# Patient Record
Sex: MALE | Race: ASIAN | NOT HISPANIC OR LATINO | Employment: UNEMPLOYED | URBAN - METROPOLITAN AREA
[De-identification: names, ages, dates, MRNs, and addresses within clinical notes are randomized per-mention and may not be internally consistent; named-entity substitution may affect disease eponyms.]

---

## 2017-03-28 DIAGNOSIS — E78.2 MIXED HYPERLIPIDEMIA: ICD-10-CM

## 2017-03-28 RX ORDER — ATORVASTATIN CALCIUM 40 MG/1
TABLET, FILM COATED ORAL
Qty: 30 TABLET | Refills: 2 | Status: SHIPPED | OUTPATIENT
Start: 2017-03-28 | End: 2017-06-26 | Stop reason: SDUPTHER

## 2017-04-27 DIAGNOSIS — E11.9 DIABETES MELLITUS TYPE 2 WITHOUT RETINOPATHY: ICD-10-CM

## 2017-04-27 RX ORDER — GLIMEPIRIDE 4 MG/1
TABLET ORAL
Qty: 30 TABLET | Refills: 0 | Status: SHIPPED | OUTPATIENT
Start: 2017-04-27 | End: 2017-05-30 | Stop reason: SDUPTHER

## 2017-05-18 ENCOUNTER — OFFICE VISIT (OUTPATIENT)
Dept: INTERNAL MEDICINE | Facility: CLINIC | Age: 57
End: 2017-05-18
Payer: MEDICAID

## 2017-05-18 VITALS
DIASTOLIC BLOOD PRESSURE: 67 MMHG | HEART RATE: 71 BPM | WEIGHT: 158.06 LBS | TEMPERATURE: 96 F | BODY MASS INDEX: 24.76 KG/M2 | SYSTOLIC BLOOD PRESSURE: 118 MMHG | OXYGEN SATURATION: 97 %

## 2017-05-18 DIAGNOSIS — I10 HYPERTENSION, ESSENTIAL: ICD-10-CM

## 2017-05-18 DIAGNOSIS — M77.8 RIGHT ELBOW TENDONITIS: ICD-10-CM

## 2017-05-18 DIAGNOSIS — E11.9 DIABETES MELLITUS TYPE 2 WITHOUT RETINOPATHY: ICD-10-CM

## 2017-05-18 DIAGNOSIS — L98.9 SKIN LESION OF RIGHT LEG: Primary | ICD-10-CM

## 2017-05-18 DIAGNOSIS — E78.2 MIXED HYPERLIPIDEMIA: ICD-10-CM

## 2017-05-18 PROCEDURE — 99999 PR PBB SHADOW E&M-EST. PATIENT-LVL IV: CPT | Mod: PBBFAC,,, | Performed by: FAMILY MEDICINE

## 2017-05-18 PROCEDURE — 99214 OFFICE O/P EST MOD 30 MIN: CPT | Mod: S$PBB,,, | Performed by: FAMILY MEDICINE

## 2017-05-18 PROCEDURE — 99214 OFFICE O/P EST MOD 30 MIN: CPT | Mod: PBBFAC,PO | Performed by: FAMILY MEDICINE

## 2017-05-18 NOTE — MR AVS SNAPSHOT
[facility]  [address]  [place] [address]  Phone: [phone]  Fax: [phone]                  Robert Sinha   2017 2:40 PM   Office Visit    Description:  Male : 1960   Provider:  Raisa Snow MD   Department:  [facility]           Reason for Visit     sore on left leg     rt arm tenderness           Diagnoses this Visit        Comments    Right elbow tendonitis    -  Primary     Diabetes mellitus type 2 without retinopathy         Mixed hyperlipidemia         Hypertension, essential         Skin lesion of right leg                To Do List           Future Appointments        Provider Department Dept Phone    6/15/2017 7:30 AM Raisa Snow MD [facility] [phone]    10/16/2017 1:30 PM Sergio Devlin MD [facility] [phone]      Goals (5 Years of Data)     None      Follow-Up and Disposition     Return in about 4 weeks (around 6/15/2017).      [facility]     [facility] -  Assistance  Unless otherwise directed by your provider, please contact [facility], our nurse care line that is available for  assistance.     Registered nurses in the [facility] provide: appointment scheduling, clinical advisement, health education, and other advisory services.  Call: [phone] (toll free)               Medications           Message regarding Medications     Verify the changes and/or additions to your medication regime listed below are the same as discussed with your clinician today.  If any of these changes or additions are incorrect, please notify your healthcare provider.             Verify that the below list of medications is an accurate representation of the medications you are currently taking.  If none reported, the list may be blank. If incorrect, please contact your healthcare provider. Carry this list with you in case of emergency.           Current  Medications     atorvastatin (LIPITOR) 40 MG tablet TAKE 1 TABLET(40 MG) BY MOUTH EVERY DAY    glimepiride (AMARYL) 4 MG tablet TAKE 1 TABLET(4 MG) BY MOUTH EVERY DAY    hydrocortisone (WESTCORT) 0.2 % cream Apply topically 2 (two) times daily.    lisinopril-hydrochlorothiazide (PRINZIDE,ZESTORETIC) 10-12.5 mg per tablet Take 1 tablet by mouth once daily.    meloxicam (MOBIC) 7.5 MG tablet Take 7.5 mg by mouth once daily.    metformin (GLUCOPHAGE-XR) 750 MG 24 hr tablet TAKE 2 TABLETS BY MOUTH EVERY DAY    nystatin-triamcinolone (MYCOLOG II) cream ZACHARY TO RASH BID    sodium,potassium,mag sulfates (SUPREP BOWEL PREP KIT) 17.5-3.13-1.6 gram SolR Take 1 Units by mouth as directed.    SUPREP BOWEL PREP KIT 17.5-3.13-1.6 gram SolR Use as directed           Clinical Reference Information           Your Vitals Were     BP Pulse Temp    118/67 (BP Location: Right arm, Patient Position: Sitting, BP Method: Automatic) 71 96.2 °F (35.7 °C) (Tympanic)    Weight SpO2 BMI    71.7 kg (158 lb 1.1 oz) 97% 24.76 kg/m2      Blood Pressure          Most Recent Value    BP  118/67      Allergies as of 5/18/2017     Nsaids (Non-steroidal Anti-inflammatory Drug)      Immunizations Administered on Date of Encounter - 5/18/2017     None      Orders Placed During Today's Visit      Normal Orders This Visit    Ambulatory referral to Dermatology     Ambulatory Referral to Physical/Occupational Therapy     Future Labs/Procedures Expected by Expires    CBC auto differential  5/18/2017 7/17/2018    Comprehensive metabolic panel  5/18/2017 7/17/2018    Hemoglobin A1c  5/18/2017 7/17/2018    Lipid panel  5/18/2017 7/17/2018    Microalbumin/creatinine urine ratio  5/18/2017 7/17/2018      [facility] Sign-Up     Activating your [facility] account is as easy as 1-2-3!     1) Visit [email or web address], select Sign Up Now, enter this activation code and your date of birth, then select Next.  [email or web address]  Expires: 7/2/2017  4:07 PM      2) Create a  username and password to use when you visit [facility] in the future and select a security question in case you lose your password and select Next.    3) Enter your e-mail address and click Sign Up!    Additional Information  If you have questions, please e-mail [email or web address] or call [phone] to talk to our [employer] staff. Remember, [facility] is NOT to be used for urgent needs. For medical emergencies, dial 911.         Instructions      Tennis Elbow  Muscles connect to bones by thick, fibrous cords (tendons). When the muscles are overused by repeated motion, the tendons may become inflamed and painful. This condition is called tendonitis.  Tennis elbow (lateral epicondylitis) is a form of tendonitis. It occurs when the forearm muscles are used again and again in a twisting motion. Pain from tennis elbow occurs mainly on the outside of the elbow. But the pain can spread into the forearm and wrist. Your elbow may also be swollen and tender to the touch.  The pain may get worse when you move your arm or do simple activities. Bending your wrist back, shaking hands, or turning a doorknob may cause pain. The pain often gets worse after several weeks or months. Sometimes you may feel pain when your arm is still.  Tennis players who use a backhand stroke with poor technique are more likely to get tennis elbow. But playing tennis is only one cause of tennis elbow. Other common activities that can cause it include:  · Hammering  · Painting  · Raking  Besides tennis players, people at risk include , gardeners, musicians, and dentists. Sometimes people get tennis elbow without doing anything that would cause the injury.  Treatment includes resting the arm and taking anti-inflammatory medicines. Special splints help ease symptoms. Symptoms should get better after 4 to 6 weeks of rest. You may need steroid injections if resting and using a splint dont help. After the pain is relieved, you should change  your activities so the symptoms dont return. You may need physical therapy. It may include stretching, range-of-motion, and strengthening exercises. These treatments help most cases. You may need surgery if your symptoms continue for 6 months.  Home care  Follow these guidelines when caring for yourself at home:  · Rest your elbow as needed. Protect it from movement that causes pain. You may be told to use a forearm splint at night to ease symptoms in the morning. Your health care provider may recommend a special wrap or splint to compress the muscles of the forearm. This can ease pain during daytime activities. As your symptoms get better, start to move your elbow more.  · Put an ice pack on the injured area. Do this for 20 minutes every 1 to 2 hours the first day for pain relief. You can make an ice pack by wrapping a plastic bag of ice cubes in a thin towel. Continue using the ice pack 3 to 4 times a day for the next 2 days. Then use the ice pack as needed to ease pain and swelling.  · You may use acetaminophen or ibuprofen to control pain, unless another pain medicine was prescribed. If you have chronic liver or kidney disease, talk with your health care provider before using these medicines. Also talk with your provider if youve had a stomach ulcer or GI bleeding.  · After your elbow heals, avoid the motion that caused your pain. Or learn to move in a way that causes less stress on the tendon. Using a forearm wrap may keep tennis elbow from happening again.  · A tennis elbow strap may ease pain and keep you from further injury when you start playing tennis again. You can also lower your risk for injury by warming up before you play and cooling down afterward. You should also use the right equipment. For instance, make sure your racquet has the right  and is the right size for you.  Follow-up care  Follow up with your health care provider, or as advised, if your symptoms dont get better after 1 week of  treatment.  When to seek medical advice  Call your health care provider right away if any of these occur:  · Redness over the painful area  · Pain or swelling at the elbow gets worse  · Any numbness or tingling in your arm, hands, or fingers  · Unexplained fever over 101ºF (37.8ºC)   Date Last Reviewed: 2/17/2015  © 1233-8245 [employer]. [address] [place] [address], [place] [address]. All rights reserved. This information is not intended as a substitute for professional medical care. Always follow your healthcare professional's instructions.             Smoking Cessation     If you would like to quit smoking:   You may be eligible for free services if you are a [place] resident and started smoking cigarettes before September 1, 1988.  Call the Smoking Cessation Trust (SCT) toll free at (881) 695-8147 or (438) 134-7930.   Call 1-800-QUIT-NOW if you do not meet the above criteria.   Contact us via email: [email or web address]   View our website for more information: [email or web address]        Language Assistance Services     ATTENTION: Language assistance services are available, free of charge. Please call [phone].      ATENCIÓN: Si habla español, tiene a weiner disposición servicios gratuitos de asistencia lingüística. Llame al [phone].     CHÚ Ý: N?u b?n nói Ti?ng Vi?t, có các d?ch v? h? tr? ngôn ng? mi?n phí dành cho b?n. G?i s? [phone].         [facility] complies with applicable Federal civil rights laws and does not discriminate on the basis of race, color, national origin, age, disability, or sex.
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
## 2017-05-18 NOTE — PATIENT INSTRUCTIONS
Tennis Elbow  Muscles connect to bones by thick, fibrous cords (tendons). When the muscles are overused by repeated motion, the tendons may become inflamed and painful. This condition is called tendonitis.  Tennis elbow (lateral epicondylitis) is a form of tendonitis. It occurs when the forearm muscles are used again and again in a twisting motion. Pain from tennis elbow occurs mainly on the outside of the elbow. But the pain can spread into the forearm and wrist. Your elbow may also be swollen and tender to the touch.  The pain may get worse when you move your arm or do simple activities. Bending your wrist back, shaking hands, or turning a doorknob may cause pain. The pain often gets worse after several weeks or months. Sometimes you may feel pain when your arm is still.  Tennis players who use a backhand stroke with poor technique are more likely to get tennis elbow. But playing tennis is only one cause of tennis elbow. Other common activities that can cause it include:  · Hammering  · Painting  · Raking  Besides tennis players, people at risk include , gardeners, musicians, and dentists. Sometimes people get tennis elbow without doing anything that would cause the injury.  Treatment includes resting the arm and taking anti-inflammatory medicines. Special splints help ease symptoms. Symptoms should get better after 4 to 6 weeks of rest. You may need steroid injections if resting and using a splint dont help. After the pain is relieved, you should change your activities so the symptoms dont return. You may need physical therapy. It may include stretching, range-of-motion, and strengthening exercises. These treatments help most cases. You may need surgery if your symptoms continue for 6 months.  Home care  Follow these guidelines when caring for yourself at home:  · Rest your elbow as needed. Protect it from movement that causes pain. You may be told to use a forearm splint at night to ease symptoms  in the morning. Your health care provider may recommend a special wrap or splint to compress the muscles of the forearm. This can ease pain during daytime activities. As your symptoms get better, start to move your elbow more.  · Put an ice pack on the injured area. Do this for 20 minutes every 1 to 2 hours the first day for pain relief. You can make an ice pack by wrapping a plastic bag of ice cubes in a thin towel. Continue using the ice pack 3 to 4 times a day for the next 2 days. Then use the ice pack as needed to ease pain and swelling.  · You may use acetaminophen or ibuprofen to control pain, unless another pain medicine was prescribed. If you have chronic liver or kidney disease, talk with your health care provider before using these medicines. Also talk with your provider if youve had a stomach ulcer or GI bleeding.  · After your elbow heals, avoid the motion that caused your pain. Or learn to move in a way that causes less stress on the tendon. Using a forearm wrap may keep tennis elbow from happening again.  · A tennis elbow strap may ease pain and keep you from further injury when you start playing tennis again. You can also lower your risk for injury by warming up before you play and cooling down afterward. You should also use the right equipment. For instance, make sure your racquet has the right  and is the right size for you.  Follow-up care  Follow up with your health care provider, or as advised, if your symptoms dont get better after 1 week of treatment.  When to seek medical advice  Call your health care provider right away if any of these occur:  · Redness over the painful area  · Pain or swelling at the elbow gets worse  · Any numbness or tingling in your arm, hands, or fingers  · Unexplained fever over 101ºF (37.8ºC)   Date Last Reviewed: 2/17/2015  © 7733-6590 [employer]. [address] [place] [address], [place] [address]. All rights reserved. This information is not intended as a  substitute for professional medical care. Always follow your healthcare professional's instructions.

## 2017-05-22 ENCOUNTER — TELEPHONE (OUTPATIENT)
Dept: INTERNAL MEDICINE | Facility: CLINIC | Age: 57
End: 2017-05-22

## 2017-05-22 DIAGNOSIS — M77.8 RIGHT ELBOW TENDONITIS: Primary | ICD-10-CM

## 2017-05-22 NOTE — TELEPHONE ENCOUNTER
Please inform patient Dorinda HOOVER cannot see him due to insurance coverage.  I am sending a referral to our [facility] and they will contact the patient.

## 2017-05-22 NOTE — TELEPHONE ENCOUNTER
He was seen on 5/18/17 by Dr Snow  ( Here with growth to right lower leg. It is itchy - he states there was initially a purple discoloration at last visit but it has gotten larger)  Per the note.  He needs an appointment ASAP.  Please contact the patient for an appointment Or let me know if he can't be seen soon so I can schedule him to see surgery department

## 2017-05-30 DIAGNOSIS — E11.9 DIABETES MELLITUS TYPE 2 WITHOUT RETINOPATHY: ICD-10-CM

## 2017-05-30 RX ORDER — GLIMEPIRIDE 4 MG/1
TABLET ORAL
Qty: 30 TABLET | Refills: 0 | Status: SHIPPED | OUTPATIENT
Start: 2017-05-30 | End: 2017-09-01 | Stop reason: SDUPTHER

## 2017-06-07 DIAGNOSIS — E11.9 DIABETES MELLITUS TYPE 2, CONTROLLED: ICD-10-CM

## 2017-06-08 RX ORDER — METFORMIN HYDROCHLORIDE 750 MG/1
TABLET, EXTENDED RELEASE ORAL
Qty: 60 TABLET | Refills: 0 | Status: SHIPPED | OUTPATIENT
Start: 2017-06-08 | End: 2017-07-06 | Stop reason: SDUPTHER

## 2017-06-15 ENCOUNTER — OFFICE VISIT (OUTPATIENT)
Dept: INTERNAL MEDICINE | Facility: CLINIC | Age: 57
End: 2017-06-15
Payer: MEDICAID

## 2017-06-15 VITALS
SYSTOLIC BLOOD PRESSURE: 113 MMHG | DIASTOLIC BLOOD PRESSURE: 68 MMHG | HEART RATE: 69 BPM | OXYGEN SATURATION: 97 % | WEIGHT: 159.5 LBS | TEMPERATURE: 97 F | BODY MASS INDEX: 24.98 KG/M2

## 2017-06-15 DIAGNOSIS — E78.2 MIXED HYPERLIPIDEMIA: ICD-10-CM

## 2017-06-15 DIAGNOSIS — I10 HYPERTENSION, ESSENTIAL: ICD-10-CM

## 2017-06-15 DIAGNOSIS — E11.9 DIABETES MELLITUS TYPE 2 WITHOUT RETINOPATHY: ICD-10-CM

## 2017-06-15 DIAGNOSIS — M77.8 RIGHT ELBOW TENDONITIS: Primary | ICD-10-CM

## 2017-06-15 DIAGNOSIS — L30.9 DERMATITIS: ICD-10-CM

## 2017-06-15 LAB
ALBUMIN SERPL BCP-MCNC: 3.8 G/DL
ALP SERPL-CCNC: 59 U/L
ALT SERPL W/O P-5'-P-CCNC: 27 U/L
ANION GAP SERPL CALC-SCNC: 9 MMOL/L
AST SERPL-CCNC: 30 U/L
BASOPHILS # BLD AUTO: 0.04 K/UL
BASOPHILS NFR BLD: 0.5 %
BILIRUB SERPL-MCNC: 0.5 MG/DL
BUN SERPL-MCNC: 17 MG/DL
CALCIUM SERPL-MCNC: 9.4 MG/DL
CHLORIDE SERPL-SCNC: 105 MMOL/L
CHOLEST/HDLC SERPL: 5.1 {RATIO}
CO2 SERPL-SCNC: 22 MMOL/L
CREAT SERPL-MCNC: 0.9 MG/DL
CREAT UR-MCNC: 97 MG/DL
DIFFERENTIAL METHOD: ABNORMAL
EOSINOPHIL # BLD AUTO: 0.8 K/UL
EOSINOPHIL NFR BLD: 8.8 %
ERYTHROCYTE [DISTWIDTH] IN BLOOD BY AUTOMATED COUNT: 12.9 %
EST. GFR  (AFRICAN AMERICAN): >60 ML/MIN/1.73 M^2
EST. GFR  (NON AFRICAN AMERICAN): >60 ML/MIN/1.73 M^2
GLUCOSE SERPL-MCNC: 170 MG/DL
HCT VFR BLD AUTO: 44.2 %
HDL/CHOLESTEROL RATIO: 19.6 %
HDLC SERPL-MCNC: 189 MG/DL
HDLC SERPL-MCNC: 37 MG/DL
HGB BLD-MCNC: 15 G/DL
LDLC SERPL CALC-MCNC: 131.4 MG/DL
LYMPHOCYTES # BLD AUTO: 2.3 K/UL
LYMPHOCYTES NFR BLD: 26.6 %
MCH RBC QN AUTO: 31.6 PG
MCHC RBC AUTO-ENTMCNC: 33.9 %
MCV RBC AUTO: 93 FL
MICROALBUMIN UR DL<=1MG/L-MCNC: 9 UG/ML
MICROALBUMIN/CREATININE RATIO: 9.3 UG/MG
MONOCYTES # BLD AUTO: 0.6 K/UL
MONOCYTES NFR BLD: 6.7 %
NEUTROPHILS # BLD AUTO: 4.9 K/UL
NEUTROPHILS NFR BLD: 57.1 %
NONHDLC SERPL-MCNC: 152 MG/DL
PLATELET # BLD AUTO: 232 K/UL
PMV BLD AUTO: 9.9 FL
POTASSIUM SERPL-SCNC: 4.5 MMOL/L
PROT SERPL-MCNC: 7.4 G/DL
RBC # BLD AUTO: 4.75 M/UL
SODIUM SERPL-SCNC: 136 MMOL/L
TRIGL SERPL-MCNC: 103 MG/DL
WBC # BLD AUTO: 8.65 K/UL

## 2017-06-15 PROCEDURE — 85025 COMPLETE CBC W/AUTO DIFF WBC: CPT

## 2017-06-15 PROCEDURE — 83036 HEMOGLOBIN GLYCOSYLATED A1C: CPT

## 2017-06-15 PROCEDURE — 99999 PR PBB SHADOW E&M-EST. PATIENT-LVL III: CPT | Mod: PBBFAC,,, | Performed by: FAMILY MEDICINE

## 2017-06-15 PROCEDURE — 3044F HG A1C LEVEL LT 7.0%: CPT | Mod: ,,, | Performed by: FAMILY MEDICINE

## 2017-06-15 PROCEDURE — 99213 OFFICE O/P EST LOW 20 MIN: CPT | Mod: PBBFAC,PO | Performed by: FAMILY MEDICINE

## 2017-06-15 PROCEDURE — 99213 OFFICE O/P EST LOW 20 MIN: CPT | Mod: S$PBB,,, | Performed by: FAMILY MEDICINE

## 2017-06-15 PROCEDURE — 80053 COMPREHEN METABOLIC PANEL: CPT

## 2017-06-15 PROCEDURE — 82570 ASSAY OF URINE CREATININE: CPT

## 2017-06-15 PROCEDURE — 80061 LIPID PANEL: CPT

## 2017-06-15 RX ORDER — FLUTICASONE PROPIONATE 0.5 MG/G
CREAM TOPICAL 2 TIMES DAILY
Qty: 15 G | Refills: 0 | Status: SHIPPED | OUTPATIENT
Start: 2017-06-15 | End: 2019-02-26

## 2017-06-15 NOTE — PROGRESS NOTES
Subjective:       Patient ID: Robert Sinha is a 57 y.o. male.    Chief Complaint: Follow-up (right leg)    He is here for recheck - we were to review labs but they have not been obtained. He is using an elbow sleeve with straps and finds it helpful - he has not seen PT.  He has derm appt for 6 weeks from now - his leg lesion is TTP he states. No change.  Thinks creams have helped in the past and would like something for the itching.  He was helped with the hydrocortisone 0.2% in the past.        Review of Systems   Constitutional: Negative for unexpected weight change.   Cardiovascular: Negative for chest pain and leg swelling.   Skin: Positive for color change.       Objective:      Physical Exam   Constitutional: He is oriented to person, place, and time. He appears well-developed and well-nourished.   HENT:   Head: Normocephalic and atraumatic.   Neurological: He is alert and oriented to person, place, and time.   Skin: Skin is warm and dry.   2.5 x 1.6 elevated gray crust to right medial lower leg   Psychiatric: He has a normal mood and affect. His behavior is normal.         Assessment/Plan:     1. Right elbow tendonitis     2. Dermatitis  fluticasone (CUTIVATE) 0.05 % cream    he will use topical cream 2 weeks max then stop. sees derm 7/27/17.   3. Hypertension, essential  CBC auto differential    Comprehensive metabolic panel   4. Diabetes mellitus type 2 without retinopathy  Hemoglobin A1c    Microalbumin/creatinine urine ratio   5. Mixed hyperlipidemia  Lipid panel     Labs release today.  Will evaluate and follow-up as indicated.  He states he is not ready for refill yet on anything.  He is advised to use the cream for no more than 2 weeks from today.  He needs to be off it for several weeks before seeing dermatology.    He will continue to use his elbow splint.  He was contacted by PT and was told to call back for appointment.  Will do so if needed.

## 2017-06-16 LAB
ESTIMATED AVG GLUCOSE: 143 MG/DL
HBA1C MFR BLD HPLC: 6.6 %

## 2017-06-18 DIAGNOSIS — E78.2 MIXED HYPERLIPIDEMIA: ICD-10-CM

## 2017-06-18 DIAGNOSIS — E11.9 DIABETES MELLITUS TYPE 2 WITHOUT RETINOPATHY: Primary | ICD-10-CM

## 2017-06-26 DIAGNOSIS — E78.2 MIXED HYPERLIPIDEMIA: ICD-10-CM

## 2017-06-26 DIAGNOSIS — I10 HYPERTENSION, ESSENTIAL: ICD-10-CM

## 2017-06-26 RX ORDER — ATORVASTATIN CALCIUM 40 MG/1
TABLET, FILM COATED ORAL
Qty: 30 TABLET | Refills: 5 | Status: SHIPPED | OUTPATIENT
Start: 2017-06-26 | End: 2018-03-12 | Stop reason: SDUPTHER

## 2017-06-26 RX ORDER — LISINOPRIL AND HYDROCHLOROTHIAZIDE 10; 12.5 MG/1; MG/1
TABLET ORAL
Qty: 30 TABLET | Refills: 5 | Status: SHIPPED | OUTPATIENT
Start: 2017-06-26 | End: 2017-12-26 | Stop reason: SDUPTHER

## 2017-07-06 DIAGNOSIS — E11.9 DIABETES MELLITUS TYPE 2 WITHOUT RETINOPATHY: ICD-10-CM

## 2017-07-06 DIAGNOSIS — E11.9 DIABETES MELLITUS TYPE 2, CONTROLLED: ICD-10-CM

## 2017-07-06 RX ORDER — METFORMIN HYDROCHLORIDE 750 MG/1
TABLET, EXTENDED RELEASE ORAL
Qty: 60 TABLET | Refills: 0 | Status: SHIPPED | OUTPATIENT
Start: 2017-07-06 | End: 2017-07-28 | Stop reason: SDUPTHER

## 2017-07-06 RX ORDER — GLIMEPIRIDE 4 MG/1
TABLET ORAL
Qty: 30 TABLET | Refills: 0 | Status: SHIPPED | OUTPATIENT
Start: 2017-07-06 | End: 2017-07-25 | Stop reason: SDUPTHER

## 2017-07-25 DIAGNOSIS — E11.9 DIABETES MELLITUS TYPE 2 WITHOUT RETINOPATHY: ICD-10-CM

## 2017-07-25 RX ORDER — GLIMEPIRIDE 4 MG/1
TABLET ORAL
Qty: 30 TABLET | Refills: 0 | Status: SHIPPED | OUTPATIENT
Start: 2017-07-25 | End: 2017-09-01 | Stop reason: SDUPTHER

## 2017-07-28 DIAGNOSIS — E11.9 DIABETES MELLITUS TYPE 2, CONTROLLED: ICD-10-CM

## 2017-07-28 RX ORDER — METFORMIN HYDROCHLORIDE 750 MG/1
TABLET, EXTENDED RELEASE ORAL
Qty: 60 TABLET | Refills: 0 | Status: SHIPPED | OUTPATIENT
Start: 2017-07-28 | End: 2017-09-01 | Stop reason: SDUPTHER

## 2017-09-01 ENCOUNTER — TELEPHONE (OUTPATIENT)
Dept: INTERNAL MEDICINE | Facility: CLINIC | Age: 57
End: 2017-09-01

## 2017-09-01 DIAGNOSIS — E11.9 DIABETES MELLITUS TYPE 2 WITHOUT RETINOPATHY: ICD-10-CM

## 2017-09-01 RX ORDER — METFORMIN HYDROCHLORIDE 750 MG/1
1500 TABLET, EXTENDED RELEASE ORAL DAILY
Qty: 60 TABLET | Refills: 3 | Status: SHIPPED | OUTPATIENT
Start: 2017-09-01 | End: 2017-09-01

## 2017-09-01 RX ORDER — GLIMEPIRIDE 4 MG/1
TABLET ORAL
Qty: 30 TABLET | Refills: 3 | Status: SHIPPED | OUTPATIENT
Start: 2017-09-01 | End: 2017-12-26

## 2017-09-01 RX ORDER — GLIMEPIRIDE 4 MG/1
TABLET ORAL
Qty: 30 TABLET | Refills: 3 | Status: SHIPPED | OUTPATIENT
Start: 2017-09-01 | End: 2017-09-01 | Stop reason: SDUPTHER

## 2017-09-01 RX ORDER — METFORMIN HYDROCHLORIDE 500 MG/1
1500 TABLET, EXTENDED RELEASE ORAL DAILY
Qty: 90 TABLET | Refills: 3 | Status: SHIPPED | OUTPATIENT
Start: 2017-09-01 | End: 2017-12-26 | Stop reason: DRUGHIGH

## 2017-09-01 NOTE — TELEPHONE ENCOUNTER
Called and left a message for the patient to call the office back     All of his medications are on the 4 dollar plan through [facility] expect the Atorvastatin

## 2017-09-01 NOTE — TELEPHONE ENCOUNTER
----- Message from Jcarlos Patterson sent at 9/1/2017  2:32 PM CDT -----  Pt is returning a call to nurse to f/u on diabetes, high blood pressure, and cholesterol medications. Pt states he has no insurance and is unable to get his medication for the price asking. Pt is completely out of hid medication        Please call pt back at [phone]    [facility] [employer] [address] [place] [address] CHRISTEN COLON [place] [id number] [facility] [address] [address]  Phone: [phone] Fax: [phone]

## 2017-09-01 NOTE — TELEPHONE ENCOUNTER
Called pt and informed him that his medications can be filled through [employer] four dollar Rx plan. Pt verbalized understanding of the information given. Pt states that he will be using the [facility] at [facility] here in [place], [place] and would like for his Rx to be refilled there.    Please Advise.

## 2017-09-01 NOTE — TELEPHONE ENCOUNTER
Please ask him to check with his pharmacist regarding which medication was the most expensive.  I assume his atorvastatin was expensive.  Glimepiride, metformin, lisinopril Hydrochlorthiazide are usually in expensive.  They may also be cheaper at another pharmacy.  We show that he is on Medicaid which usually covers medications.  Verify that he is no longer on this plan.    Sending refills for his metformin and glimepiride.  He has refills at his pharmacy for his lisinoprilHCTZ and the atorvastatin.  I will be happy to work with him on cost, but most of his meds are not too expensive - he may have to change pharmacies.

## 2017-09-01 NOTE — TELEPHONE ENCOUNTER
"Returned the patient phone call. He states that he is out of his medications and no longer has any insurance. He states that he would like a "cheaper brand of medication called in"he is needing refills on his glimeperide, metformin, lisinopril-hydrochlorothiazide and atorvastatin). Pt states that the last time he tried to get his Rx refilled he was to pay almost two hundred dollars.      Please Advise  "

## 2017-09-01 NOTE — TELEPHONE ENCOUNTER
Glimepiride 4 mg one daily and metfromin  3 dialy sent to [facility] [place].   Atorvastatin not available for $4 - it will cost $30 for #30.  Only statin available for $4 is lovastatin. Pt informed to have [facility] transfer his atorvastatin and BP rxs from the [facility] to [facility].

## 2017-09-01 NOTE — TELEPHONE ENCOUNTER
----- Message from Jcarlos Patterson sent at 8/31/2017  4:39 PM CDT -----  Pt is requesting a call from nurse to discuss his medications and insurance. Pt states he is out of all his medications.          Please call pt back at [phone]

## 2017-10-03 DIAGNOSIS — E11.9 DIABETES MELLITUS TYPE 2, CONTROLLED: ICD-10-CM

## 2017-10-03 RX ORDER — METFORMIN HYDROCHLORIDE 750 MG/1
TABLET, EXTENDED RELEASE ORAL
Qty: 60 TABLET | Refills: 0 | Status: SHIPPED | OUTPATIENT
Start: 2017-10-03 | End: 2017-11-01 | Stop reason: SDUPTHER

## 2017-10-25 ENCOUNTER — TELEPHONE (OUTPATIENT)
Dept: INTERNAL MEDICINE | Facility: CLINIC | Age: 57
End: 2017-10-25

## 2017-10-25 NOTE — TELEPHONE ENCOUNTER
----- Message from Micki Lau sent at 10/25/2017 11:08 AM CDT -----  Contact: Patient   EP, patient would like to come in for pain in both right and left arms, Please call him at [phone] or [phone].    Thanks  td

## 2017-11-01 DIAGNOSIS — E11.9 DIABETES MELLITUS TYPE 2, CONTROLLED: ICD-10-CM

## 2017-11-01 RX ORDER — METFORMIN HYDROCHLORIDE 750 MG/1
TABLET, EXTENDED RELEASE ORAL
Qty: 60 TABLET | Refills: 0 | Status: SHIPPED | OUTPATIENT
Start: 2017-11-01 | End: 2017-12-04 | Stop reason: SDUPTHER

## 2017-12-04 ENCOUNTER — CLINICAL SUPPORT (OUTPATIENT)
Dept: INTERNAL MEDICINE | Facility: CLINIC | Age: 57
End: 2017-12-04
Payer: MEDICAID

## 2017-12-04 DIAGNOSIS — E78.2 MIXED HYPERLIPIDEMIA: ICD-10-CM

## 2017-12-04 DIAGNOSIS — E11.9 DIABETES MELLITUS TYPE 2 WITHOUT RETINOPATHY: ICD-10-CM

## 2017-12-04 DIAGNOSIS — E11.9 DIABETES MELLITUS TYPE 2, CONTROLLED: ICD-10-CM

## 2017-12-04 LAB
ANION GAP SERPL CALC-SCNC: 9 MMOL/L
BUN SERPL-MCNC: 19 MG/DL
CALCIUM SERPL-MCNC: 9.7 MG/DL
CHLORIDE SERPL-SCNC: 99 MMOL/L
CHOLEST SERPL-MCNC: 210 MG/DL
CHOLEST/HDLC SERPL: 5.7 {RATIO}
CO2 SERPL-SCNC: 26 MMOL/L
CREAT SERPL-MCNC: 1 MG/DL
EST. GFR  (AFRICAN AMERICAN): >60 ML/MIN/1.73 M^2
EST. GFR  (NON AFRICAN AMERICAN): >60 ML/MIN/1.73 M^2
ESTIMATED AVG GLUCOSE: 203 MG/DL
GLUCOSE SERPL-MCNC: 240 MG/DL
HBA1C MFR BLD HPLC: 8.7 %
HDLC SERPL-MCNC: 37 MG/DL
HDLC SERPL: 17.6 %
LDLC SERPL CALC-MCNC: 143.2 MG/DL
NONHDLC SERPL-MCNC: 173 MG/DL
POTASSIUM SERPL-SCNC: 4.6 MMOL/L
SODIUM SERPL-SCNC: 134 MMOL/L
TRIGL SERPL-MCNC: 149 MG/DL

## 2017-12-04 PROCEDURE — 83036 HEMOGLOBIN GLYCOSYLATED A1C: CPT

## 2017-12-04 PROCEDURE — 80061 LIPID PANEL: CPT

## 2017-12-04 PROCEDURE — 36415 COLL VENOUS BLD VENIPUNCTURE: CPT | Mod: PBBFAC,PO

## 2017-12-04 PROCEDURE — 80048 BASIC METABOLIC PNL TOTAL CA: CPT

## 2017-12-04 RX ORDER — METFORMIN HYDROCHLORIDE 750 MG/1
TABLET, EXTENDED RELEASE ORAL
Qty: 60 TABLET | Refills: 0 | Status: SHIPPED | OUTPATIENT
Start: 2017-12-04 | End: 2017-12-26 | Stop reason: SDUPTHER

## 2017-12-26 ENCOUNTER — OFFICE VISIT (OUTPATIENT)
Dept: INTERNAL MEDICINE | Facility: CLINIC | Age: 57
End: 2017-12-26
Payer: MEDICAID

## 2017-12-26 VITALS
TEMPERATURE: 97 F | DIASTOLIC BLOOD PRESSURE: 77 MMHG | BODY MASS INDEX: 25.38 KG/M2 | OXYGEN SATURATION: 95 % | SYSTOLIC BLOOD PRESSURE: 132 MMHG | WEIGHT: 162.06 LBS | HEART RATE: 83 BPM

## 2017-12-26 DIAGNOSIS — E11.9 DIABETES MELLITUS TYPE 2 WITHOUT RETINOPATHY: Primary | ICD-10-CM

## 2017-12-26 DIAGNOSIS — I10 HYPERTENSION, ESSENTIAL: ICD-10-CM

## 2017-12-26 DIAGNOSIS — M20.011 ACQUIRED MALLET DEFORMITY OF RIGHT MIDDLE FINGER: ICD-10-CM

## 2017-12-26 DIAGNOSIS — F17.200 CURRENT EVERY DAY SMOKER: ICD-10-CM

## 2017-12-26 DIAGNOSIS — E78.2 MIXED HYPERLIPIDEMIA: ICD-10-CM

## 2017-12-26 DIAGNOSIS — L30.9 DERMATITIS: ICD-10-CM

## 2017-12-26 PROCEDURE — 99999 PR PBB SHADOW E&M-EST. PATIENT-LVL III: CPT | Mod: PBBFAC,,, | Performed by: FAMILY MEDICINE

## 2017-12-26 PROCEDURE — 99214 OFFICE O/P EST MOD 30 MIN: CPT | Mod: S$PBB,,, | Performed by: FAMILY MEDICINE

## 2017-12-26 PROCEDURE — 99213 OFFICE O/P EST LOW 20 MIN: CPT | Mod: PBBFAC,PO | Performed by: FAMILY MEDICINE

## 2017-12-26 RX ORDER — LISINOPRIL AND HYDROCHLOROTHIAZIDE 10; 12.5 MG/1; MG/1
1 TABLET ORAL DAILY
Qty: 90 TABLET | Refills: 3 | Status: SHIPPED | OUTPATIENT
Start: 2017-12-26 | End: 2018-10-26 | Stop reason: DRUGHIGH

## 2017-12-26 RX ORDER — GLIMEPIRIDE 4 MG/1
TABLET ORAL
Qty: 90 TABLET | Refills: 1 | Status: SHIPPED | OUTPATIENT
Start: 2017-12-26 | End: 2018-06-22 | Stop reason: DRUGHIGH

## 2017-12-26 RX ORDER — METFORMIN HYDROCHLORIDE 750 MG/1
1500 TABLET, EXTENDED RELEASE ORAL DAILY
Qty: 180 TABLET | Refills: 1 | Status: SHIPPED | OUTPATIENT
Start: 2017-12-26 | End: 2018-06-22 | Stop reason: SDUPTHER

## 2017-12-26 NOTE — PROGRESS NOTES
Subjective:       Patient ID: Robert Sinha is a 57 y.o. male.    Chief Complaint: follow up on diabetes and lab test results    He is here for recheck DM 2, lipids, HTN with recent labs. He sts he has been using garlic - no atorvastatin - for a couple weeks.    Lab Results       Component                Value               Date                       WBC                      8.65                06/15/2017                 HGB                      15.0                06/15/2017                 HCT                      44.2                06/15/2017                 PLT                      232                 06/15/2017                 CHOL                     210 (H)             12/04/2017                 TRIG                     149                 12/04/2017                 HDL                      37 (L)              12/04/2017                 LDLCALC                  143.2               12/04/2017                 ALT                      27                  06/15/2017                 AST                      30                  06/15/2017                 NA                       134 (L)             12/04/2017                 K                        4.6                 12/04/2017                 CL                       99                  12/04/2017                 CALCIUM                  9.7                 12/04/2017                 CREATININE               1.0                 12/04/2017                 BUN                      19                  12/04/2017                 CO2                      26                  12/04/2017                 PSA                      0.32                05/16/2014                 GLU                      240 (H)             12/04/2017                 ESTGFRAFRICA             >60.0               12/04/2017                 EGFRNONAA                >60.0               12/04/2017                 HGBA1C                   8.7 (H)             12/04/2017                 MICALBCREAT               9.3                 06/15/2017            Glimepiride - out of this and could not get it filled - error with pharmacy. Will refill. He is also on 750mg metformin BID. No other DM meds.  HTN controlled. Note A1C 8.7, up from 6.6. On metformin alone.    He is also c/o contd problem with finger - injured it catching a ball couple months ago and he cannot extend the tip. No pain.      Diabetes   He presents for his follow-up diabetic visit. He has type 2 diabetes mellitus. His disease course has been worsening. There are no hypoglycemic associated symptoms. Associated symptoms include polyuria. Pertinent negatives for diabetes include no foot paresthesias and no foot ulcerations. There are no hypoglycemic complications. Pertinent negatives for diabetic complications include no heart disease, nephropathy, peripheral neuropathy or retinopathy. He participates in exercise intermittently. Home blood sugar record trend: no BS checks. An ACE inhibitor/angiotensin II receptor blocker is being taken. He does not see a podiatrist.Eye exam is not current.     Review of Systems   Endocrine: Positive for polyuria.   Genitourinary: Positive for frequency (thinks nocturia reduced by taking garlic last 3 weeks).   Skin: Positive for rash (improved with steroid cream - using off and on).       Objective:      Physical Exam   Constitutional: He is oriented to person, place, and time. He appears well-developed and well-nourished.   HENT:   Head: Normocephalic and atraumatic.   Right Ear: External ear normal.   Left Ear: External ear normal.   Mouth/Throat: Oropharynx is clear and moist. No oropharyngeal exudate.   Neck: Normal range of motion. Neck supple.   Cardiovascular: Normal rate, regular rhythm and normal heart sounds.    Pulses:       Dorsalis pedis pulses are 2+ on the right side, and 2+ on the left side.        Posterior tibial pulses are 1+ on the right side, and 1+ on the left side.   Pulmonary/Chest: Effort  normal and breath sounds normal.   Abdominal: Soft. Bowel sounds are normal. He exhibits no distension. There is no tenderness.   Musculoskeletal:        Right foot: There is normal range of motion and no deformity.        Left foot: There is normal range of motion and no deformity.   Middle right finger with DIP in flexion - unable to extend.    Feet:   Right Foot:   Protective Sensation: 10 sites tested. 10 sites sensed.   Skin Integrity: Negative for ulcer or skin breakdown.   Left Foot:   Protective Sensation: 10 sites tested. 10 sites sensed.   Skin Integrity: Negative for ulcer or skin breakdown.   Neurological: He is alert and oriented to person, place, and time.   Skin: Skin is warm and dry.   2.5 x 3 cm patch to medial right lower leg   Psychiatric: He has a normal mood and affect. His behavior is normal.         Assessment/Plan:     1. Diabetes mellitus type 2 without retinopathy  metFORMIN (GLUCOPHAGE-XR) 750 MG 24 hr tablet    glimepiride (AMARYL) 4 MG tablet    Hemoglobin A1c   2. Mixed hyperlipidemia  Lipid panel   3. Hypertension, essential  lisinopril-hydrochlorothiazide (PRINZIDE,ZESTORETIC) 10-12.5 mg per tablet   4. Dermatitis     5. 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 Pt advised and reviewed with him online that there is no evidence of garlic lowering lipids.  Agreed that he will not be started on any new statin rx till he rechecks in another 2 months. Check A1C then as well back on his glimepiride. Still on just 4 mg daily.

## 2017-12-27 ENCOUNTER — APPOINTMENT (OUTPATIENT)
Dept: RADIOLOGY | Facility: HOSPITAL | Age: 57
End: 2017-12-27
Attending: FAMILY MEDICINE
Payer: MEDICAID

## 2017-12-27 ENCOUNTER — TELEPHONE (OUTPATIENT)
Dept: INTERNAL MEDICINE | Facility: CLINIC | Age: 57
End: 2017-12-27

## 2017-12-27 DIAGNOSIS — S62.639A AVULSION FRACTURE OF DISTAL PHALANX OF FINGER, CLOSED, INITIAL ENCOUNTER: Primary | ICD-10-CM

## 2017-12-27 DIAGNOSIS — M20.011 ACQUIRED MALLET DEFORMITY OF RIGHT MIDDLE FINGER: ICD-10-CM

## 2017-12-27 PROCEDURE — 73140 X-RAY EXAM OF FINGER(S): CPT | Mod: 26,RT,, | Performed by: RADIOLOGY

## 2017-12-27 PROCEDURE — 73140 X-RAY EXAM OF FINGER(S): CPT | Mod: TC,PO

## 2017-12-28 NOTE — TELEPHONE ENCOUNTER
See his radiology report and results.  He does have an avulsion fracture to this digit.  Because it is a couple months old, it may not be correctable.  But I would like him to see ortho. Please schedule

## 2017-12-29 NOTE — TELEPHONE ENCOUNTER
Spoke to the patient  // notified of the results/ message // patient verbalized understanding      Will check with his insurance to see who is in network with his insurance

## 2018-01-08 DIAGNOSIS — E11.9 DIABETES MELLITUS TYPE 2, CONTROLLED: ICD-10-CM

## 2018-01-08 RX ORDER — METFORMIN HYDROCHLORIDE 750 MG/1
TABLET, EXTENDED RELEASE ORAL
Qty: 60 TABLET | Refills: 0 | OUTPATIENT
Start: 2018-01-08

## 2018-03-08 DIAGNOSIS — E78.2 MIXED HYPERLIPIDEMIA: ICD-10-CM

## 2018-03-09 RX ORDER — ATORVASTATIN CALCIUM 40 MG/1
TABLET, FILM COATED ORAL
Qty: 30 TABLET | Refills: 0 | OUTPATIENT
Start: 2018-03-09

## 2018-03-10 NOTE — TELEPHONE ENCOUNTER
Patient had lab orders scheduled for 2/26/18.  He did not get them.  A refill request was received for atorvastatin.  The agreement at his last visit was to get his lab results first before starting back on that, and I have refused the refill request    Please schedule him for his labs followed by a visit and we can review his current cholesterol through labs and visit.

## 2018-03-12 DIAGNOSIS — E78.2 MIXED HYPERLIPIDEMIA: ICD-10-CM

## 2018-03-12 RX ORDER — ATORVASTATIN CALCIUM 40 MG/1
TABLET, FILM COATED ORAL
Qty: 30 TABLET | Refills: 5 | Status: SHIPPED | OUTPATIENT
Start: 2018-03-12 | End: 2018-10-26 | Stop reason: ALTCHOICE

## 2018-03-12 NOTE — TELEPHONE ENCOUNTER
Pt stopped by the office on this afternoon to put a refill request in for his cholesterol medication. Pt Rx refill placed and sent to Dr. Snow.

## 2018-03-12 NOTE — TELEPHONE ENCOUNTER
Tried contacting the pt to inform of him needing to schedule an appointment. Unable to reach pt or leave a message.

## 2018-03-13 NOTE — TELEPHONE ENCOUNTER
He was supposed to get his lipids checked before starting back on statin - see 3/9/18 refill notes.  I have sent the atorvastatin refill to his pharmacy - but pls try one more time to have him come get his lab done before he starts med - thank you.

## 2018-06-06 ENCOUNTER — TELEPHONE (OUTPATIENT)
Dept: INTERNAL MEDICINE | Facility: CLINIC | Age: 58
End: 2018-06-06

## 2018-06-06 DIAGNOSIS — I10 HYPERTENSION, ESSENTIAL: ICD-10-CM

## 2018-06-06 DIAGNOSIS — E78.5 HYPERLIPIDEMIA ASSOCIATED WITH TYPE 2 DIABETES MELLITUS: ICD-10-CM

## 2018-06-06 DIAGNOSIS — E11.69 HYPERLIPIDEMIA ASSOCIATED WITH TYPE 2 DIABETES MELLITUS: ICD-10-CM

## 2018-06-06 DIAGNOSIS — E11.9 DIABETES MELLITUS TYPE 2 WITHOUT RETINOPATHY: Primary | ICD-10-CM

## 2018-06-06 NOTE — TELEPHONE ENCOUNTER
----- Message from Radha Duran sent at 6/6/2018  3:32 PM CDT -----  Contact: self/[phone]  Would like to consult with nurse regarding lab orders, please call back at [phone]. Thanks/ar

## 2018-06-06 NOTE — TELEPHONE ENCOUNTER
The patient has some labs orders in the system that he is coming to get Monday.  Please advise if you would like to add anything

## 2018-06-11 ENCOUNTER — CLINICAL SUPPORT (OUTPATIENT)
Dept: INTERNAL MEDICINE | Facility: CLINIC | Age: 58
End: 2018-06-11
Payer: MEDICAID

## 2018-06-11 DIAGNOSIS — E11.9 DIABETES MELLITUS TYPE 2 WITHOUT RETINOPATHY: ICD-10-CM

## 2018-06-11 DIAGNOSIS — I10 HYPERTENSION, ESSENTIAL: ICD-10-CM

## 2018-06-11 DIAGNOSIS — E78.2 MIXED HYPERLIPIDEMIA: ICD-10-CM

## 2018-06-11 LAB
ALBUMIN SERPL BCP-MCNC: 3.9 G/DL
ALP SERPL-CCNC: 47 U/L
ALT SERPL W/O P-5'-P-CCNC: 18 U/L
ANION GAP SERPL CALC-SCNC: 10 MMOL/L
AST SERPL-CCNC: 22 U/L
BASOPHILS # BLD AUTO: 0.04 K/UL
BASOPHILS NFR BLD: 0.5 %
BILIRUB SERPL-MCNC: 0.6 MG/DL
BUN SERPL-MCNC: 16 MG/DL
CALCIUM SERPL-MCNC: 9.5 MG/DL
CHLORIDE SERPL-SCNC: 104 MMOL/L
CHOLEST SERPL-MCNC: 258 MG/DL
CHOLEST/HDLC SERPL: 8.6 {RATIO}
CO2 SERPL-SCNC: 22 MMOL/L
CREAT SERPL-MCNC: 0.9 MG/DL
CREAT UR-MCNC: 173 MG/DL
DIFFERENTIAL METHOD: ABNORMAL
EOSINOPHIL # BLD AUTO: 0.4 K/UL
EOSINOPHIL NFR BLD: 4.6 %
ERYTHROCYTE [DISTWIDTH] IN BLOOD BY AUTOMATED COUNT: 12.2 %
EST. GFR  (AFRICAN AMERICAN): >60 ML/MIN/1.73 M^2
EST. GFR  (NON AFRICAN AMERICAN): >60 ML/MIN/1.73 M^2
ESTIMATED AVG GLUCOSE: 137 MG/DL
GLUCOSE SERPL-MCNC: 151 MG/DL
HBA1C MFR BLD HPLC: 6.4 %
HCT VFR BLD AUTO: 46.5 %
HDLC SERPL-MCNC: 30 MG/DL
HDLC SERPL: 11.6 %
HGB BLD-MCNC: 15.8 G/DL
IMM GRANULOCYTES # BLD AUTO: 0.04 K/UL
IMM GRANULOCYTES NFR BLD AUTO: 0.5 %
LDLC SERPL CALC-MCNC: 186.2 MG/DL
LYMPHOCYTES # BLD AUTO: 3 K/UL
LYMPHOCYTES NFR BLD: 40.4 %
MCH RBC QN AUTO: 31.7 PG
MCHC RBC AUTO-ENTMCNC: 34 G/DL
MCV RBC AUTO: 93 FL
MICROALBUMIN UR DL<=1MG/L-MCNC: 52 UG/ML
MICROALBUMIN/CREATININE RATIO: 30.1 UG/MG
MONOCYTES # BLD AUTO: 0.7 K/UL
MONOCYTES NFR BLD: 9.2 %
NEUTROPHILS # BLD AUTO: 3.4 K/UL
NEUTROPHILS NFR BLD: 44.8 %
NONHDLC SERPL-MCNC: 228 MG/DL
NRBC BLD-RTO: 0 /100 WBC
PLATELET # BLD AUTO: 253 K/UL
PMV BLD AUTO: 9.1 FL
POTASSIUM SERPL-SCNC: 4 MMOL/L
PROT SERPL-MCNC: 7.2 G/DL
RBC # BLD AUTO: 4.98 M/UL
SODIUM SERPL-SCNC: 136 MMOL/L
TRIGL SERPL-MCNC: 209 MG/DL
WBC # BLD AUTO: 7.53 K/UL

## 2018-06-11 PROCEDURE — 80061 LIPID PANEL: CPT

## 2018-06-11 PROCEDURE — 85025 COMPLETE CBC W/AUTO DIFF WBC: CPT

## 2018-06-11 PROCEDURE — 36415 COLL VENOUS BLD VENIPUNCTURE: CPT | Mod: PBBFAC,PO

## 2018-06-11 PROCEDURE — 80053 COMPREHEN METABOLIC PANEL: CPT

## 2018-06-11 PROCEDURE — 82043 UR ALBUMIN QUANTITATIVE: CPT

## 2018-06-11 PROCEDURE — 99999 PR PBB SHADOW E&M-EST. PATIENT-LVL I: CPT | Mod: PBBFAC,,,

## 2018-06-11 PROCEDURE — 99211 OFF/OP EST MAY X REQ PHY/QHP: CPT | Mod: PBBFAC,PO

## 2018-06-11 PROCEDURE — 83036 HEMOGLOBIN GLYCOSYLATED A1C: CPT

## 2018-06-22 ENCOUNTER — TELEPHONE (OUTPATIENT)
Dept: ORTHOPEDICS | Facility: CLINIC | Age: 58
End: 2018-06-22

## 2018-06-22 ENCOUNTER — OFFICE VISIT (OUTPATIENT)
Dept: INTERNAL MEDICINE | Facility: CLINIC | Age: 58
End: 2018-06-22
Payer: MEDICAID

## 2018-06-22 VITALS
SYSTOLIC BLOOD PRESSURE: 136 MMHG | HEART RATE: 65 BPM | BODY MASS INDEX: 25.1 KG/M2 | WEIGHT: 159.94 LBS | HEIGHT: 67 IN | DIASTOLIC BLOOD PRESSURE: 69 MMHG | OXYGEN SATURATION: 97 % | TEMPERATURE: 97 F

## 2018-06-22 DIAGNOSIS — E11.69 HYPERLIPIDEMIA ASSOCIATED WITH TYPE 2 DIABETES MELLITUS: ICD-10-CM

## 2018-06-22 DIAGNOSIS — S62.639K: ICD-10-CM

## 2018-06-22 DIAGNOSIS — E78.5 HYPERLIPIDEMIA ASSOCIATED WITH TYPE 2 DIABETES MELLITUS: ICD-10-CM

## 2018-06-22 DIAGNOSIS — E11.9 DIABETES MELLITUS TYPE 2 WITHOUT RETINOPATHY: Primary | ICD-10-CM

## 2018-06-22 DIAGNOSIS — I10 HYPERTENSION, ESSENTIAL: ICD-10-CM

## 2018-06-22 PROCEDURE — 99214 OFFICE O/P EST MOD 30 MIN: CPT | Mod: PBBFAC,PO | Performed by: FAMILY MEDICINE

## 2018-06-22 PROCEDURE — 99214 OFFICE O/P EST MOD 30 MIN: CPT | Mod: S$PBB,,, | Performed by: FAMILY MEDICINE

## 2018-06-22 PROCEDURE — 99999 PR PBB SHADOW E&M-EST. PATIENT-LVL IV: CPT | Mod: PBBFAC,,, | Performed by: FAMILY MEDICINE

## 2018-06-22 RX ORDER — METFORMIN HYDROCHLORIDE 750 MG/1
750 TABLET, EXTENDED RELEASE ORAL 2 TIMES DAILY
Qty: 180 TABLET | Refills: 1 | Status: SHIPPED | OUTPATIENT
Start: 2018-06-22 | End: 2018-12-07 | Stop reason: SDUPTHER

## 2018-06-22 RX ORDER — LANCING DEVICE
EACH MISCELLANEOUS
Qty: 1 EACH | Refills: 0 | Status: SHIPPED | OUTPATIENT
Start: 2018-06-22

## 2018-06-22 RX ORDER — INSULIN PUMP SYRINGE, 3 ML
EACH MISCELLANEOUS
Qty: 1 EACH | Refills: 0 | Status: SHIPPED | OUTPATIENT
Start: 2018-06-22 | End: 2021-07-22

## 2018-06-22 RX ORDER — LANCETS
1 EACH MISCELLANEOUS DAILY
Qty: 100 EACH | Refills: 4 | Status: SHIPPED | OUTPATIENT
Start: 2018-06-22

## 2018-06-22 RX ORDER — GLIMEPIRIDE 2 MG/1
2 TABLET ORAL
Qty: 90 TABLET | Refills: 1 | Status: SHIPPED | OUTPATIENT
Start: 2018-06-22 | End: 2018-12-03 | Stop reason: SDUPTHER

## 2018-06-22 NOTE — PROGRESS NOTES
Subjective:       Patient ID: Robert Sinha is a 58 y.o. male.    Chief Complaint: discuss labs; Diabetes; and Hypertension    Here for 6 month recheck on type 2 diabetes, hyperlipidemia, hypertension.  He has recent labs which are reviewed.  Lab Results       Component                Value               Date                       WBC                      7.53                06/11/2018                 HGB                      15.8                06/11/2018                 HCT                      46.5                06/11/2018                 PLT                      253                 06/11/2018                 CHOL                     258 (H)             06/11/2018                 TRIG                     209 (H)             06/11/2018                 HDL                      30 (L)              06/11/2018                 LDLCALC                  186.2 (H)           06/11/2018                 ALT                      18                  06/11/2018                 AST                      22                  06/11/2018                 NA                       136                 06/11/2018                 K                        4.0                 06/11/2018                 CL                       104                 06/11/2018                 CALCIUM                  9.5                 06/11/2018                 CREATININE               0.9                 06/11/2018                 BUN                      16                  06/11/2018                 CO2                      22 (L)              06/11/2018                 PSA                      0.32                05/16/2014                 GLU                      151 (H)             06/11/2018                 ESTGFRAFRICA             >60.0               06/11/2018                 EGFRNONAA                >60.0               06/11/2018                 HGBA1C                   6.4 (H)             06/11/2018                 MICALBCREAT              30.1  (H)            06/11/2018            He states he has been on no lipitor for 4-5 months and taking metformin 750mg only one daily.  He has been using several supplements including roasted dandelion root tea one daily.  Excellent improvement on his A1c down from 8.7 to 6.4.  He sees that the supplements have not been effective for controlling his cholesterol.  At his last visit he was using garlic and the results were better at that time.  His LDL-C went from 143 to 186 since that check.  HTN controlled, no med issues.    He has a mallet deformity to his R middle finger - dxd on xray at last visit with avulsion fx and referred to ortho.  He sts he was unable to get ortho appt - his insurance stated no orthopedists were available. Still with pain on trying to  heavier items to middle finger. Fracture occurred about 3 months prior to his last visit - so approx 9 mo ago.      Review of Systems   Gastrointestinal: Negative for diarrhea.   Musculoskeletal: Positive for arthralgias (tip of middle right digit - deformity for 9 monhts - painful with lifting items).       Objective:      Physical Exam   Constitutional: He is oriented to person, place, and time. He appears well-developed.   HENT:   Head: Normocephalic and atraumatic.   Cardiovascular: Normal rate, regular rhythm and normal heart sounds.    Pulmonary/Chest: Effort normal and breath sounds normal.   Musculoskeletal:   Right middle finger with flexion deformity to DIP   Neurological: He is alert and oriented to person, place, and time.   Skin: Skin is warm and dry.   Psychiatric: He has a normal mood and affect. His behavior is normal.         Assessment/Plan:     1. Diabetes mellitus type 2 without retinopathy  blood-glucose meter (BLOOD GLUCOSE MONITORING) kit    blood sugar diagnostic Strp    metFORMIN (GLUCOPHAGE-XR) 750 MG 24 hr tablet    Hemoglobin A1c    lancets Misc    lancing device Misc    glimepiride (AMARYL) 2 MG tablet   2. 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 Hyperlipidemia associated with type 2 diabetes mellitus  Lipid panel   4. Hypertension, essential  Comprehensive metabolic panel    will refer again to ortho.  He will get back on Lipitor 40.  He has refills available since he has not been on this medication over the last 6 months.  A1c shows excellent control on 4 mg glimepiride and metformin 750 mg daily.  He is advised to return to two metformin daily and instead to reduce his glimepiride from 4 mg to 2 mg daily.  No change to hypertension medication.  Recheck4 months.

## 2018-06-22 NOTE — PATIENT INSTRUCTIONS
Continue to take one metformin and the blood pressure pill in the morning.  Please restart atorvastatin 40mg (Lipitor). Take it in the evening with the second metformin.    Start taking 2mg glimepiride once daily between 5 to 7 PM. (Ideally take it up to 30 minutes before your meal.)

## 2018-06-22 NOTE — TELEPHONE ENCOUNTER
CALLED PT TO SCHEDULE APPOINTMENT RECEIVED IN WORKQUEUE. PT WAS UNAVAILABLE. LEFT MESSAGE FOR PT TO CALL BACK AT EARLIEST CONVENIENCE.

## 2018-06-27 ENCOUNTER — TELEPHONE (OUTPATIENT)
Dept: ORTHOPEDICS | Facility: CLINIC | Age: 58
End: 2018-06-27

## 2018-06-27 NOTE — TELEPHONE ENCOUNTER
CALLED PT TO SCHEDULE FROM REFERRAL IN WORKQUEUE. PT WAS UNAVAILABLE. UNABLE TO LEAVE MESSAGE. THIS IS THIRD ATTEMPT. PT REFERRAL REMOVED.

## 2018-10-22 ENCOUNTER — CLINICAL SUPPORT (OUTPATIENT)
Dept: INTERNAL MEDICINE | Facility: CLINIC | Age: 58
End: 2018-10-22
Payer: MEDICAID

## 2018-10-22 DIAGNOSIS — E11.9 DIABETES MELLITUS TYPE 2 WITHOUT RETINOPATHY: ICD-10-CM

## 2018-10-22 DIAGNOSIS — E78.5 HYPERLIPIDEMIA ASSOCIATED WITH TYPE 2 DIABETES MELLITUS: ICD-10-CM

## 2018-10-22 DIAGNOSIS — E11.69 HYPERLIPIDEMIA ASSOCIATED WITH TYPE 2 DIABETES MELLITUS: ICD-10-CM

## 2018-10-22 DIAGNOSIS — I10 HYPERTENSION, ESSENTIAL: ICD-10-CM

## 2018-10-22 LAB
ALBUMIN SERPL BCP-MCNC: 4 G/DL
ALP SERPL-CCNC: 56 U/L
ALT SERPL W/O P-5'-P-CCNC: 23 U/L
ANION GAP SERPL CALC-SCNC: 8 MMOL/L
AST SERPL-CCNC: 22 U/L
BILIRUB SERPL-MCNC: 0.6 MG/DL
BUN SERPL-MCNC: 18 MG/DL
CALCIUM SERPL-MCNC: 10 MG/DL
CHLORIDE SERPL-SCNC: 102 MMOL/L
CHOLEST SERPL-MCNC: 189 MG/DL
CHOLEST/HDLC SERPL: 4.7 {RATIO}
CO2 SERPL-SCNC: 24 MMOL/L
CREAT SERPL-MCNC: 0.9 MG/DL
EST. GFR  (AFRICAN AMERICAN): >60 ML/MIN/1.73 M^2
EST. GFR  (NON AFRICAN AMERICAN): >60 ML/MIN/1.73 M^2
ESTIMATED AVG GLUCOSE: 137 MG/DL
GLUCOSE SERPL-MCNC: 176 MG/DL
HBA1C MFR BLD HPLC: 6.4 %
HDLC SERPL-MCNC: 40 MG/DL
HDLC SERPL: 21.2 %
LDLC SERPL CALC-MCNC: 125 MG/DL
NONHDLC SERPL-MCNC: 149 MG/DL
POTASSIUM SERPL-SCNC: 4.2 MMOL/L
PROT SERPL-MCNC: 7.6 G/DL
SODIUM SERPL-SCNC: 134 MMOL/L
TRIGL SERPL-MCNC: 120 MG/DL

## 2018-10-22 PROCEDURE — 83036 HEMOGLOBIN GLYCOSYLATED A1C: CPT

## 2018-10-22 PROCEDURE — 99999 PR PBB SHADOW E&M-EST. PATIENT-LVL I: CPT | Mod: PBBFAC,,,

## 2018-10-22 PROCEDURE — 99211 OFF/OP EST MAY X REQ PHY/QHP: CPT | Mod: PBBFAC,PO

## 2018-10-22 PROCEDURE — 80053 COMPREHEN METABOLIC PANEL: CPT

## 2018-10-22 PROCEDURE — 80061 LIPID PANEL: CPT

## 2018-10-22 RX ORDER — LISINOPRIL AND HYDROCHLOROTHIAZIDE 10; 12.5 MG/1; MG/1
TABLET ORAL
Qty: 90 TABLET | Refills: 0 | OUTPATIENT
Start: 2018-10-22

## 2018-10-26 ENCOUNTER — OFFICE VISIT (OUTPATIENT)
Dept: INTERNAL MEDICINE | Facility: CLINIC | Age: 58
End: 2018-10-26
Payer: MEDICAID

## 2018-10-26 VITALS
HEART RATE: 71 BPM | HEIGHT: 67 IN | OXYGEN SATURATION: 96 % | TEMPERATURE: 97 F | WEIGHT: 161.94 LBS | DIASTOLIC BLOOD PRESSURE: 75 MMHG | BODY MASS INDEX: 25.42 KG/M2 | SYSTOLIC BLOOD PRESSURE: 137 MMHG

## 2018-10-26 DIAGNOSIS — E11.9 DIABETES MELLITUS TYPE 2 WITHOUT RETINOPATHY: Primary | ICD-10-CM

## 2018-10-26 DIAGNOSIS — Z63.9 FAMILY DISTRESS: ICD-10-CM

## 2018-10-26 DIAGNOSIS — F17.200 CURRENT SMOKER: ICD-10-CM

## 2018-10-26 DIAGNOSIS — K21.9 GASTROESOPHAGEAL REFLUX DISEASE WITHOUT ESOPHAGITIS: ICD-10-CM

## 2018-10-26 DIAGNOSIS — E11.69 ONYCHOMYCOSIS OF MULTIPLE TOENAILS WITH TYPE 2 DIABETES MELLITUS: ICD-10-CM

## 2018-10-26 DIAGNOSIS — R80.9 CONTROLLED TYPE 2 DIABETES MELLITUS WITH MICROALBUMINURIA, WITHOUT LONG-TERM CURRENT USE OF INSULIN: ICD-10-CM

## 2018-10-26 DIAGNOSIS — B35.1 ONYCHOMYCOSIS OF MULTIPLE TOENAILS WITH TYPE 2 DIABETES MELLITUS: ICD-10-CM

## 2018-10-26 DIAGNOSIS — E11.29 CONTROLLED TYPE 2 DIABETES MELLITUS WITH MICROALBUMINURIA, WITHOUT LONG-TERM CURRENT USE OF INSULIN: ICD-10-CM

## 2018-10-26 DIAGNOSIS — E78.2 MIXED HYPERLIPIDEMIA: ICD-10-CM

## 2018-10-26 DIAGNOSIS — I10 HYPERTENSION, ESSENTIAL: ICD-10-CM

## 2018-10-26 DIAGNOSIS — Z12.9 SCREENING FOR CANCER: ICD-10-CM

## 2018-10-26 PROCEDURE — 99215 OFFICE O/P EST HI 40 MIN: CPT | Mod: PBBFAC,PO | Performed by: FAMILY MEDICINE

## 2018-10-26 PROCEDURE — 99999 PR PBB SHADOW E&M-EST. PATIENT-LVL V: CPT | Mod: PBBFAC,,, | Performed by: FAMILY MEDICINE

## 2018-10-26 PROCEDURE — 99214 OFFICE O/P EST MOD 30 MIN: CPT | Mod: S$PBB,,, | Performed by: FAMILY MEDICINE

## 2018-10-26 RX ORDER — ROSUVASTATIN CALCIUM 40 MG/1
40 TABLET, COATED ORAL NIGHTLY
Qty: 90 TABLET | Refills: 3 | Status: SHIPPED | OUTPATIENT
Start: 2018-10-26 | End: 2018-12-20 | Stop reason: SDUPTHER

## 2018-10-26 RX ORDER — LISINOPRIL AND HYDROCHLOROTHIAZIDE 10; 12.5 MG/1; MG/1
1 TABLET ORAL DAILY
Qty: 90 TABLET | Refills: 3 | Status: CANCELLED | OUTPATIENT
Start: 2018-10-26

## 2018-10-26 RX ORDER — LISINOPRIL AND HYDROCHLOROTHIAZIDE 12.5; 2 MG/1; MG/1
1 TABLET ORAL DAILY
Qty: 90 TABLET | Refills: 3 | Status: SHIPPED | OUTPATIENT
Start: 2018-10-26 | End: 2019-11-07

## 2018-10-26 SDOH — SOCIAL DETERMINANTS OF HEALTH (SDOH): PROBLEM RELATED TO PRIMARY SUPPORT GROUP, UNSPECIFIED: Z63.9

## 2018-10-26 NOTE — PROGRESS NOTES
Subjective:       Patient ID: Robert Sinha is a 58 y.o. male.    Chief Complaint: Follow-up (discuss labs)    Here for scheduled DM, HTN recheck with recent labs.  Lab Results       Component                Value               Date                       WBC                      7.53                06/11/2018                 HGB                      15.8                06/11/2018                 HCT                      46.5                06/11/2018                 PLT                      253                 06/11/2018                 CHOL                     189                 10/22/2018                 TRIG                     120                 10/22/2018                 HDL                      40                  10/22/2018                 LDLCALC                  125.0               10/22/2018                 ALT                      23                  10/22/2018                 AST                      22                  10/22/2018                 NA                       134 (L)             10/22/2018                 K                        4.2                 10/22/2018                 CL                       102                 10/22/2018                 CALCIUM                  10.0                10/22/2018                 CREATININE               0.9                 10/22/2018                 BUN                      18                  10/22/2018                 CO2                      24                  10/22/2018                 PSA                      0.32                05/16/2014                 GLU                      176 (H)             10/22/2018                 ESTGFRAFRICA             >60.0               10/22/2018                 EGFRNONAA                >60.0               10/22/2018                 HGBA1C                   6.4 (H)             10/22/2018                 MICALBCREAT              30.1 (H)            06/11/2018            Microalbuminuria present. DM2  controlled.  Family stressors present, this is discussed, option offered.       Diabetes   He presents for his follow-up diabetic visit. He has type 2 diabetes mellitus. There are no hypoglycemic associated symptoms. Pertinent negatives for diabetes include no chest pain, no foot paresthesias and no visual change. There are no hypoglycemic complications. Symptoms are stable. There are no diabetic complications. Risk factors for coronary artery disease include dyslipidemia, hypertension, male sex and diabetes mellitus. Current diabetic treatment includes oral agent (dual therapy). His weight is stable. He is following a generally healthy diet. His breakfast blood glucose range is generally 130-140 mg/dl. An ACE inhibitor/angiotensin II receptor blocker is being taken. He does not see a podiatrist.Eye exam is not current.     Review of Systems   Respiratory: Negative for chest tightness and shortness of breath.    Cardiovascular: Negative for chest pain, palpitations and leg swelling.   Neurological: Negative for numbness.       Objective:      Physical Exam   Constitutional: He is oriented to person, place, and time. He appears well-developed and well-nourished.   HENT:   Head: Normocephalic and atraumatic.   Right Ear: External ear normal.   Left Ear: External ear normal.   Mouth/Throat: Oropharynx is clear and moist. No oropharyngeal exudate.   Neck: Normal range of motion. Neck supple.   Cardiovascular: Normal rate, regular rhythm and normal heart sounds.   Pulses:       Dorsalis pedis pulses are 2+ on the right side, and 2+ on the left side.        Posterior tibial pulses are 1+ on the right side, and 1+ on the left side.   Pulmonary/Chest: Effort normal and breath sounds normal.   Abdominal: Soft. Bowel sounds are normal. He exhibits no distension. There is no tenderness.   Musculoskeletal: He exhibits no edema.        Right foot: There is normal range of motion and no deformity.        Left foot: There is  normal range of motion and no deformity.   Feet:   Right Foot:   Protective Sensation: 10 sites tested. 10 sites sensed.   Skin Integrity: Negative for ulcer or skin breakdown.   Left Foot:   Protective Sensation: 10 sites tested. 10 sites sensed.   Skin Integrity: Negative for ulcer or skin breakdown.   Neurological: He is alert and oriented to person, place, and time.   Skin: Skin is warm and dry.   Thickened nails multiple toes B   Psychiatric: He has a normal mood and affect. His behavior is normal.         Assessment/Plan:     1. Diabetes mellitus type 2 without retinopathy  Ambulatory referral to Ophthalmology    Hemoglobin A1c   2. Hypertension, essential  Comprehensive metabolic panel    lisinopril-hydrochlorothiazide (PRINZIDE,ZESTORETIC) 20-12.5 mg per tablet   3. Mixed hyperlipidemia  rosuvastatin (CRESTOR) 40 MG Tab    Lipid panel   4. Gastroesophageal reflux disease without esophagitis  famotidine-calcium carbonate-magnesium hydroxide (PEPCID COMPLETE) chewable tablet   5. Screening for cancer  CT Chest Lung Screening Low Dose   6. Current smoker  Ambulatory referral to Smoking Cessation Program   7. Onychomycosis of multiple toenails with type 2 diabetes mellitus     8. Controlled type 2 diabetes mellitus with microalbuminuria, without long-term current use of insulin     9. Family distress  Ambulatory referral to Psychiatry    Six month recheck with labs.   Rosuvastatin 40 instead of atorvastatin 40 if affordable.   He is a candidate for a low-dose CT screening test for lung cancer as well as for the smoking cessation program.  He is open to referral to psych department for counseling - referral placed, tel number given for pt to call.

## 2018-10-29 ENCOUNTER — OFFICE VISIT (OUTPATIENT)
Dept: OPHTHALMOLOGY | Facility: CLINIC | Age: 58
End: 2018-10-29
Payer: MEDICAID

## 2018-10-29 DIAGNOSIS — H52.03 HYPEROPIA WITH PRESBYOPIA, BILATERAL: ICD-10-CM

## 2018-10-29 DIAGNOSIS — E11.9 TYPE 2 DIABETES MELLITUS WITHOUT RETINOPATHY: Primary | ICD-10-CM

## 2018-10-29 DIAGNOSIS — H52.4 HYPEROPIA WITH PRESBYOPIA, BILATERAL: ICD-10-CM

## 2018-10-29 PROCEDURE — 92014 COMPRE OPH EXAM EST PT 1/>: CPT | Mod: S$PBB,,, | Performed by: OPTOMETRIST

## 2018-10-29 PROCEDURE — 99999 PR PBB SHADOW E&M-EST. PATIENT-LVL I: CPT | Mod: PBBFAC,,, | Performed by: OPTOMETRIST

## 2018-10-29 PROCEDURE — 92015 DETERMINE REFRACTIVE STATE: CPT | Mod: ,,, | Performed by: OPTOMETRIST

## 2018-10-29 PROCEDURE — 99211 OFF/OP EST MAY X REQ PHY/QHP: CPT | Mod: PBBFAC,PO | Performed by: OPTOMETRIST

## 2018-10-29 NOTE — PROGRESS NOTES
HPI     PTs last exxam was 10/13/16 with [facility]. PT c/o blurred near va and wears   otc readers prn.   Diabetic eye exam  Diagnosed with diabetes in 1994  Recent vision fluctuations no  Blood sugar: 6.4  HPI    Any vision changes since last exam: no  Eye pain: no  Other ocular symptoms: no    Do you wear currently wear glasses or contacts? otc readers    Interested in contacts today? no    Do you plan on getting new glasses today? If needed          Last edited by Juana Herrmann MA on 10/29/2018  4:16 PM. (History)            Assessment /Plan     For exam results, see Encounter Report.    Type 2 diabetes mellitus without retinopathy  No diabetic retinopathy OD, OS  Continue close care with PCP  Monitor 12 months    Hyperopia with presbyopia, bilateral  Eyeglass Final Rx     Eyeglass Final Rx       Sphere Cylinder Axis Add    Right +1.25 +0.25 180 +2.00    Left +1.25 +0.75 180 +2.00    Expiration Date:  10/30/2019              RTC 1 yr for dilated eye exam or PRN if any problems.   Discussed above and answered questions.

## 2018-10-29 NOTE — LETTER
October 29, 2018      Raisa Snow MD  [address] [place] [address] [address] [place] [address]           [facility]  [address] [facility] [place] [address]  Phone: [phone]  Fax: [phone]          Patient: Robert Sinha   MR Number: [id number]   YOB: 1960   Date of Visit: 10/29/2018       Dear Dr. Raisa Snow:    Thank you for referring Robert Gonzáles to me for evaluation. Attached you will find relevant portions of my assessment and plan of care.    If you have questions, please do not hesitate to call me. I look forward to following Robert Gonzáles along with you.    Sincerely,    Casey Harris, OD    Enclosure  CC:  No Recipients    If you would like to receive this communication electronically, please contact [email or web address] or [phone] to request more information on [employer] access.    For providers and/or their staff who would like to refer a patient to [facility], please contact us through our one-stop-shop provider referral line, [facility], at [phone].    If you feel you have received this communication in error or would no longer like to receive these types of communications, please e-mail [email or web address]

## 2018-10-31 ENCOUNTER — HOSPITAL ENCOUNTER (OUTPATIENT)
Dept: RADIOLOGY | Facility: HOSPITAL | Age: 58
Discharge: HOME | End: 2018-10-31
Attending: FAMILY MEDICINE

## 2018-10-31 DIAGNOSIS — R80.9 CONTROLLED TYPE 2 DIABETES MELLITUS WITH MICROALBUMINURIA, WITHOUT LONG-TERM CURRENT USE OF INSULIN: ICD-10-CM

## 2018-10-31 DIAGNOSIS — Z12.9 SCREENING FOR CANCER: ICD-10-CM

## 2018-10-31 DIAGNOSIS — E11.29 CONTROLLED TYPE 2 DIABETES MELLITUS WITH MICROALBUMINURIA, WITHOUT LONG-TERM CURRENT USE OF INSULIN: ICD-10-CM

## 2018-10-31 DIAGNOSIS — I10 HYPERTENSION, ESSENTIAL: ICD-10-CM

## 2018-10-31 DIAGNOSIS — E78.2 MIXED HYPERLIPIDEMIA: ICD-10-CM

## 2018-10-31 DIAGNOSIS — R93.89 ABNORMAL SCREENING CT OF CHEST: Primary | ICD-10-CM

## 2018-10-31 PROCEDURE — G0297 LDCT FOR LUNG CA SCREEN: HCPCS | Mod: TC,PO

## 2018-12-03 ENCOUNTER — CLINICAL SUPPORT (OUTPATIENT)
Dept: CARDIOLOGY | Facility: CLINIC | Age: 58
End: 2018-12-03
Payer: MEDICAID

## 2018-12-03 ENCOUNTER — OFFICE VISIT (OUTPATIENT)
Dept: URGENT CARE | Facility: CLINIC | Age: 58
End: 2018-12-03
Payer: MEDICAID

## 2018-12-03 VITALS
HEIGHT: 67 IN | DIASTOLIC BLOOD PRESSURE: 64 MMHG | BODY MASS INDEX: 25.28 KG/M2 | HEART RATE: 67 BPM | OXYGEN SATURATION: 98 % | WEIGHT: 161.06 LBS | SYSTOLIC BLOOD PRESSURE: 118 MMHG

## 2018-12-03 DIAGNOSIS — R07.89 CHEST DISCOMFORT: ICD-10-CM

## 2018-12-03 DIAGNOSIS — E11.9 DIABETES MELLITUS TYPE 2 WITHOUT RETINOPATHY: ICD-10-CM

## 2018-12-03 DIAGNOSIS — K21.9 GASTROESOPHAGEAL REFLUX DISEASE WITHOUT ESOPHAGITIS: Primary | ICD-10-CM

## 2018-12-03 PROCEDURE — 93005 ELECTROCARDIOGRAM TRACING: CPT | Mod: PBBFAC,PO | Performed by: INTERNAL MEDICINE

## 2018-12-03 PROCEDURE — 99213 OFFICE O/P EST LOW 20 MIN: CPT | Mod: PBBFAC,25,PO | Performed by: NURSE PRACTITIONER

## 2018-12-03 PROCEDURE — 93010 ELECTROCARDIOGRAM REPORT: CPT | Mod: S$PBB,,, | Performed by: INTERNAL MEDICINE

## 2018-12-03 PROCEDURE — 99213 OFFICE O/P EST LOW 20 MIN: CPT | Mod: S$PBB,,, | Performed by: NURSE PRACTITIONER

## 2018-12-03 PROCEDURE — 99999 PR PBB SHADOW E&M-EST. PATIENT-LVL III: CPT | Mod: PBBFAC,,, | Performed by: NURSE PRACTITIONER

## 2018-12-03 RX ORDER — GLIMEPIRIDE 2 MG/1
TABLET ORAL
Qty: 90 TABLET | Refills: 1 | Status: SHIPPED | OUTPATIENT
Start: 2018-12-03 | End: 2019-07-08 | Stop reason: SDUPTHER

## 2018-12-03 RX ORDER — OMEPRAZOLE 40 MG/1
40 CAPSULE, DELAYED RELEASE ORAL DAILY
Qty: 30 CAPSULE | Refills: 11 | Status: SHIPPED | OUTPATIENT
Start: 2018-12-03 | End: 2020-10-02

## 2018-12-03 NOTE — PATIENT INSTRUCTIONS
B?nh GERD (Ng??i l?n)    Th?c qu?n là m?t ?ng d?n th?c ?n t? mi?ng vào rose t?. M?t van ? ??u d??i cùng c?a th?c qu?n ng?n cho ch?t axit t? rose t? kh?i trào ng??c lên phía trên. Khi van này không ho?t ??ng thích h?p, các ch?t greg rose t? có th? liên t?c trào ng??c lên (trào ng??c) vào th?c qu?n. ?i?u này ???c g?i là b?nh trào ng??c vào ???ng th?c qu?n d? dày (GERD). GERD có th? gây khó ch?u cho th?c qu?n. ?i?u này có th? gây ra các tr? ng?i v? vi?c nu?t ho?c hít th?. Greg các tr??ng h?p nghiêm tr?ng, GERD có th? gây ra ch?ng viêm ph?i tái di?n ho?c các v?n ?? nghiêm tr?ng khác.  Các tri?u ch?ng c?a s? trào ng??c rose g?m, bu?t rát b? áp l?c ho?c ?au nhói ? vùng b?ng phía trên ho?c t? gi?a t?i ph?n ng?c phía d??i. C?n ?au này có th? ada truy?n t?i c?, l?ng, ho?c vai. Có th? b? ?, ?em l?i m?t v? axit ? phía tabitha c? h?ng, ho mãn tính ho?c ?au c? ho?c kh?n gi?ng. Các tri?u ch?ng c?a b?nh GERD th??ng x?y ra vào ban ngày tabitha m?t b?a ?n th?t no. Chúng c?ng có th? x?y ra vào ban ?êm khi n?m larson?ng.   Ch?m sóc t?i bridget  Vi?c thay ??i l?i s?ng làm gi?m các tri?u ch?ng. N?u c?n, thu?c có th? ???c kê toa. Các tri?u ch?ng th??ng c?i thi?n tabitha khi ?i?u tr?, nh?ng n?u ng?ng ?i?u tr?, các tri?u ch?ng th??ng tr? l?i tabitha m?t vài tháng. Vì th? ?a s? nh?ng ng??i b? b?nh GERD s? c?n ti?p t?c ?i?u tr?.  Các thay ??i v? l?i s?ng  · Gi?i h?n tránh ?n các th?c ph?m m? màng, chiên, và th?c ph?m gualberto, c?ng nh? cà phê, sô cô la, b?c hà, và th?c ph?m có yris?u ch?t axit nh? cà judith và trái cây và n??c trái cây có ch?t judith (cam, b??i, lali).  · Không ?n no, ??c bi?t là vào ban ?êm. ?n các b?a ?n nh? h?n, th??ng xuyên là t?t nh?t. Không n?m larson?ng ngay tabitha khi ?n. Và không ?n b?t c? th? gì greg 3 gi? tr??c khi ?i ng?.  · Tránh u?ng r??u và hút thu?c lá. Tránh vi?c hút thu?c lá gián ti?p, càng yris?u càng t?t.  · N?u quý v? quá n?ng cân, vi?c s?t cân s? gi?m b?t các tri?u ch?ng.   · Tránh m?c qu?n áo ch?t xung quanh vùng b?leoncio c?a clifton v?.  · N?u các  tri?u ch?ng c?a quý v? x?y ra greg khi ng?, hãy dùng m?t mi?ng ??m ?? nâng nugent ph?n trên c? th? c?a quý v? (không ch? cho ??u c?a quý v? mà thôi.) Ho?c, ??t các kh?i d?y 4 in s? bên d??i ??u gi??ng.  Thu?c  N?u c?n, thu?c có th? giúp làm gi?m các tri?u ch?ng c?a b?nh GERD và ng?n ng?a thi?t h?i cho th?c qu?n. Bàn v? m?t k? ho?ch dùng thu?c v?i nhân viên y t? c?a quý v?. ?i?u này có th? rose g?m m?t ho?c yris?u lo?i thu?c tabitha ?ây:  · Antacids (ch?ng axit) ?? giúp yonis hoà ch?t axit thông th??ng greg rose t? c?a quý v?.  · Ch?t ng?n ch?n axit (ch?t ng?n ch?n H2) ?? làm gi?m vi?c t?o ra ch?t axit.  · Ch?t ?c ch? axit (PPIs) ?? gi?m vi?c t?o ra axit francisco m?t cách khác h?n là các ch?t ng?n ch?n. Chúng có th? có tác d?ng t?t h?n, nh?ng có th? c?n h?i lâu h?n m?t chút ?? có tác d?ng.  Dùng m?t viên antacid (ch?ng axit) t? 30-60 phút tabitha khi ?n và vào lúc ?i ng?, nh?ng không cùng lúc v?i vi?c dùng ch?t ng?n ch?n axit.   C? g?ng không dùng các lo?i thu?c nh? ibuprofen và aspirin. N?u quý v? hi?n ?ang dùng aspirin cho silvia c?a mình ho?c các lý do y jude khác, hãy bàn v?i nhân viên y t? c?a quý v? v? vi?c ng?ng thu?c này l?i.  Ch?m sóc francisco dõi  Hãy francisco dõi v?i nhân viên y t? c?a quý v? ho?c francisco s? khuyên nh? nhân viên chúng tôi.  Khi nào ?i tìm s? khuyên nh? v? y t?  G?i nhân viên y t? c?a mình n?u b? b?t c? nh?ng ?i?u nào tabitha ?ây:  · Ch?ng ?au b?ng b? tr?m tr?ng h?n và matilde?n larson?ng ph?n b?ng d??i phía bên ph?i (vùng ru?t th?a)  · ?au ng?c m?i larson?t hi?n ho?c b? tr?m tr?ng h?n, ho?c ada ra t?i l?ng, c?, vai, ho?c cánh otis  · Ói m?a th??ng xuyên (không th? gi? cho ch?t l?ng kh?i trào ra ???c)  · Có máu greg phân ho?c ch?t ói (có màu ?? ho?c ?en)  · C?m th?y y?u ho?c chóng m?t  · S?t t? 100.4°F (38°C) tr? lên, ho?c francisco ch? d?n c?a nhân viên y t?  Date Last Reviewed: 6/23/2015  © 8286-7204 [employer]. [address] [place] [address], [place] [address]. All rights reserved. This information is not intended as a substitute  for professional medical care. Always follow your healthcare professional's instructions.        Tips to Control Acid Reflux    To control acid reflux, youll need to make some basic diet and lifestyle changes. The simple steps outlined below may be all youll need to ease discomfort.  Watch what you eat  · Avoid fatty foods and spicy foods.  · Eat fewer acidic foods, such as citrus and tomato-based foods. These can increase symptoms.  · Limit drinking alcohol, caffeine, and fizzy beverages. All increase acid reflux.  · Try limiting chocolate, peppermint, and spearmint. These can worsen acid reflux in some people.  Watch when you eat  · Avoid lying down for 3 hours after eating.  · Do not snack before going to bed.  Raise your head  Raising your head and upper body by 4 to 6 inches helps limit reflux when youre lying down. Put blocks under the head of your bed frame to raise it.  Other changes  · Lose weight, if you need to  · Dont exercise near bedtime  · Avoid tight-fitting clothes  · Limit aspirin and ibuprofen  · Stop smoking   Date Last Reviewed: 7/1/2016  © 6822-7244 [employer]. [address] [place] [address], [place] [address]. All rights reserved. This information is not intended as a substitute for professional medical care. Always follow your healthcare professional's instructions.

## 2018-12-03 NOTE — PROGRESS NOTES
Subjective:       Patient ID: Robert Sinha is a 58 y.o. male.    Chief Complaint: No chief complaint on file.    58 year old male presents to Urgent Care with reports of burning in chest - heartburn that has been present on and off for about 3 weeks. Denies any other problems or conerns.      Heartburn   He complains of heartburn. He reports no abdominal pain, no chest pain, no nausea or no sore throat. This is a new problem. The current episode started today. The heartburn wakes him from sleep. The heartburn does not limit his activity. The heartburn doesn't change with position. The symptoms are aggravated by caffeine and certain foods.     Review of Systems   Constitutional: Negative for appetite change, chills and fever.   HENT: Negative for ear pain, sinus pressure, sore throat and trouble swallowing.    Eyes: Negative for visual disturbance.   Respiratory: Negative for shortness of breath.    Cardiovascular: Negative for chest pain.   Gastrointestinal: Positive for heartburn. Negative for abdominal pain, diarrhea, nausea and vomiting.        Heartburn   Endocrine: Negative for cold intolerance, polyphagia and polyuria.   Genitourinary: Negative for decreased urine volume and dysuria.   Musculoskeletal: Negative for back pain.   Skin: Negative for rash.   Allergic/Immunologic: Negative for environmental allergies and food allergies.   Neurological: Negative for dizziness, tremors, weakness and numbness.   Hematological: Does not bruise/bleed easily.   Psychiatric/Behavioral: Negative for confusion and hallucinations. The patient is not nervous/anxious and is not hyperactive.    All other systems reviewed and are negative.      Objective:     Physical Exam   Constitutional: He is oriented to person, place, and time. He appears well-developed and well-nourished.   HENT:   Head: Normocephalic.   Right Ear: External ear normal.   Left Ear: External ear normal.   Nose: Nose normal.   Mouth/Throat: Oropharynx is  clear and moist.   Eyes: Conjunctivae and EOM are normal. Pupils are equal, round, and reactive to light.   Neck: Normal range of motion. Neck supple. No JVD present.   Cardiovascular: Normal rate, regular rhythm, normal heart sounds and intact distal pulses.   Pulmonary/Chest: Effort normal and breath sounds normal. He has no wheezes.   Abdominal: Soft. Bowel sounds are normal. There is no tenderness.   Lymphadenopathy:     He has no cervical adenopathy.   Neurological: He is alert and oriented to person, place, and time.   Skin: Skin is warm and dry.   Psychiatric: He has a normal mood and affect. His behavior is normal. Judgment and thought content normal.   Nursing note and vitals reviewed.    Assessment:     1. Gastroesophageal reflux disease without esophagitis    2. Chest discomfort      Plan:   Diagnoses and all orders for this visit:    Gastroesophageal reflux disease without esophagitis    Chest discomfort  -     EKG 12-lead; Future    Other orders  -     omeprazole (PRILOSEC) 40 MG capsule; Take 1 capsule (40 mg total) by mouth once daily.

## 2018-12-04 ENCOUNTER — TELEPHONE (OUTPATIENT)
Dept: INTERNAL MEDICINE | Facility: CLINIC | Age: 58
End: 2018-12-04

## 2018-12-04 NOTE — TELEPHONE ENCOUNTER
Spoke to patient and advised that his glimepiride (AMARYL) 2 MG tablet was sent to the pharmacy on yesterday.  Patient verbally understood.

## 2018-12-04 NOTE — TELEPHONE ENCOUNTER
----- Message from Alma Delia Haynes sent at 12/4/2018 10:12 AM CST -----  Contact: pt  He's calling in regards to meds refill,pls call pt back at [phone] (home)

## 2018-12-04 NOTE — TELEPHONE ENCOUNTER
Please advise.     ----- Message from Tra Pineda sent at 12/4/2018  3:37 PM CST -----  Contact: Pt   States he's calling rg getting a call to schedule an appt for a lung screening and there are no orders in the system /pt can be reached at [phone]//thnks/dbw

## 2018-12-04 NOTE — TELEPHONE ENCOUNTER
Please help the patient schedule the cardiology appointment.  The screening test showed atherosclerosis to his aorta and his coronary arteries.  I referred him for cardiology appointment.  The referral is in the system.

## 2018-12-05 NOTE — TELEPHONE ENCOUNTER
Spoke with the patient and informed him of him needing to schedule his cardiology referral. Patient did schedule his referral and verbally understood the appointment information given.

## 2018-12-07 DIAGNOSIS — E11.9 DIABETES MELLITUS TYPE 2 WITHOUT RETINOPATHY: ICD-10-CM

## 2018-12-07 RX ORDER — METFORMIN HYDROCHLORIDE 750 MG/1
TABLET, EXTENDED RELEASE ORAL
Qty: 180 TABLET | Refills: 3 | Status: SHIPPED | OUTPATIENT
Start: 2018-12-07 | End: 2019-12-09

## 2018-12-07 RX ORDER — METFORMIN HYDROCHLORIDE 750 MG/1
750 TABLET, EXTENDED RELEASE ORAL 2 TIMES DAILY
Qty: 180 TABLET | Refills: 1 | OUTPATIENT
Start: 2018-12-07

## 2018-12-07 NOTE — TELEPHONE ENCOUNTER
----- Message from Dana Cabezas sent at 12/7/2018  4:34 PM CST -----  Contact: self [phone]  States that he is calling to check status on refill request for metformin. Pt uses     [facility] [address] [place], [place] [address] [place] [address]  Phone: [phone] Fax: [phone]    States that he is out of medication. Please call back at [phone]//thank you acc

## 2018-12-10 ENCOUNTER — TELEPHONE (OUTPATIENT)
Dept: INTERNAL MEDICINE | Facility: CLINIC | Age: 58
End: 2018-12-10

## 2018-12-10 DIAGNOSIS — E11.9 DIABETES MELLITUS TYPE 2 WITHOUT RETINOPATHY: ICD-10-CM

## 2018-12-10 NOTE — TELEPHONE ENCOUNTER
Informed pt that metformin RX has been sent to requested pharmacy on file. Pt verbalized understanding of information.

## 2018-12-10 NOTE — TELEPHONE ENCOUNTER
----- Message from Ana Moore sent at 12/10/2018  7:51 AM CST -----  Contact: pt  Pt is completely out and needs to today before trip.     1. What is the name of the medication you are requesting? Metformin  2. What is the dose? .  3. How do you take the medication? Orally, topically, etc? .  4. How often do you take this medication? .  5. Do you need a 30 day or 90 day supply? .  6. How many refills are you requesting? .  7. What is your preferred pharmacy and location of the pharmacy? [facility]  8. Who can we contact with further questions? [phone]

## 2018-12-19 ENCOUNTER — TELEPHONE (OUTPATIENT)
Dept: INTERNAL MEDICINE | Facility: CLINIC | Age: 58
End: 2018-12-19

## 2018-12-19 DIAGNOSIS — E78.2 MIXED HYPERLIPIDEMIA: ICD-10-CM

## 2018-12-19 RX ORDER — ATORVASTATIN CALCIUM 40 MG/1
TABLET, FILM COATED ORAL
Qty: 30 TABLET | Refills: 0 | OUTPATIENT
Start: 2018-12-19

## 2018-12-19 NOTE — TELEPHONE ENCOUNTER
----- Message from Diane Dobbins sent at 12/19/2018 10:07 AM CST -----  Contact: patient  1. What is the name of the medication you are requesting? Atorvastatin  2. What is the dose? 40mg  3. How do you take the medication? Orally, topically, etc? orally  4. How often do you take this medication? Once daily  5. Do you need a 30 day or 90 day supply? 90  6. How many refills are you requesting?   7. What is your preferred pharmacy and location of the pharmacy? [facility]  8. Who can we contact with further questions? Patient

## 2018-12-20 RX ORDER — ROSUVASTATIN CALCIUM 40 MG/1
40 TABLET, COATED ORAL NIGHTLY
Qty: 90 TABLET | Refills: 3 | Status: SHIPPED | OUTPATIENT
Start: 2018-12-20 | End: 2020-01-07 | Stop reason: SDUPTHER

## 2018-12-20 NOTE — TELEPHONE ENCOUNTER
Patient called in regards to him needing a refill on his cholesterol medication. Patient last seen on 10/26/18. Patient states that he is leaving to go out of town on tonight and will need this medication.

## 2018-12-20 NOTE — TELEPHONE ENCOUNTER
Patient informed of his medication being sent to the pharmacy and verbally understood the information given.

## 2019-01-14 ENCOUNTER — OFFICE VISIT (OUTPATIENT)
Dept: CARDIOLOGY | Facility: CLINIC | Age: 59
End: 2019-01-14
Payer: MEDICAID

## 2019-01-14 VITALS
WEIGHT: 162.25 LBS | BODY MASS INDEX: 25.47 KG/M2 | HEART RATE: 80 BPM | SYSTOLIC BLOOD PRESSURE: 122 MMHG | HEIGHT: 67 IN | DIASTOLIC BLOOD PRESSURE: 60 MMHG

## 2019-01-14 DIAGNOSIS — R07.89 OTHER CHEST PAIN: Primary | ICD-10-CM

## 2019-01-14 DIAGNOSIS — F17.200 SMOKER: ICD-10-CM

## 2019-01-14 DIAGNOSIS — Z82.49 FAMILY HISTORY OF HEART DISEASE IN MALE FAMILY MEMBER BEFORE AGE 55: ICD-10-CM

## 2019-01-14 DIAGNOSIS — I10 ESSENTIAL HYPERTENSION: Chronic | ICD-10-CM

## 2019-01-14 DIAGNOSIS — E78.5 HYPERLIPIDEMIA, UNSPECIFIED HYPERLIPIDEMIA TYPE: Chronic | ICD-10-CM

## 2019-01-14 DIAGNOSIS — E11.9 DIABETES MELLITUS TYPE 2 WITHOUT RETINOPATHY: ICD-10-CM

## 2019-01-14 PROCEDURE — 99204 OFFICE O/P NEW MOD 45 MIN: CPT | Mod: S$PBB,,, | Performed by: INTERNAL MEDICINE

## 2019-01-14 PROCEDURE — 99213 OFFICE O/P EST LOW 20 MIN: CPT | Mod: PBBFAC | Performed by: INTERNAL MEDICINE

## 2019-01-14 PROCEDURE — 99204 PR OFFICE/OUTPT VISIT, NEW, LEVL IV, 45-59 MIN: ICD-10-PCS | Mod: S$PBB,,, | Performed by: INTERNAL MEDICINE

## 2019-01-14 PROCEDURE — 99999 PR PBB SHADOW E&M-EST. PATIENT-LVL III: CPT | Mod: PBBFAC,,, | Performed by: INTERNAL MEDICINE

## 2019-01-14 PROCEDURE — 99999 PR PBB SHADOW E&M-EST. PATIENT-LVL III: ICD-10-PCS | Mod: PBBFAC,,, | Performed by: INTERNAL MEDICINE

## 2019-01-14 NOTE — LETTER
January 14, 2019      Raisa Snow MD  [address] [place] [address] [address]aswinder [place] [address]           [facility]  [address] [place] [address]  [place] [address]  Phone: [phone]  Fax: [phone]          Patient: Robert Sinha   MR Number: [id number]   YOB: 1960   Date of Visit: 1/14/2019       Dear Dr. Raisa Snow:    Thank you for referring Robert Gonzáles to me for evaluation. Attached you will find relevant portions of my assessment and plan of care.    If you have questions, please do not hesitate to call me. I look forward to following Robert Gonzáles along with you.    Sincerely,    Ren Falk MD    Enclosure  CC:  No Recipients    If you would like to receive this communication electronically, please contact [email or web address] or [phone] to request more information on [facility] [employer] access.    For providers and/or their staff who would like to refer a patient to [facility], please contact us through our one-stop-shop provider referral line, [facility] , at [phone].    If you feel you have received this communication in error or would no longer like to receive these types of communications, please e-mail [email or web address]

## 2019-01-14 NOTE — PROGRESS NOTES
Subjective:   Patient ID:  Robert Sinha is a 58 y.o. male who presents for evaluation of Establish Care; Heartburn; and Numbness (right arm)      57 yo male, referred for chest pain.  PMH DM, HTN, HLD. No h/o heart attack, stroke and cancer. F/H premature CAD.  Burning sensation for few months, could last over 2 days, sometime, resolved after TUMS and Zantac. Could be triggered by spicy food. Recently, some right arm numbness in the past 3 days. Most at night time.  Plays soccer and [place] to [place] [employer].  Smoking 1/2 ppd for 40 yrs. Drinking beers.  Mother DM and heart attack at age of 52.          Past Medical History:   Diagnosis Date    Diabetes mellitus, type 2     Hyperlipidemia     Hypertension        Past Surgical History:   Procedure Laterality Date    COLONOSCOPY N/A 12/13/2016    Performed by J[facility]ro [facility] at [facility]    COLONOSCOPY W/ POLYPECTOMY  12/13/2016    polyp x 1/ Dr Grande; repeat 5 yrs (adenomatous tissue)       Social History     Tobacco Use    Smoking status: Current Every Day Smoker     Packs/day: 0.50     Types: Cigarettes    Smokeless tobacco: Never Used   Substance Use Topics    Alcohol use: Yes     Alcohol/week: 1.5 oz     Types: 3 drink(s) per week    Drug use: No       Family History   Problem Relation Age of Onset    Diabetes Mother     Diabetes Father     Heart disease Sister     Heart disease Brother     Heart disease Sister     Strabismus Neg Hx     Retinal detachment Neg Hx     Macular degeneration Neg Hx     Glaucoma Neg Hx     Blindness Neg Hx     Amblyopia Neg Hx        Review of Systems   Constitution: Negative for decreased appetite, diaphoresis, fever, weakness, malaise/fatigue and night sweats.   HENT: Negative for nosebleeds.    Eyes: Negative for blurred vision and double vision.   Cardiovascular: Positive for chest pain. Negative for claudication, dyspnea on exertion, irregular heartbeat, leg swelling, near-syncope, orthopnea,  palpitations, paroxysmal nocturnal dyspnea and syncope.   Respiratory: Negative for cough, shortness of breath, sleep disturbances due to breathing, snoring, sputum production and wheezing.    Endocrine: Negative for cold intolerance and polyuria.   Hematologic/Lymphatic: Does not bruise/bleed easily.   Skin: Negative for rash.   Musculoskeletal: Negative for back pain, falls, joint pain, joint swelling and neck pain.   Gastrointestinal: Negative for abdominal pain, heartburn, nausea and vomiting.   Genitourinary: Negative for dysuria, frequency and hematuria.   Neurological: Negative for difficulty with concentration, dizziness, focal weakness, headaches, light-headedness, numbness and seizures.   Psychiatric/Behavioral: Negative for depression, memory loss and substance abuse. The patient does not have insomnia.    Allergic/Immunologic: Negative for HIV exposure and hives.       Objective:   Physical Exam   Constitutional: He is oriented to person, place, and time. He appears well-nourished.   HENT:   Head: Normocephalic.   Eyes: Pupils are equal, round, and reactive to light.   Neck: Normal carotid pulses and no JVD present. Carotid bruit is not present. No thyromegaly present.   Cardiovascular: Normal rate, regular rhythm, normal heart sounds and normal pulses.  No extrasystoles are present. PMI is not displaced. Exam reveals no gallop and no S3.   No murmur heard.  Pulmonary/Chest: Breath sounds normal. No stridor. No respiratory distress.   Abdominal: Soft. Bowel sounds are normal. There is no tenderness. There is no rebound.   Musculoskeletal: Normal range of motion.   Neurological: He is alert and oriented to person, place, and time.   Skin: Skin is intact. No rash noted.   Psychiatric: His behavior is normal.       Lab Results   Component Value Date    CHOL 189 10/22/2018    CHOL 258 (H) 06/11/2018    CHOL 210 (H) 12/04/2017     Lab Results   Component Value Date    HDL 40 10/22/2018    HDL 30 (L)  06/11/2018    HDL 37 (L) 12/04/2017     Lab Results   Component Value Date    LDLCALC 125.0 10/22/2018    LDLCALC 186.2 (H) 06/11/2018    LDLCALC 143.2 12/04/2017     Lab Results   Component Value Date    TRIG 120 10/22/2018    TRIG 209 (H) 06/11/2018    TRIG 149 12/04/2017     Lab Results   Component Value Date    CHOLHDL 21.2 10/22/2018    CHOLHDL 11.6 (L) 06/11/2018    CHOLHDL 17.6 (L) 12/04/2017       Chemistry        Component Value Date/Time     (L) 10/22/2018 0806    K 4.2 10/22/2018 0806     10/22/2018 0806    CO2 24 10/22/2018 0806    BUN 18 10/22/2018 0806    CREATININE 0.9 10/22/2018 0806     (H) 10/22/2018 0806        Component Value Date/Time    CALCIUM 10.0 10/22/2018 0806    ALKPHOS 56 10/22/2018 0806    AST 22 10/22/2018 0806    ALT 23 10/22/2018 0806    BILITOT 0.6 10/22/2018 0806    ESTGFRAFRICA >60.0 10/22/2018 0806    EGFRNONAA >60.0 10/22/2018 0806          Lab Results   Component Value Date    HGBA1C 6.4 (H) 10/22/2018     No results found for: TSH  No results found for: INR, PROTIME  Lab Results   Component Value Date    WBC 7.53 06/11/2018    HGB 15.8 06/11/2018    HCT 46.5 06/11/2018    MCV 93 06/11/2018     06/11/2018     BNP  @LABRCNTIP(BNP,BNPTRIAGEBLO)@  CrCl cannot be calculated (Patient's most recent lab result is older than the maximum 7 days allowed.).  No results found in the last 24 hours.  No results found in the last 24 hours.  No results found in the last 24 hours.    Assessment:      1. Other chest pain    2. Essential hypertension    3. Hyperlipidemia, unspecified hyperlipidemia type    4. Diabetes mellitus type 2 without retinopathy    5. Smoker    6. Family history of heart disease in male family member before age 55      Atypical CP  Multiple risk factors of CHD: HTN, HLD, Dm, F/h premature CAD and Smoking  LDL, BP and A1c wnl  Good exercise capacity    Plan:   Exercise ECHO  Continue prinzide and statin  Smoking cessation  Continue current  meds.  Recommend heart-healthy diet, weight control and regular exercise.  John. Risk modification.   RTC in 1 yr  I have reviewed all pertinent labs and cardiac studies. Plans and recommendations have been formulated under my direct supervision. All questions answered and patient voiced understanding. Patient to continue current medications.

## 2019-01-16 ENCOUNTER — TELEPHONE (OUTPATIENT)
Dept: CARDIOLOGY | Facility: CLINIC | Age: 59
End: 2019-01-16

## 2019-01-16 ENCOUNTER — CLINICAL SUPPORT (OUTPATIENT)
Dept: CARDIOLOGY | Facility: CLINIC | Age: 59
End: 2019-01-16
Attending: INTERNAL MEDICINE
Payer: MEDICAID

## 2019-01-16 DIAGNOSIS — R07.89 OTHER CHEST PAIN: ICD-10-CM

## 2019-01-16 LAB
DIASTOLIC DYSFUNCTION: NO
MITRAL VALVE MOBILITY: NORMAL
RETIRED EF AND QEF - SEE NOTES: 60 (ref 55–65)

## 2019-01-16 PROCEDURE — 93321 DOPPLER ECHO F-UP/LMTD STD: CPT | Mod: 26,S$PBB,, | Performed by: INTERNAL MEDICINE

## 2019-01-16 PROCEDURE — 93321 EXERCISE STRESS ECHO: ICD-10-PCS | Mod: 26,S$PBB,, | Performed by: INTERNAL MEDICINE

## 2019-01-16 PROCEDURE — 93351 STRESS TTE COMPLETE: CPT | Mod: PBBFAC | Performed by: INTERNAL MEDICINE

## 2019-01-16 PROCEDURE — 93351 EXERCISE STRESS ECHO: ICD-10-PCS | Mod: 26,S$PBB,, | Performed by: INTERNAL MEDICINE

## 2019-01-16 NOTE — TELEPHONE ENCOUNTER
Elmo Falk! I just wanted you to know that Mr. Gonzáles  presented today for Exercise stress echo. In recovery the Pt had EKG changes from NSR to RBBB during recovery. The Pt reported that he did not have any CP, VS stable.

## 2019-01-17 ENCOUNTER — TELEPHONE (OUTPATIENT)
Dept: CARDIOLOGY | Facility: CLINIC | Age: 59
End: 2019-01-17

## 2019-01-17 NOTE — TELEPHONE ENCOUNTER
Spoke with pt with echo results.  Pt verbalized understanding.      ----- Message from Ren Falk MD sent at 1/16/2019  9:39 PM CST -----  Stress echo showed mild LVH and no ischemia

## 2019-02-26 ENCOUNTER — OFFICE VISIT (OUTPATIENT)
Dept: INTERNAL MEDICINE | Facility: CLINIC | Age: 59
End: 2019-02-26
Payer: MEDICAID

## 2019-02-26 VITALS
DIASTOLIC BLOOD PRESSURE: 65 MMHG | TEMPERATURE: 97 F | BODY MASS INDEX: 25.81 KG/M2 | HEART RATE: 65 BPM | WEIGHT: 164.44 LBS | OXYGEN SATURATION: 97 % | SYSTOLIC BLOOD PRESSURE: 113 MMHG | HEIGHT: 67 IN

## 2019-02-26 DIAGNOSIS — E11.9 DIABETES MELLITUS TYPE 2 WITHOUT RETINOPATHY: Primary | ICD-10-CM

## 2019-02-26 DIAGNOSIS — E78.2 MIXED HYPERLIPIDEMIA: ICD-10-CM

## 2019-02-26 DIAGNOSIS — I10 HYPERTENSION, ESSENTIAL: ICD-10-CM

## 2019-02-26 LAB
ALBUMIN SERPL BCP-MCNC: 4 G/DL
ALBUMIN/CREAT UR: 22.9 UG/MG
ALP SERPL-CCNC: 54 U/L
ALT SERPL W/O P-5'-P-CCNC: 30 U/L
ANION GAP SERPL CALC-SCNC: 8 MMOL/L
AST SERPL-CCNC: 22 U/L
BILIRUB SERPL-MCNC: 0.3 MG/DL
BUN SERPL-MCNC: 22 MG/DL
CALCIUM SERPL-MCNC: 9.5 MG/DL
CHLORIDE SERPL-SCNC: 103 MMOL/L
CHOLEST SERPL-MCNC: 182 MG/DL
CHOLEST/HDLC SERPL: 5.9 {RATIO}
CO2 SERPL-SCNC: 24 MMOL/L
CREAT SERPL-MCNC: 1 MG/DL
CREAT UR-MCNC: 131 MG/DL
EST. GFR  (AFRICAN AMERICAN): >60 ML/MIN/1.73 M^2
EST. GFR  (NON AFRICAN AMERICAN): >60 ML/MIN/1.73 M^2
GLUCOSE SERPL-MCNC: 155 MG/DL
HDLC SERPL-MCNC: 31 MG/DL
HDLC SERPL: 17 %
LDLC SERPL CALC-MCNC: 105.4 MG/DL
MICROALBUMIN UR DL<=1MG/L-MCNC: 30 UG/ML
NONHDLC SERPL-MCNC: 151 MG/DL
POTASSIUM SERPL-SCNC: 4.3 MMOL/L
PROT SERPL-MCNC: 7.4 G/DL
SODIUM SERPL-SCNC: 135 MMOL/L
TRIGL SERPL-MCNC: 228 MG/DL

## 2019-02-26 PROCEDURE — 83036 HEMOGLOBIN GLYCOSYLATED A1C: CPT

## 2019-02-26 PROCEDURE — 80061 LIPID PANEL: CPT

## 2019-02-26 PROCEDURE — 80053 COMPREHEN METABOLIC PANEL: CPT

## 2019-02-26 PROCEDURE — 99213 OFFICE O/P EST LOW 20 MIN: CPT | Mod: PBBFAC,PO | Performed by: FAMILY MEDICINE

## 2019-02-26 PROCEDURE — 99999 PR PBB SHADOW E&M-EST. PATIENT-LVL III: CPT | Mod: PBBFAC,,, | Performed by: FAMILY MEDICINE

## 2019-02-26 PROCEDURE — 99999 PR PBB SHADOW E&M-EST. PATIENT-LVL III: ICD-10-PCS | Mod: PBBFAC,,, | Performed by: FAMILY MEDICINE

## 2019-02-26 PROCEDURE — 82043 UR ALBUMIN QUANTITATIVE: CPT

## 2019-02-26 PROCEDURE — 99214 PR OFFICE/OUTPT VISIT, EST, LEVL IV, 30-39 MIN: ICD-10-PCS | Mod: S$PBB,,, | Performed by: FAMILY MEDICINE

## 2019-02-26 PROCEDURE — 99214 OFFICE O/P EST MOD 30 MIN: CPT | Mod: S$PBB,,, | Performed by: FAMILY MEDICINE

## 2019-02-26 NOTE — PROGRESS NOTES
Subjective:       Patient ID: Robert Sinha is a 58 y.o. male.    Chief Complaint: 4 mth f/u (diabetes)    Patient with type 2 diabetes, hypertension, hyperlipidemia here for scheduled recheck with no recent labs. He has not obtained his ordered labs. Lab Results       Component                Value               Date                       WBC                      7.53                06/11/2018                 HGB                      15.8                06/11/2018                 HCT                      46.5                06/11/2018                 PLT                      253                 06/11/2018                 CHOL                     189                 10/22/2018                 TRIG                     120                 10/22/2018                 HDL                      40                  10/22/2018                 LDLCALC                  125.0               10/22/2018                 ALT                      23                  10/22/2018                 AST                      22                  10/22/2018                 NA                       134 (L)             10/22/2018                 K                        4.2                 10/22/2018                 CL                       102                 10/22/2018                 CALCIUM                  10.0                10/22/2018                 CREATININE               0.9                 10/22/2018                 BUN                      18                  10/22/2018                 CO2                      24                  10/22/2018                 PSA                      0.32                05/16/2014                 GLU                      176 (H)             10/22/2018                 ESTGFRAFRICA             >60.0               10/22/2018                 EGFRNONAA                >60.0               10/22/2018                 HGBA1C                   6.4 (H)             10/22/2018                 MICALBCREAT               30.1 (H)            06/11/2018              He sts he was having tingling to RUE for about 3 weeks - it resolved. He sts he pulled his right low back lifting a case of water this weekend. His nephew gave him some chiropractic stretches exercises to do and feeling better. It is improving - much better than it was no meds taken.  BMI 26 - first time he has been in overweight category - states ate late last night with friends.  Last food 10:30 PM sushi, jamie crab, noodles, beef  He had tingling to his RUE which resolved about 2 weeks ago - no neck pain  He had screening CT chest - no nodules but calcifications seen and had cardiology eval.     Diabetes   He presents for his follow-up diabetic visit. He has type 2 diabetes mellitus. There are no hypoglycemic associated symptoms. Pertinent negatives for diabetes include no chest pain, no foot paresthesias, no foot ulcerations, no polydipsia, no polyphagia and no visual change. There are no hypoglycemic complications. Symptoms are stable. Current diabetic treatment includes oral agent (dual therapy). Exercise: at work he is active. Home blood sugar record trend: does not check. An ACE inhibitor/angiotensin II receptor blocker is being taken. Eye exam is current.     Review of Systems   HENT: Negative for congestion and rhinorrhea.    Respiratory: Negative for chest tightness and shortness of breath.    Cardiovascular: Negative for chest pain and leg swelling.   Endocrine: Negative for polydipsia and polyphagia.   Musculoskeletal: Positive for back pain (current muscle strain improving) and myalgias. Negative for neck pain and neck stiffness.   Neurological: Negative for numbness.       Objective:      Physical Exam   Constitutional: He is oriented to person, place, and time. He appears well-developed.   HENT:   Head: Normocephalic and atraumatic.   Cardiovascular: Normal rate, regular rhythm and normal heart sounds.   Pulmonary/Chest: Effort normal and breath sounds  normal.   Musculoskeletal: He exhibits no edema.   Neurological: He is alert and oriented to person, place, and time.   Skin: Skin is warm and dry.   Psychiatric: He has a normal mood and affect. His behavior is normal.         Assessment/Plan:     1. Diabetes mellitus type 2 without retinopathy  Hemoglobin A1c    Microalbumin/creatinine urine ratio    Hemoglobin A1c   2. Hypertension, essential  Comprehensive metabolic panel    CBC auto differential    Basic metabolic panel   3. Mixed hyperlipidemia  Lipid panel     Labs today and again in 6 months - will recheck microalb today - 0.1 into microalbuminuria range.  OK to continue taking the glimepiride as he is if numbers stay controlled.

## 2019-02-27 LAB
ESTIMATED AVG GLUCOSE: 163 MG/DL
HBA1C MFR BLD HPLC: 7.3 %

## 2019-04-16 ENCOUNTER — OFFICE VISIT (OUTPATIENT)
Dept: INTERNAL MEDICINE | Facility: CLINIC | Age: 59
End: 2019-04-16
Payer: MEDICAID

## 2019-04-16 VITALS
BODY MASS INDEX: 26.01 KG/M2 | DIASTOLIC BLOOD PRESSURE: 78 MMHG | WEIGHT: 161.81 LBS | SYSTOLIC BLOOD PRESSURE: 137 MMHG | HEIGHT: 66 IN | OXYGEN SATURATION: 98 % | TEMPERATURE: 98 F | HEART RATE: 76 BPM

## 2019-04-16 DIAGNOSIS — E11.9 DIABETES MELLITUS TYPE 2 WITHOUT RETINOPATHY: Primary | ICD-10-CM

## 2019-04-16 DIAGNOSIS — R20.2 PARESTHESIA OF RIGHT ARM: ICD-10-CM

## 2019-04-16 DIAGNOSIS — I10 HYPERTENSION, ESSENTIAL: ICD-10-CM

## 2019-04-16 DIAGNOSIS — E78.2 MIXED HYPERLIPIDEMIA: ICD-10-CM

## 2019-04-16 DIAGNOSIS — K21.9 GASTROESOPHAGEAL REFLUX DISEASE WITHOUT ESOPHAGITIS: ICD-10-CM

## 2019-04-16 DIAGNOSIS — M25.512 LEFT SHOULDER PAIN, UNSPECIFIED CHRONICITY: ICD-10-CM

## 2019-04-16 PROCEDURE — 99214 PR OFFICE/OUTPT VISIT, EST, LEVL IV, 30-39 MIN: ICD-10-PCS | Mod: S$PBB,,, | Performed by: FAMILY MEDICINE

## 2019-04-16 PROCEDURE — 99999 PR PBB SHADOW E&M-EST. PATIENT-LVL III: ICD-10-PCS | Mod: PBBFAC,,, | Performed by: FAMILY MEDICINE

## 2019-04-16 PROCEDURE — 99214 OFFICE O/P EST MOD 30 MIN: CPT | Mod: S$PBB,,, | Performed by: FAMILY MEDICINE

## 2019-04-16 PROCEDURE — 99999 PR PBB SHADOW E&M-EST. PATIENT-LVL III: CPT | Mod: PBBFAC,,, | Performed by: FAMILY MEDICINE

## 2019-04-16 PROCEDURE — 99213 OFFICE O/P EST LOW 20 MIN: CPT | Mod: PBBFAC,PO | Performed by: FAMILY MEDICINE

## 2019-04-16 NOTE — PROGRESS NOTES
Subjective:       Patient ID: Robert Sinha is a 59 y.o. male.    Chief Complaint: f/u DM    Patient with type 2 diabetes, hypertension, hyperlipidemia here for to review his labs performed at last visit 6 weeks ago - A1C up to 7.3 from 6.4. Plan is to get back on good diet and recheck August.  Lab Results       Component                Value               Date                       WBC                      7.53                06/11/2018                 HGB                      15.8                06/11/2018                 HCT                      46.5                06/11/2018                 PLT                      253                 06/11/2018                 CHOL                     182                 02/26/2019                 TRIG                     228 (H)             02/26/2019                 HDL                      31 (L)              02/26/2019                 LDLCALC                  105.4               02/26/2019                 ALT                      30                  02/26/2019                 AST                      22                  02/26/2019                 NA                       135 (L)             02/26/2019                 K                        4.3                 02/26/2019                 CL                       103                 02/26/2019                 CALCIUM                  9.5                 02/26/2019                 CREATININE               1.0                 02/26/2019                 BUN                      22 (H)              02/26/2019                 CO2                      24                  02/26/2019                 PSA                      0.32                05/16/2014                 GLU                      155 (H)             02/26/2019                 ESTGFRAFRICA             >60.0               02/26/2019                 EGFRNONAA                >60.0               02/26/2019                 HGBA1C                   7.3 (H)              02/26/2019                 MICALBCREAT              22.9                02/26/2019     Microalbuminuria no longer present.    Smoker - 6 daily now.       Diabetes   He presents for his follow-up diabetic visit. He has type 2 diabetes mellitus. His disease course has been stable. There are no hypoglycemic associated symptoms. Pertinent negatives for diabetes include no foot paresthesias, no polyphagia and no polyuria. There are no hypoglycemic complications. Symptoms are stable. There are no diabetic complications. Current diabetic treatment includes oral agent (dual therapy). His weight is fluctuating minimally. He is following a generally healthy diet. (No BS checks) An ACE inhibitor/angiotensin II receptor blocker is being taken. Eye exam is current.     Review of Systems   Endocrine: Negative for polyphagia and polyuria.   Musculoskeletal: Positive for arthralgias (left shoulder off and on - can be very severe - can be weak).   Neurological: Positive for numbness (xavier MCKEON feels like ants crawling at night, no problem with feet.).       Objective:      Physical Exam   Constitutional: He is oriented to person, place, and time. He appears well-developed.   HENT:   Head: Normocephalic and atraumatic.   Neck:   FROM neck with pain to upper left shoulder region with neck flexion, L lateral rotation, L lat flexion  Neck NTTP, Traps NTTP.   Cardiovascular: Normal rate, regular rhythm and normal heart sounds.   Pulmonary/Chest: Effort normal and breath sounds normal.   Musculoskeletal:   B shoulders FROM wiwth tightness to post left shoulder at endpts ROM  B shoulders NTTP   Neurological: He is alert and oriented to person, place, and time.   Skin: Skin is warm and dry.   Psychiatric: He has a normal mood and affect. His behavior is normal.         Assessment/Plan:     1. Diabetes mellitus type 2 without retinopathy     2. Mixed hyperlipidemia     3. Hypertension, essential     4. Gastroesophageal reflux disease without  esophagitis     5. Left shoulder pain, unspecified chronicity     6. Paresthesia of right arm     get labs in 3 months - move glimepiride to AM, not PM. Keep met BID.  Advised to limit second helpings.  Posture info given re neck position - experiment to see if that influences the weak episodes of LUE. See if it infl;uences the ant crawling sensation only occurring in evening to RUE.  Also discussed PPI vs H2 blocker - he is only using the ppi once a week.

## 2019-04-16 NOTE — PATIENT INSTRUCTIONS
"Start taking your 2mg glimepiride with breakfast or up to 30 minutes before breakfast.    All other medications can be taken the same way you take now.    Limit "second helpings" of carbohydrates like rice, potato, bread, noodles.  "

## 2019-07-08 DIAGNOSIS — E11.9 DIABETES MELLITUS TYPE 2 WITHOUT RETINOPATHY: ICD-10-CM

## 2019-07-09 RX ORDER — GLIMEPIRIDE 2 MG/1
TABLET ORAL
Qty: 90 TABLET | Refills: 0 | Status: SHIPPED | OUTPATIENT
Start: 2019-07-09 | End: 2019-10-04

## 2019-07-11 ENCOUNTER — CLINICAL SUPPORT (OUTPATIENT)
Dept: INTERNAL MEDICINE | Facility: CLINIC | Age: 59
End: 2019-07-11
Payer: MEDICAID

## 2019-07-11 DIAGNOSIS — I10 HYPERTENSION, ESSENTIAL: ICD-10-CM

## 2019-07-11 DIAGNOSIS — E11.9 DIABETES MELLITUS TYPE 2 WITHOUT RETINOPATHY: ICD-10-CM

## 2019-07-11 PROCEDURE — 36415 COLL VENOUS BLD VENIPUNCTURE: CPT | Mod: PBBFAC,PO

## 2019-07-11 PROCEDURE — 83036 HEMOGLOBIN GLYCOSYLATED A1C: CPT

## 2019-07-11 PROCEDURE — 80048 BASIC METABOLIC PNL TOTAL CA: CPT

## 2019-07-11 PROCEDURE — 85025 COMPLETE CBC W/AUTO DIFF WBC: CPT

## 2019-07-12 LAB
ANION GAP SERPL CALC-SCNC: 11 MMOL/L (ref 8–16)
BASOPHILS # BLD AUTO: 0.04 K/UL (ref 0–0.2)
BASOPHILS NFR BLD: 0.5 % (ref 0–1.9)
BUN SERPL-MCNC: 16 MG/DL (ref 6–20)
CALCIUM SERPL-MCNC: 9.4 MG/DL (ref 8.7–10.5)
CHLORIDE SERPL-SCNC: 105 MMOL/L (ref 95–110)
CO2 SERPL-SCNC: 22 MMOL/L (ref 23–29)
CREAT SERPL-MCNC: 0.9 MG/DL (ref 0.5–1.4)
DIFFERENTIAL METHOD: ABNORMAL
EOSINOPHIL # BLD AUTO: 0.3 K/UL (ref 0–0.5)
EOSINOPHIL NFR BLD: 3.1 % (ref 0–8)
ERYTHROCYTE [DISTWIDTH] IN BLOOD BY AUTOMATED COUNT: 12.4 % (ref 11.5–14.5)
EST. GFR  (AFRICAN AMERICAN): >60 ML/MIN/1.73 M^2
EST. GFR  (NON AFRICAN AMERICAN): >60 ML/MIN/1.73 M^2
ESTIMATED AVG GLUCOSE: 151 MG/DL (ref 68–131)
GLUCOSE SERPL-MCNC: 117 MG/DL (ref 70–110)
HBA1C MFR BLD HPLC: 6.9 % (ref 4–5.6)
HCT VFR BLD AUTO: 43.2 % (ref 40–54)
HGB BLD-MCNC: 14.5 G/DL (ref 14–18)
IMM GRANULOCYTES # BLD AUTO: 0.04 K/UL (ref 0–0.04)
IMM GRANULOCYTES NFR BLD AUTO: 0.5 % (ref 0–0.5)
LYMPHOCYTES # BLD AUTO: 2.4 K/UL (ref 1–4.8)
LYMPHOCYTES NFR BLD: 27.5 % (ref 18–48)
MCH RBC QN AUTO: 31.7 PG (ref 27–31)
MCHC RBC AUTO-ENTMCNC: 33.6 G/DL (ref 32–36)
MCV RBC AUTO: 95 FL (ref 82–98)
MONOCYTES # BLD AUTO: 0.9 K/UL (ref 0.3–1)
MONOCYTES NFR BLD: 9.6 % (ref 4–15)
NEUTROPHILS # BLD AUTO: 5.2 K/UL (ref 1.8–7.7)
NEUTROPHILS NFR BLD: 58.8 % (ref 38–73)
NRBC BLD-RTO: 0 /100 WBC
PLATELET # BLD AUTO: 229 K/UL (ref 150–350)
PMV BLD AUTO: 9.2 FL (ref 9.2–12.9)
POTASSIUM SERPL-SCNC: 4.1 MMOL/L (ref 3.5–5.1)
RBC # BLD AUTO: 4.57 M/UL (ref 4.6–6.2)
SODIUM SERPL-SCNC: 138 MMOL/L (ref 136–145)
WBC # BLD AUTO: 8.81 K/UL (ref 3.9–12.7)

## 2019-07-16 ENCOUNTER — OFFICE VISIT (OUTPATIENT)
Dept: INTERNAL MEDICINE | Facility: CLINIC | Age: 59
End: 2019-07-16
Payer: MEDICAID

## 2019-07-16 VITALS
TEMPERATURE: 97 F | DIASTOLIC BLOOD PRESSURE: 67 MMHG | OXYGEN SATURATION: 98 % | SYSTOLIC BLOOD PRESSURE: 119 MMHG | HEIGHT: 66 IN | BODY MASS INDEX: 25.63 KG/M2 | HEART RATE: 57 BPM | WEIGHT: 159.5 LBS

## 2019-07-16 DIAGNOSIS — E11.69 HYPERLIPIDEMIA ASSOCIATED WITH TYPE 2 DIABETES MELLITUS: ICD-10-CM

## 2019-07-16 DIAGNOSIS — E11.9 DIABETES MELLITUS TYPE 2 WITHOUT RETINOPATHY: Primary | ICD-10-CM

## 2019-07-16 DIAGNOSIS — E78.5 HYPERLIPIDEMIA ASSOCIATED WITH TYPE 2 DIABETES MELLITUS: ICD-10-CM

## 2019-07-16 DIAGNOSIS — Z12.5 SCREENING FOR PROSTATE CANCER: ICD-10-CM

## 2019-07-16 DIAGNOSIS — I10 HYPERTENSION, ESSENTIAL: ICD-10-CM

## 2019-07-16 LAB — COMPLEXED PSA SERPL-MCNC: 0.25 NG/ML (ref 0–4)

## 2019-07-16 PROCEDURE — 99213 OFFICE O/P EST LOW 20 MIN: CPT | Mod: PBBFAC,PO | Performed by: FAMILY MEDICINE

## 2019-07-16 PROCEDURE — 99214 OFFICE O/P EST MOD 30 MIN: CPT | Mod: S$PBB,,, | Performed by: FAMILY MEDICINE

## 2019-07-16 PROCEDURE — 99999 PR PBB SHADOW E&M-EST. PATIENT-LVL III: ICD-10-PCS | Mod: PBBFAC,,, | Performed by: FAMILY MEDICINE

## 2019-07-16 PROCEDURE — 99999 PR PBB SHADOW E&M-EST. PATIENT-LVL III: CPT | Mod: PBBFAC,,, | Performed by: FAMILY MEDICINE

## 2019-07-16 PROCEDURE — 84153 ASSAY OF PSA TOTAL: CPT

## 2019-07-16 PROCEDURE — 99214 PR OFFICE/OUTPT VISIT, EST, LEVL IV, 30-39 MIN: ICD-10-PCS | Mod: S$PBB,,, | Performed by: FAMILY MEDICINE

## 2019-07-16 RX ORDER — CYCLOBENZAPRINE HCL 10 MG
TABLET ORAL
Refills: 0 | COMMUNITY
Start: 2019-06-26

## 2019-10-04 DIAGNOSIS — E11.9 DIABETES MELLITUS TYPE 2 WITHOUT RETINOPATHY: ICD-10-CM

## 2019-10-04 RX ORDER — GLIMEPIRIDE 2 MG/1
TABLET ORAL
Qty: 90 TABLET | Refills: 4 | Status: SHIPPED | OUTPATIENT
Start: 2019-10-04 | End: 2020-01-07 | Stop reason: SDUPTHER

## 2019-11-07 DIAGNOSIS — I10 HYPERTENSION, ESSENTIAL: ICD-10-CM

## 2019-11-07 RX ORDER — LISINOPRIL AND HYDROCHLOROTHIAZIDE 12.5; 2 MG/1; MG/1
1 TABLET ORAL DAILY
Qty: 90 TABLET | Refills: 0 | Status: SHIPPED | OUTPATIENT
Start: 2019-11-07 | End: 2020-01-07 | Stop reason: SDUPTHER

## 2019-11-07 RX ORDER — LISINOPRIL AND HYDROCHLOROTHIAZIDE 12.5; 2 MG/1; MG/1
1 TABLET ORAL DAILY
Qty: 90 TABLET | Refills: 3 | Status: CANCELLED | OUTPATIENT
Start: 2019-11-07 | End: 2020-11-06

## 2019-11-07 NOTE — TELEPHONE ENCOUNTER
----- Message from Ernestine Lau sent at 11/7/2019  7:04 AM CST -----  Contact: pt   Call pt in regards to rescheduling his nurse visit.    [phone] ([place])

## 2019-11-08 NOTE — TELEPHONE ENCOUNTER
Pt informed medication requested has been sent to pharmacy on file. Pt verbalized understanding of information.

## 2019-12-09 ENCOUNTER — CLINICAL SUPPORT (OUTPATIENT)
Dept: INTERNAL MEDICINE | Facility: CLINIC | Age: 59
End: 2019-12-09

## 2019-12-09 DIAGNOSIS — E11.69 HYPERLIPIDEMIA ASSOCIATED WITH TYPE 2 DIABETES MELLITUS: ICD-10-CM

## 2019-12-09 DIAGNOSIS — E11.9 DIABETES MELLITUS TYPE 2 WITHOUT RETINOPATHY: ICD-10-CM

## 2019-12-09 DIAGNOSIS — E78.5 HYPERLIPIDEMIA ASSOCIATED WITH TYPE 2 DIABETES MELLITUS: ICD-10-CM

## 2019-12-09 DIAGNOSIS — I10 HYPERTENSION, ESSENTIAL: ICD-10-CM

## 2019-12-09 RX ORDER — LISINOPRIL AND HYDROCHLOROTHIAZIDE 12.5; 2 MG/1; MG/1
1 TABLET ORAL DAILY
Qty: 90 TABLET | Refills: 0 | Status: CANCELLED | OUTPATIENT
Start: 2019-12-09 | End: 2020-12-08

## 2019-12-10 RX ORDER — METFORMIN HYDROCHLORIDE 750 MG/1
TABLET, EXTENDED RELEASE ORAL
Qty: 180 TABLET | Refills: 4 | Status: SHIPPED | OUTPATIENT
Start: 2019-12-10 | End: 2020-03-09 | Stop reason: SDUPTHER

## 2019-12-31 ENCOUNTER — PATIENT OUTREACH (OUTPATIENT)
Dept: ADMINISTRATIVE | Facility: HOSPITAL | Age: 59
End: 2019-12-31

## 2019-12-31 NOTE — LETTER
December 31, 2019        Robert Sinha  [address] [address]      Dear Mr. Gonzáles,    You have an upcoming appointment with Raisa Snow MD on 01/14/19.      Your chart is indicating you may be due for the following and I will be happy to assist you in scheduling any needed appointments:  Health Maintenance Due   Topic    Eye Exam     Urine Microalbumin           If you have had any of the above done at another facility, please bring the records or information with you so that your record at [facility] will be complete.    We will be happy to assist you with scheduling any necessary appointments or you may contact the [facility] appointment desk at [phone] to schedule at your convenience.     Thank you for choosing [facility] for your healthcare needs,      Sonia C., LPN Care Coordinator  [facility]  [phone]

## 2020-01-07 DIAGNOSIS — E11.9 DIABETES MELLITUS TYPE 2 WITHOUT RETINOPATHY: ICD-10-CM

## 2020-01-07 DIAGNOSIS — E78.2 MIXED HYPERLIPIDEMIA: ICD-10-CM

## 2020-01-07 DIAGNOSIS — I10 HYPERTENSION, ESSENTIAL: ICD-10-CM

## 2020-01-07 RX ORDER — GLIMEPIRIDE 2 MG/1
TABLET ORAL
Qty: 90 TABLET | Refills: 4 | Status: SHIPPED | OUTPATIENT
Start: 2020-01-07 | End: 2021-01-15 | Stop reason: SDUPTHER

## 2020-01-07 RX ORDER — ROSUVASTATIN CALCIUM 40 MG/1
40 TABLET, COATED ORAL NIGHTLY
Qty: 90 TABLET | Refills: 4 | Status: SHIPPED | OUTPATIENT
Start: 2020-01-07 | End: 2021-01-15 | Stop reason: SDUPTHER

## 2020-01-07 RX ORDER — LISINOPRIL AND HYDROCHLOROTHIAZIDE 12.5; 2 MG/1; MG/1
1 TABLET ORAL DAILY
Qty: 90 TABLET | Refills: 0 | Status: SHIPPED | OUTPATIENT
Start: 2020-01-07 | End: 2020-05-04 | Stop reason: SDUPTHER

## 2020-01-07 NOTE — TELEPHONE ENCOUNTER
----- Message from Ann Jones sent at 1/7/2020 12:51 PM CST -----  Contact: self  Type:  RX Refill Request    Who Called: pt  Refill or New Rx:refill  RX Name and Strength:cholesteral/fluid pill/diabetes  How is the patient currently taking it? (ex. 1XDay):n/a  Is this a 30 day or 90 day RX:n/a  Preferred Pharmacy with phone number:  [facility] #[id number] [place] [id number] [facility] & [address] [address]  Phone: [phone] Fax: [phone]  Local or Mail Order:local  Ordering Provider:rudi  Would the patient rather a call back or a response via [facility]? Call back  Best Call Back Number:[phone]  Additional Information: none    Thanks,  Ann Jones

## 2020-01-09 ENCOUNTER — CLINICAL SUPPORT (OUTPATIENT)
Dept: INTERNAL MEDICINE | Facility: CLINIC | Age: 60
End: 2020-01-09
Payer: COMMERCIAL

## 2020-01-09 LAB
ALBUMIN SERPL BCP-MCNC: 4 G/DL (ref 3.5–5.2)
ALP SERPL-CCNC: 48 U/L (ref 55–135)
ALT SERPL W/O P-5'-P-CCNC: 25 U/L (ref 10–44)
ANION GAP SERPL CALC-SCNC: 8 MMOL/L (ref 8–16)
AST SERPL-CCNC: 21 U/L (ref 10–40)
BILIRUB SERPL-MCNC: 0.4 MG/DL (ref 0.1–1)
BUN SERPL-MCNC: 21 MG/DL (ref 6–20)
CALCIUM SERPL-MCNC: 9.5 MG/DL (ref 8.7–10.5)
CHLORIDE SERPL-SCNC: 102 MMOL/L (ref 95–110)
CHOLEST SERPL-MCNC: 193 MG/DL (ref 120–199)
CHOLEST/HDLC SERPL: 4.7 {RATIO} (ref 2–5)
CO2 SERPL-SCNC: 24 MMOL/L (ref 23–29)
CREAT SERPL-MCNC: 1 MG/DL (ref 0.5–1.4)
EST. GFR  (AFRICAN AMERICAN): >60 ML/MIN/1.73 M^2
EST. GFR  (NON AFRICAN AMERICAN): >60 ML/MIN/1.73 M^2
GLUCOSE SERPL-MCNC: 185 MG/DL (ref 70–110)
HDLC SERPL-MCNC: 41 MG/DL (ref 40–75)
HDLC SERPL: 21.2 % (ref 20–50)
LDLC SERPL CALC-MCNC: 120.4 MG/DL (ref 63–159)
NONHDLC SERPL-MCNC: 152 MG/DL
POTASSIUM SERPL-SCNC: 4 MMOL/L (ref 3.5–5.1)
PROT SERPL-MCNC: 7.3 G/DL (ref 6–8.4)
SODIUM SERPL-SCNC: 134 MMOL/L (ref 136–145)
TRIGL SERPL-MCNC: 158 MG/DL (ref 30–150)

## 2020-01-09 PROCEDURE — 80053 COMPREHEN METABOLIC PANEL: CPT

## 2020-01-09 PROCEDURE — 80061 LIPID PANEL: CPT

## 2020-01-09 PROCEDURE — 83036 HEMOGLOBIN GLYCOSYLATED A1C: CPT

## 2020-01-09 NOTE — PROGRESS NOTES
Lab collected from previous lab visit, informed will call with results, Verbalized understanding.

## 2020-01-10 LAB
ESTIMATED AVG GLUCOSE: 169 MG/DL (ref 68–131)
HBA1C MFR BLD HPLC: 7.5 % (ref 4–5.6)

## 2020-01-14 ENCOUNTER — OFFICE VISIT (OUTPATIENT)
Dept: INTERNAL MEDICINE | Facility: CLINIC | Age: 60
End: 2020-01-14
Payer: COMMERCIAL

## 2020-01-14 VITALS
BODY MASS INDEX: 26.28 KG/M2 | TEMPERATURE: 98 F | DIASTOLIC BLOOD PRESSURE: 72 MMHG | SYSTOLIC BLOOD PRESSURE: 129 MMHG | OXYGEN SATURATION: 95 % | HEIGHT: 66 IN | WEIGHT: 163.5 LBS | HEART RATE: 72 BPM

## 2020-01-14 DIAGNOSIS — E78.5 HYPERLIPIDEMIA ASSOCIATED WITH TYPE 2 DIABETES MELLITUS: ICD-10-CM

## 2020-01-14 DIAGNOSIS — K21.9 GASTROESOPHAGEAL REFLUX DISEASE, ESOPHAGITIS PRESENCE NOT SPECIFIED: ICD-10-CM

## 2020-01-14 DIAGNOSIS — E11.9 DIABETES MELLITUS TYPE 2 WITHOUT RETINOPATHY: Primary | ICD-10-CM

## 2020-01-14 DIAGNOSIS — E11.69 HYPERLIPIDEMIA ASSOCIATED WITH TYPE 2 DIABETES MELLITUS: ICD-10-CM

## 2020-01-14 DIAGNOSIS — I10 HYPERTENSION, ESSENTIAL: ICD-10-CM

## 2020-01-14 PROCEDURE — 3008F BODY MASS INDEX DOCD: CPT | Mod: CPTII,S$GLB,, | Performed by: FAMILY MEDICINE

## 2020-01-14 PROCEDURE — 99214 OFFICE O/P EST MOD 30 MIN: CPT | Mod: S$GLB,,, | Performed by: FAMILY MEDICINE

## 2020-01-14 PROCEDURE — 99214 PR OFFICE/OUTPT VISIT, EST, LEVL IV, 30-39 MIN: ICD-10-PCS | Mod: S$GLB,,, | Performed by: FAMILY MEDICINE

## 2020-01-14 PROCEDURE — 82043 UR ALBUMIN QUANTITATIVE: CPT

## 2020-01-14 PROCEDURE — 99999 PR PBB SHADOW E&M-EST. PATIENT-LVL IV: CPT | Mod: PBBFAC,,, | Performed by: FAMILY MEDICINE

## 2020-01-14 PROCEDURE — 3074F PR MOST RECENT SYSTOLIC BLOOD PRESSURE < 130 MM HG: ICD-10-PCS | Mod: CPTII,S$GLB,, | Performed by: FAMILY MEDICINE

## 2020-01-14 PROCEDURE — 3078F DIAST BP <80 MM HG: CPT | Mod: CPTII,S$GLB,, | Performed by: FAMILY MEDICINE

## 2020-01-14 PROCEDURE — 3074F SYST BP LT 130 MM HG: CPT | Mod: CPTII,S$GLB,, | Performed by: FAMILY MEDICINE

## 2020-01-14 PROCEDURE — 3078F PR MOST RECENT DIASTOLIC BLOOD PRESSURE < 80 MM HG: ICD-10-PCS | Mod: CPTII,S$GLB,, | Performed by: FAMILY MEDICINE

## 2020-01-14 PROCEDURE — 99999 PR PBB SHADOW E&M-EST. PATIENT-LVL IV: ICD-10-PCS | Mod: PBBFAC,,, | Performed by: FAMILY MEDICINE

## 2020-01-14 PROCEDURE — 3008F PR BODY MASS INDEX (BMI) DOCUMENTED: ICD-10-PCS | Mod: CPTII,S$GLB,, | Performed by: FAMILY MEDICINE

## 2020-01-14 RX ORDER — FAMOTIDINE 40 MG/1
40 TABLET, FILM COATED ORAL DAILY PRN
Qty: 30 TABLET | Refills: 11 | Status: SHIPPED | OUTPATIENT
Start: 2020-01-14 | End: 2021-01-21

## 2020-01-14 NOTE — PROGRESS NOTES
"Subjective:       Patient ID: Robert Sinha is a 59 y.o. male.    Chief Complaint: follow-up lab and discuss heart burn    Patient with type 2 diabetes, hypertension, hyperlipidemia here for scheduled 6 month recheck with recent labs.  Also c/o heartburn. Usuing pepcid complete or "gastrogone" prn - last month he had a problem with recurrence every day for a week - usually only has 1-2 AM occurrence if eats acidic food/sour food after 3-4 PM. Tries not to use the PPI.  Lab Results       Component                Value               Date                       WBC                      8.81                07/11/2019                 HGB                      14.5                07/11/2019                 HCT                      43.2                07/11/2019                 PLT                      229                 07/11/2019                 CHOL                     193                 01/09/2020                 TRIG                     158 (H)             01/09/2020                 HDL                      41                  01/09/2020                 LDLCALC                  120.4               01/09/2020                 ALT                      25                  01/09/2020                 AST                      21                  01/09/2020                 NA                       134 (L)             01/09/2020                 K                        4.0                 01/09/2020                 CL                       102                 01/09/2020                 CALCIUM                  9.5                 01/09/2020                 CREATININE               1.0                 01/09/2020                 BUN                      21 (H)              01/09/2020                 CO2                      24                  01/09/2020                 PSA                      0.25                07/16/2019                 GLU                      185 (H)             01/09/2020                 ESTGFRAFRICA     "         >60.0               01/09/2020                 EGFRNONAA                >60.0               01/09/2020                 HGBA1C                   7.5 (H)             01/09/2020                 MICALBCREAT              22.9                02/26/2019              A1C back up - microalb due today.  Lab Results       Component                Value               Date                       HGBA1C                   7.5 (H)             01/09/2020                 HGBA1C                   6.9 (H)             07/11/2019                 HGBA1C                   7.3 (H)             02/26/2019                 HGBA1C                   6.4 (H)             10/22/2018                 HGBA1C                   6.4 (H)             06/11/2018                 HGBA1C                   8.7 (H)             12/04/2017            Diabetes Medications        glimepiride (AMARYL) 2 MG tablet TAKE 1 TABLET(2 MG) BY MOUTH BEFORE BREAKFAST    metFORMIN (GLUCOPHAGE-XR) 750 MG 24 hr tablet TAKE 1 TABLET(750 MG) BY MOUTH TWICE DAILY        BP Readings from Last 3 Encounters:  01/14/20 : 129/72  07/16/19 : 119/67  04/16/19 : 137/78  HTN controlled on current Hypertension Medications        lisinopril-hydrochlorothiazide (PRINZIDE,ZESTORETIC) 20-12.5 mg per tablet Take 1 tablet by mouth once daily.      Worsened lipids on current rosuvastatin 40, changed from artorvastatin 40.   Lab Results       Component                Value               Date                       CHOL                     193                 01/09/2020                 CHOL                     182                 02/26/2019                 CHOL                     189                 10/22/2018            Lab Results       Component                Value               Date                       HDL                      41                  01/09/2020                 HDL                      31 (L)              02/26/2019                 HDL                      40                   10/22/2018            Lab Results       Component                Value               Date                       LDLCALC                  120.4               01/09/2020                 LDLCALC                  105.4               02/26/2019                 LDLCALC                  125.0               10/22/2018            Lab Results       Component                Value               Date                       TRIG                     158 (H)             01/09/2020                 TRIG                     228 (H)             02/26/2019                 TRIG                     120                 10/22/2018                Diabetes   He presents for his follow-up diabetic visit. He has type 2 diabetes mellitus. His disease course has been worsening. There are no hypoglycemic associated symptoms. Pertinent negatives for diabetes include no foot paresthesias. There are no hypoglycemic complications. Pertinent negatives for diabetic complications include no nephropathy, peripheral neuropathy or retinopathy. Current diabetic treatment includes oral agent (dual therapy). He is following a generally healthy diet. He rarely participates in exercise. (No BS checks) An ACE inhibitor/angiotensin II receptor blocker is being taken. He does not see a podiatrist.Eye exam is not current.     Review of Systems   Constitutional: Negative for fever.   HENT: Positive for congestion.    Respiratory: Positive for cough. Negative for shortness of breath.    Gastrointestinal: Positive for abdominal pain (epigastric pain - last 2 weeks off and on).       Objective:      Physical Exam   Constitutional: He is oriented to person, place, and time. He appears well-developed and well-nourished.   HENT:   Head: Normocephalic and atraumatic.   Right Ear: External ear normal.   Left Ear: External ear normal.   Neck: Normal range of motion. Neck supple.   Cardiovascular: Normal rate, regular rhythm and normal heart sounds.   No murmur  heard.  Pulmonary/Chest: Effort normal and breath sounds normal. No respiratory distress. He has no wheezes.   Abdominal: Soft. Bowel sounds are normal. He exhibits no distension. There is no tenderness.   Neurological: He is alert and oriented to person, place, and time.   Skin: Skin is warm and dry.   Psychiatric: He has a normal mood and affect. His behavior is normal.         Assessment/Plan:     1. Diabetes mellitus type 2 without retinopathy  Microalbumin/creatinine urine ratio    Hemoglobin A1c   2. Hypertension, essential     3. Hyperlipidemia associated with type 2 diabetes mellitus     4. Gastroesophageal reflux disease, esophagitis presence not specified  famotidine (PEPCID) 40 MG tablet     Will recheck in 4 months with A1C -diet changes for lipids advised - he wants to start back exercising.  Try taking pepcid 40 daily. Reviewed lifestyle recs for GERD.  Discussed increasing glimepiride to 4 mg daily - he wants to retry with diet exercise.

## 2020-01-14 NOTE — PATIENT INSTRUCTIONS
B?nh GERD (Ng??i l?n)    Th?c qu?n là m?t ?ng d?n th?c ?n t? mi?ng vào rose t?. M?t van ? ??u d??i cùng c?a th?c qu?n ng?n cho ch?t axit t? rose t? kh?i trào ng??c lên phía trên. Khi van này không ho?t ??ng thích h?p, các ch?t greg rose t? có th? liên t?c trào ng??c lên (trào ng??c) vào th?c qu?n. ?i?u này ???c g?i là b?nh trào ng??c vào ???ng th?c qu?n d? dày (GERD). GERD có th? gây khó ch?u cho th?c qu?n. ?i?u này có th? gây ra các tr? ng?i v? vi?c nu?t ho?c hít th?. Greg các tr??ng h?p nghiêm tr?ng, GERD có th? gây ra ch?ng viêm ph?i tái di?n ho?c các v?n ?? nghiêm tr?ng khác.  Các tri?u ch?ng c?a s? trào ng??c rose g?m, bu?t rát b? áp l?c ho?c ?au nhói ? vùng b?ng phía trên ho?c t? gi?a t?i ph?n ng?c phía d??i. C?n ?au này có th? ada truy?n t?i c?, l?ng, ho?c vai. Có th? b? ?, ?em l?i m?t v? axit ? phía tabitha c? h?ng, ho mãn tính ho?c ?au c? ho?c kh?n gi?ng. Các tri?u ch?ng c?a b?nh GERD th??ng x?y ra vào ban ngày tabitha m?t b?a ?n th?t no. Chúng c?ng có th? x?y ra vào ban ?êm khi n?m larson?ng.   Ch?m sóc t?i bridget  Vi?c thay ??i l?i s?ng làm gi?m các tri?u ch?ng. N?u c?n, thu?c có th? ???c kê toa. Các tri?u ch?ng th??ng c?i thi?n tabitha khi ?i?u tr?, nh?ng n?u ng?ng ?i?u tr?, các tri?u ch?ng th??ng tr? l?i tabitha m?t vài tháng. Vì th? ?a s? nh?ng ng??i b? b?nh GERD s? c?n ti?p t?c ?i?u tr?.  Các thay ??i v? l?i s?ng  · Gi?i h?n tránh ?n các th?c ph?m m? màng, chiên, và th?c ph?m gualberto, c?ng nh? cà phê, sô cô la, b?c hà, và th?c ph?m có yris?u ch?t axit nh? cà judith và trái cây và n??c trái cây có ch?t judith (cam, b??i, lali).  · Không ?n no, ??c bi?t là vào ban ?êm. ?n các b?a ?n nh? h?n, th??ng xuyên là t?t nh?t. Không n?m larson?ng ngay tabitha khi ?n. Và không ?n b?t c? th? gì greg 3 gi? tr??c khi ?i ng?.  · Tránh u?ng r??u và hút thu?c lá. Tránh vi?c hút thu?c lá gián ti?p, càng yris?u càng t?t.  · N?u quý v? quá n?ng cân, vi?c s?t cân s? gi?m b?t các tri?u ch?ng.   · Tránh m?c qu?n áo ch?t xung quanh vùng b?leoncio c?a clifton v?.  · N?u các  tri?u ch?ng c?a quý v? x?y ra greg khi ng?, hãy dùng m?t mi?ng ??m ?? nâng nugent ph?n trên c? th? c?a quý v? (không ch? cho ??u c?a quý v? mà thôi.) Ho?c, ??t các kh?i d?y 4 in s? bên d??i ??u gi??ng.  Thu?c  N?u c?n, thu?c có th? giúp làm gi?m các tri?u ch?ng c?a b?nh GERD và ng?n ng?a thi?t h?i cho th?c qu?n. Bàn v? m?t k? ho?ch dùng thu?c v?i nhân viên y t? c?a quý v?. ?i?u này có th? rose g?m m?t ho?c yris?u lo?i thu?c tabitha ?ây:  · Antacids (ch?ng axit) ?? giúp yonis hoà ch?t axit thông th??ng greg rose t? c?a quý v?.  · Ch?t ng?n ch?n axit (ch?t ng?n ch?n H2) ?? làm gi?m vi?c t?o ra ch?t axit.  · Ch?t ?c ch? axit (PPIs) ?? gi?m vi?c t?o ra axit francisco m?t cách khác h?n là các ch?t ng?n ch?n. Chúng có th? có tác d?ng t?t h?n, nh?ng có th? c?n h?i lâu h?n m?t chút ?? có tác d?ng.  Dùng m?t viên antacid (ch?ng axit) t? 30-60 phút tabitha khi ?n và vào lúc ?i ng?, nh?ng không cùng lúc v?i vi?c dùng ch?t ng?n ch?n axit.   C? g?ng không dùng các lo?i thu?c nh? ibuprofen và aspirin. N?u quý v? hi?n ?ang dùng aspirin cho silvia c?a mình ho?c các lý do y jude khác, hãy bàn v?i nhân viên y t? c?a quý v? v? vi?c ng?ng thu?c này l?i.  Ch?m sóc francisco dõi  Hãy francisco dõi v?i nhân viên y t? c?a quý v? ho?c francisco s? khuyên nh? nhân viên chúng tôi.  Khi nào ?i tìm s? khuyên nh? v? y t?  G?i nhân viên y t? c?a mình n?u b? b?t c? nh?ng ?i?u nào tabitha ?ây:  · Ch?ng ?au b?ng b? tr?m tr?ng h?n và matilde?n larson?ng ph?n b?ng d??i phía bên ph?i (vùng ru?t th?a)  · ?au ng?c m?i larson?t hi?n ho?c b? tr?m tr?ng h?n, ho?c ada ra t?i l?ng, c?, vai, ho?c cánh otis  · Ói m?a th??ng xuyên (không th? gi? cho ch?t l?ng kh?i trào ra ???c)  · Có máu greg phân ho?c ch?t ói (có màu ?? ho?c ?en)  · C?m th?y y?u ho?c chóng m?t  · S?t t? 100.4°F (38°C) tr? lên, ho?c rfancisco ch? d?n c?a nhân viên y t?  Date Last Reviewed: 6/23/2015  © 8181-1213 [employer]. [address] [place] [address], [place] [address]. All rights reserved. This information is not intended as a substitute  for professional medical care. Always follow your healthcare professional's instructions.

## 2020-01-15 LAB
ALBUMIN/CREAT UR: 45.3 UG/MG (ref 0–30)
CREAT UR-MCNC: 137 MG/DL (ref 23–375)
MICROALBUMIN UR DL<=1MG/L-MCNC: 62 UG/ML

## 2020-03-09 ENCOUNTER — TELEPHONE (OUTPATIENT)
Dept: INTERNAL MEDICINE | Facility: CLINIC | Age: 60
End: 2020-03-09

## 2020-03-09 DIAGNOSIS — E11.9 DIABETES MELLITUS TYPE 2 WITHOUT RETINOPATHY: Primary | ICD-10-CM

## 2020-03-09 RX ORDER — METFORMIN HYDROCHLORIDE 750 MG/1
TABLET, EXTENDED RELEASE ORAL
Qty: 180 TABLET | Refills: 4 | Status: SHIPPED | OUTPATIENT
Start: 2020-03-09 | End: 2021-05-11

## 2020-03-09 NOTE — TELEPHONE ENCOUNTER
Patient requesting his metformin 750 mg to send to   [facility]  [address] [place] [address]   Phone: [phone] Fax: [phone]     Last OV 01/14/20. Please advise.

## 2020-03-09 NOTE — TELEPHONE ENCOUNTER
----- Message from Mari Patterson sent at 3/9/2020  1:37 PM CDT -----  Contact: Self- [phone]  .Type:  RX Refill Request    Who Called: Robert Sinha    Refill or New Rx:Refill   RX Name and Strength:Metformin 750mg   How is the patient currently taking it? (ex. 1XDay):2xday   Is this a 30 day or 90 day RX:90day  Preferred Pharmacy with phone number:.  [facility] #[id number] - [place] [id number] [facility] & [address] [address]  Phone: [phone] Fax: [phone]      Local or Mail Order:Local   Ordering Provider:   Would the patient rather a call back or a response via [facility]? Call back   Best Call Back Number:.[phone] (home)   Additional Information:       Thank You,   Mari Patterson

## 2020-04-03 ENCOUNTER — TELEPHONE (OUTPATIENT)
Dept: INTERNAL MEDICINE | Facility: CLINIC | Age: 60
End: 2020-04-03

## 2020-04-03 NOTE — TELEPHONE ENCOUNTER
----- Message from Mari Patterson sent at 4/3/2020  1:28 PM CDT -----  Contact: Self- [phone]   .Type:  RX Refill Request    Who Called: Robert Van Gonzáles    Refill or New Rx:Refill   RX Name and Strength:rosuvastatin (CRESTOR) 40 MG Tab  How is the patient currently taking it? (ex. 1XDay):1XDAY   Is this a 30 day or 90 day RX:90day if possible   Preferred Pharmacy with phone number:.  [facility] "[facility] #[id number] [place]  [address] [facility] & [address] [address]  Phone: [phone] Fax: [phone]      Local or Mail Order:Local   Ordering Provider:   Would the patient rather a call back or a response via [facility]? Call back   Best Call Back Number:.[phone] (home)   Additional Information:     .Type:  RX Refill Request    Who Called: Robert Van Gonzáles    Refill or New Rx:Refill   RX Name and Strength:glimepiride (AMARYL) 2 MG tablet  How is the patient currently taking it? (ex. 1XDay):1xday   Is this a 30 day or 90 day RX:90day if possible   Preferred Pharmacy with phone number:[facility] #[id number] [place] [id number] [facility] & [address] [address]  Phone: [phone] Fax: [phone]      Local or Mail Order:Local   Ordering Provider:   Would the patient rather a call back or a response via [facility]? Call back   Best Call Back Number:.[phone] (home)   Additional Information:       Thank you for all you do .   Mari Patterson

## 2020-04-03 NOTE — TELEPHONE ENCOUNTER
----- Message from Diana Bonilla sent at 4/3/2020  2:15 PM CDT -----  Contact: Pt   Type:  Patient Returning Call    Who Called: Pt   Who Left Message for Patient: makayla  Does the patient know what this is regarding?: yes   Would the patient rather a call back or a response via [facility]? Call back   Best Call Back Number: [phone](cell)  Additional Information: n/a

## 2020-05-04 DIAGNOSIS — I10 HYPERTENSION, ESSENTIAL: ICD-10-CM

## 2020-05-04 RX ORDER — LISINOPRIL AND HYDROCHLOROTHIAZIDE 12.5; 2 MG/1; MG/1
1 TABLET ORAL DAILY
Qty: 90 TABLET | Refills: 0 | Status: SHIPPED | OUTPATIENT
Start: 2020-05-04 | End: 2020-08-04

## 2020-05-04 NOTE — TELEPHONE ENCOUNTER
----- Message from Oscar Meeks sent at 5/4/2020 10:20 AM CDT -----  Contact: pt   .Type:  RX Refill Request    Who Called:  Pt   Refill or New Rx: refill   RX Name and Strength: doesn't know name its for his high blood pressure   How is the patient currently taking it? (ex. 1XDay): 1 x day   Is this a 30 day or 90 day RX: 90 day   Preferred Pharmacy with phone number: [facility]   Local or Mail Order: local   Ordering Provider: rudi   Would the patient rather a call back or a response via [facility]? Callback   Best Call Back Number: [phone]   Additional Information:         .  [id number] - [place] [address] & [address] [place] [address]  Phone: [phone] Fax: [phone]

## 2020-05-18 ENCOUNTER — OFFICE VISIT (OUTPATIENT)
Dept: INTERNAL MEDICINE | Facility: CLINIC | Age: 60
End: 2020-05-18
Payer: COMMERCIAL

## 2020-05-18 ENCOUNTER — OFFICE VISIT (OUTPATIENT)
Dept: CARDIOLOGY | Facility: CLINIC | Age: 60
End: 2020-05-18
Payer: COMMERCIAL

## 2020-05-18 ENCOUNTER — PATIENT OUTREACH (OUTPATIENT)
Dept: ADMINISTRATIVE | Facility: OTHER | Age: 60
End: 2020-05-18

## 2020-05-18 ENCOUNTER — LAB VISIT (OUTPATIENT)
Dept: LAB | Facility: HOSPITAL | Age: 60
End: 2020-05-18
Attending: FAMILY MEDICINE
Payer: COMMERCIAL

## 2020-05-18 VITALS
WEIGHT: 161.63 LBS | HEIGHT: 66 IN | SYSTOLIC BLOOD PRESSURE: 134 MMHG | TEMPERATURE: 97 F | HEART RATE: 60 BPM | BODY MASS INDEX: 25.97 KG/M2 | OXYGEN SATURATION: 98 % | DIASTOLIC BLOOD PRESSURE: 64 MMHG

## 2020-05-18 VITALS
HEIGHT: 66 IN | OXYGEN SATURATION: 97 % | HEART RATE: 55 BPM | SYSTOLIC BLOOD PRESSURE: 118 MMHG | BODY MASS INDEX: 26.08 KG/M2 | WEIGHT: 162.25 LBS | DIASTOLIC BLOOD PRESSURE: 74 MMHG

## 2020-05-18 DIAGNOSIS — Z00.00 WELLNESS EXAMINATION: ICD-10-CM

## 2020-05-18 DIAGNOSIS — E78.2 MIXED HYPERLIPIDEMIA: Chronic | ICD-10-CM

## 2020-05-18 DIAGNOSIS — Z11.4 SCREENING FOR HIV WITHOUT PRESENCE OF RISK FACTORS: ICD-10-CM

## 2020-05-18 DIAGNOSIS — Z00.00 WELLNESS EXAMINATION: Primary | ICD-10-CM

## 2020-05-18 DIAGNOSIS — Z12.9 SCREENING FOR CANCER: ICD-10-CM

## 2020-05-18 DIAGNOSIS — E11.9 DIABETES MELLITUS TYPE 2 WITHOUT RETINOPATHY: ICD-10-CM

## 2020-05-18 DIAGNOSIS — E11.69 HYPERLIPIDEMIA ASSOCIATED WITH TYPE 2 DIABETES MELLITUS: ICD-10-CM

## 2020-05-18 DIAGNOSIS — E78.5 HYPERLIPIDEMIA ASSOCIATED WITH TYPE 2 DIABETES MELLITUS: ICD-10-CM

## 2020-05-18 DIAGNOSIS — F17.200 SMOKER: ICD-10-CM

## 2020-05-18 DIAGNOSIS — I10 ESSENTIAL HYPERTENSION: Primary | Chronic | ICD-10-CM

## 2020-05-18 DIAGNOSIS — Z82.49 FAMILY HISTORY OF HEART DISEASE IN MALE FAMILY MEMBER BEFORE AGE 55: ICD-10-CM

## 2020-05-18 DIAGNOSIS — I10 HYPERTENSION, ESSENTIAL: ICD-10-CM

## 2020-05-18 DIAGNOSIS — I51.7 LVH (LEFT VENTRICULAR HYPERTROPHY): ICD-10-CM

## 2020-05-18 DIAGNOSIS — K21.9 GASTROESOPHAGEAL REFLUX DISEASE, ESOPHAGITIS PRESENCE NOT SPECIFIED: ICD-10-CM

## 2020-05-18 DIAGNOSIS — Z72.0 TOBACCO USE: ICD-10-CM

## 2020-05-18 LAB
CHOLEST SERPL-MCNC: 160 MG/DL (ref 120–199)
CHOLEST/HDLC SERPL: 4.4 {RATIO} (ref 2–5)
ESTIMATED AVG GLUCOSE: 174 MG/DL (ref 68–131)
HBA1C MFR BLD HPLC: 7.7 % (ref 4–5.6)
HDLC SERPL-MCNC: 36 MG/DL (ref 40–75)
HDLC SERPL: 22.5 % (ref 20–50)
LDLC SERPL CALC-MCNC: 105.8 MG/DL (ref 63–159)
NONHDLC SERPL-MCNC: 124 MG/DL
TRIGL SERPL-MCNC: 91 MG/DL (ref 30–150)

## 2020-05-18 PROCEDURE — 99999 PR PBB SHADOW E&M-EST. PATIENT-LVL IV: ICD-10-PCS | Mod: PBBFAC,,, | Performed by: FAMILY MEDICINE

## 2020-05-18 PROCEDURE — 3074F SYST BP LT 130 MM HG: CPT | Mod: CPTII,S$GLB,, | Performed by: INTERNAL MEDICINE

## 2020-05-18 PROCEDURE — 36415 COLL VENOUS BLD VENIPUNCTURE: CPT

## 2020-05-18 PROCEDURE — 86703 HIV-1/HIV-2 1 RESULT ANTBDY: CPT

## 2020-05-18 PROCEDURE — 99214 OFFICE O/P EST MOD 30 MIN: CPT | Mod: S$GLB,,, | Performed by: INTERNAL MEDICINE

## 2020-05-18 PROCEDURE — 3075F PR MOST RECENT SYSTOLIC BLOOD PRESS GE 130-139MM HG: ICD-10-PCS | Mod: CPTII,S$GLB,, | Performed by: FAMILY MEDICINE

## 2020-05-18 PROCEDURE — 99396 PR PREVENTIVE VISIT,EST,40-64: ICD-10-PCS | Mod: S$GLB,,, | Performed by: FAMILY MEDICINE

## 2020-05-18 PROCEDURE — 3008F PR BODY MASS INDEX (BMI) DOCUMENTED: ICD-10-PCS | Mod: CPTII,S$GLB,, | Performed by: INTERNAL MEDICINE

## 2020-05-18 PROCEDURE — 3078F DIAST BP <80 MM HG: CPT | Mod: CPTII,S$GLB,, | Performed by: INTERNAL MEDICINE

## 2020-05-18 PROCEDURE — 83036 HEMOGLOBIN GLYCOSYLATED A1C: CPT

## 2020-05-18 PROCEDURE — 3008F BODY MASS INDEX DOCD: CPT | Mod: CPTII,S$GLB,, | Performed by: INTERNAL MEDICINE

## 2020-05-18 PROCEDURE — 3078F DIAST BP <80 MM HG: CPT | Mod: CPTII,S$GLB,, | Performed by: FAMILY MEDICINE

## 2020-05-18 PROCEDURE — 3051F HG A1C>EQUAL 7.0%<8.0%: CPT | Mod: CPTII,S$GLB,, | Performed by: FAMILY MEDICINE

## 2020-05-18 PROCEDURE — 3075F SYST BP GE 130 - 139MM HG: CPT | Mod: CPTII,S$GLB,, | Performed by: FAMILY MEDICINE

## 2020-05-18 PROCEDURE — 99999 PR PBB SHADOW E&M-EST. PATIENT-LVL III: ICD-10-PCS | Mod: PBBFAC,,, | Performed by: INTERNAL MEDICINE

## 2020-05-18 PROCEDURE — 80061 LIPID PANEL: CPT

## 2020-05-18 PROCEDURE — 3078F PR MOST RECENT DIASTOLIC BLOOD PRESSURE < 80 MM HG: ICD-10-PCS | Mod: CPTII,S$GLB,, | Performed by: INTERNAL MEDICINE

## 2020-05-18 PROCEDURE — 99396 PREV VISIT EST AGE 40-64: CPT | Mod: S$GLB,,, | Performed by: FAMILY MEDICINE

## 2020-05-18 PROCEDURE — 99214 PR OFFICE/OUTPT VISIT, EST, LEVL IV, 30-39 MIN: ICD-10-PCS | Mod: S$GLB,,, | Performed by: INTERNAL MEDICINE

## 2020-05-18 PROCEDURE — 3051F PR MOST RECENT HEMOGLOBIN A1C LEVEL 7.0 - < 8.0%: ICD-10-PCS | Mod: CPTII,S$GLB,, | Performed by: INTERNAL MEDICINE

## 2020-05-18 PROCEDURE — 3051F PR MOST RECENT HEMOGLOBIN A1C LEVEL 7.0 - < 8.0%: ICD-10-PCS | Mod: CPTII,S$GLB,, | Performed by: FAMILY MEDICINE

## 2020-05-18 PROCEDURE — 3074F PR MOST RECENT SYSTOLIC BLOOD PRESSURE < 130 MM HG: ICD-10-PCS | Mod: CPTII,S$GLB,, | Performed by: INTERNAL MEDICINE

## 2020-05-18 PROCEDURE — 3051F HG A1C>EQUAL 7.0%<8.0%: CPT | Mod: CPTII,S$GLB,, | Performed by: INTERNAL MEDICINE

## 2020-05-18 PROCEDURE — 99999 PR PBB SHADOW E&M-EST. PATIENT-LVL III: CPT | Mod: PBBFAC,,, | Performed by: INTERNAL MEDICINE

## 2020-05-18 PROCEDURE — 99999 PR PBB SHADOW E&M-EST. PATIENT-LVL IV: CPT | Mod: PBBFAC,,, | Performed by: FAMILY MEDICINE

## 2020-05-18 PROCEDURE — 3078F PR MOST RECENT DIASTOLIC BLOOD PRESSURE < 80 MM HG: ICD-10-PCS | Mod: CPTII,S$GLB,, | Performed by: FAMILY MEDICINE

## 2020-05-18 NOTE — Clinical Note
sched opto - see [facility] labs for today - A1C, lipids and HIVsched Dr Falk - cards - for annual rechecksched LDCT lung pls

## 2020-05-18 NOTE — PROGRESS NOTES
Subjective:   Patient ID:  Robert Sinha is a 60 y.o. male who presents for follow up of Annual Exam      59 yo male, 1yr  PMH DM > 20 yrs, HTN, HLD, gerd and current smoking. No h/o heart attack, stroke and cancer. F/H premature CAD.  Occasional heart burn with spicy food or when he was hungry  No chest pain, palpitation dizziness and faint  Mild DENNY  Smoking 1/2 ppd for 40 yrs. Drinking beers.  Mother DM and heart attack at age of 52  1-2019 Exercise stress echo METs 13, normal EF and no stress induced ischemia. [id number] g/m2   chest CT showed diffuse corornary calcification        Past Medical History:   Diagnosis Date    Diabetes mellitus, type 2     Hyperlipidemia     Hypertension        Past Surgical History:   Procedure Laterality Date    COLONOSCOPY N/A 12/13/2016    Procedure: COLONOSCOPY;  Surgeon: Jethro Grande MD;  Location: [facility];  Service: Endoscopy;  Laterality: N/A;    COLONOSCOPY W/ POLYPECTOMY  12/13/2016    polyp x 1/ Dr Grande; repeat 5 yrs (adenomatous tissue)       Social History     Tobacco Use    Smoking status: Current Every Day Smoker     Packs/day: 0.50     Types: Cigarettes    Smokeless tobacco: Never Used   Substance Use Topics    Alcohol use: Yes     Alcohol/week: 2.5 standard drinks     Types: 3 Standard drinks or equivalent per week    Drug use: No       Family History   Problem Relation Age of Onset    Diabetes Mother     Heart disease Mother     Diabetes Father     Heart disease Sister     Heart disease Brother     Heart disease Sister     Strabismus Neg Hx     Retinal detachment Neg Hx     Macular degeneration Neg Hx     Glaucoma Neg Hx     Blindness Neg Hx     Amblyopia Neg Hx          Review of Systems   Constitution: Negative for decreased appetite, diaphoresis, fever, malaise/fatigue and night sweats.   HENT: Negative for nosebleeds.    Eyes: Negative for blurred vision and double vision.   Cardiovascular: Positive for chest pain. Negative  for claudication, dyspnea on exertion, irregular heartbeat, leg swelling, near-syncope, orthopnea, palpitations, paroxysmal nocturnal dyspnea and syncope.   Respiratory: Negative for cough, shortness of breath, sleep disturbances due to breathing, snoring, sputum production and wheezing.    Endocrine: Negative for cold intolerance and polyuria.   Hematologic/Lymphatic: Does not bruise/bleed easily.   Skin: Negative for rash.   Musculoskeletal: Negative for back pain, falls, joint pain, joint swelling and neck pain.   Gastrointestinal: Negative for abdominal pain, heartburn, nausea and vomiting.   Genitourinary: Negative for dysuria, frequency and hematuria.   Neurological: Negative for difficulty with concentration, dizziness, focal weakness, headaches, light-headedness, numbness, seizures and weakness.   Psychiatric/Behavioral: Negative for depression, memory loss and substance abuse. The patient does not have insomnia.    Allergic/Immunologic: Negative for HIV exposure and hives.       Objective:   Physical Exam   Constitutional: He is oriented to person, place, and time. He appears well-nourished.   HENT:   Head: Normocephalic.   Eyes: Pupils are equal, round, and reactive to light.   Neck: Normal carotid pulses and no JVD present. Carotid bruit is not present. No thyromegaly present.   Cardiovascular: Normal rate, regular rhythm, normal heart sounds and normal pulses.  No extrasystoles are present. PMI is not displaced. Exam reveals no gallop and no S3.   No murmur heard.  Pulmonary/Chest: Breath sounds normal. No stridor. No respiratory distress.   Abdominal: Soft. Bowel sounds are normal. There is no tenderness. There is no rebound.   Musculoskeletal: Normal range of motion.   Neurological: He is alert and oriented to person, place, and time.   Skin: Skin is intact. No rash noted.   Psychiatric: His behavior is normal.       Lab Results   Component Value Date    CHOL 193 01/09/2020    CHOL 182 02/26/2019     CHOL 189 10/22/2018     Lab Results   Component Value Date    HDL 41 01/09/2020    HDL 31 (L) 02/26/2019    HDL 40 10/22/2018     Lab Results   Component Value Date    LDLCALC 120.4 01/09/2020    LDLCALC 105.4 02/26/2019    LDLCALC 125.0 10/22/2018     Lab Results   Component Value Date    TRIG 158 (H) 01/09/2020    TRIG 228 (H) 02/26/2019    TRIG 120 10/22/2018     Lab Results   Component Value Date    CHOLHDL 21.2 01/09/2020    CHOLHDL 17.0 (L) 02/26/2019    CHOLHDL 21.2 10/22/2018       Chemistry        Component Value Date/Time     (L) 01/09/2020 0816    K 4.0 01/09/2020 0816     01/09/2020 0816    CO2 24 01/09/2020 0816    BUN 21 (H) 01/09/2020 0816    CREATININE 1.0 01/09/2020 0816     (H) 01/09/2020 0816        Component Value Date/Time    CALCIUM 9.5 01/09/2020 0816    ALKPHOS 48 (L) 01/09/2020 0816    AST 21 01/09/2020 0816    ALT 25 01/09/2020 0816    BILITOT 0.4 01/09/2020 0816    ESTGFRAFRICA >60.0 01/09/2020 0816    EGFRNONAA >60.0 01/09/2020 0816          Lab Results   Component Value Date    HGBA1C 7.5 (H) 01/09/2020     No results found for: TSH  No results found for: INR, PROTIME  Lab Results   Component Value Date    WBC 8.81 07/11/2019    HGB 14.5 07/11/2019    HCT 43.2 07/11/2019    MCV 95 07/11/2019     07/11/2019     BMP  Sodium   Date Value Ref Range Status   01/09/2020 134 (L) 136 - 145 mmol/L Final     Potassium   Date Value Ref Range Status   01/09/2020 4.0 3.5 - 5.1 mmol/L Final     Chloride   Date Value Ref Range Status   01/09/2020 102 95 - 110 mmol/L Final     CO2   Date Value Ref Range Status   01/09/2020 24 23 - 29 mmol/L Final     BUN, Bld   Date Value Ref Range Status   01/09/2020 21 (H) 6 - 20 mg/dL Final     Creatinine   Date Value Ref Range Status   01/09/2020 1.0 0.5 - 1.4 mg/dL Final     Calcium   Date Value Ref Range Status   01/09/2020 9.5 8.7 - 10.5 mg/dL Final     Anion Gap   Date Value Ref Range Status   01/09/2020 8 8 - 16 mmol/L Final     eGFR  if    Date Value Ref Range Status   01/09/2020 >60.0 >60 mL/min/1.73 m^2 Final     eGFR if non    Date Value Ref Range Status   01/09/2020 >60.0 >60 mL/min/1.73 m^2 Final     Comment:     Calculation used to obtain the estimated glomerular filtration  rate (eGFR) is the CKD-EPI equation.        BNP  @LABRCNTIP(BNP,BNPTRIAGEBLO)@  @LABRCNTIP(troponini)@  CrCl cannot be calculated (Patient's most recent lab result is older than the maximum 7 days allowed.).  No results found in the last 24 hours.  No results found in the last 24 hours.  No results found in the last 24 hours.    Assessment:      1. Essential hypertension    2. Mixed hyperlipidemia    3. Diabetes mellitus type 2 without retinopathy    4. Smoker    5. Family history of heart disease in male family member before age 55    6. LVH (left ventricular hypertrophy)    7. Tobacco use        Plan:   Calcium score ordered  continue crestor and prinzide  Counseled DASH  Recommend heart-healthy diet, weight control and regular exercise.  John. Risk modification.   Smoking cessation  DM Rx for PCP  RTC in 1 yr    I have reviewed all pertinent labs and cardiac studies. Plans and recommendations have been formulated under my direct supervision. All questions answered and patient voiced understanding. Patient to continue current medications.

## 2020-05-18 NOTE — PROGRESS NOTES
Subjective:       Patient ID: Robert Sinha is a 60 y.o. male.    Chief Complaint: Annual Exam    ANNUAL EXAM:  Preventative Health Checklist 24-64 years of age    Robert Gonzáles presents today for an annual exam.  He has type 2 diabetes, uncontrolled hypertension.  Eye Exam due on 10/29/2019  Foot Exam due on 07/16/2020    Lab Results       Component                Value               Date                       WBC                      8.81                07/11/2019                 HGB                      14.5                07/11/2019                 HCT                      43.2                07/11/2019                 PLT                      229                 07/11/2019                 CHOL                     193                 01/09/2020                 TRIG                     158 (H)             01/09/2020                 HDL                      41                  01/09/2020                 LDLCALC                  120.4               01/09/2020                 ALT                      25                  01/09/2020                 AST                      21                  01/09/2020                 NA                       134 (L)             01/09/2020                 K                        4.0                 01/09/2020                 CL                       102                 01/09/2020                 CALCIUM                  9.5                 01/09/2020                 CREATININE               1.0                 01/09/2020                 BUN                      21 (H)              01/09/2020                 CO2                      24                  01/09/2020                 PSA                      0.25                07/16/2019                 GLU                      185 (H)             01/09/2020                 ESTGFRAFRICA             >60.0               01/09/2020                 EGFRNONAA                >60.0               01/09/2020                 HGBA1C                    7.5 (H)             01/09/2020                 MICALBCREAT              45.3 (H)            01/14/2020            At his last visit in January he chose to work on diet/exercise rather than increase his glimepiride dose.  Labs have been ordered but not obtained.  Will obtain today.  Diabetes Medications        glimepiride (AMARYL) 2 MG tablet TAKE 1 TABLET(2 MG) BY MOUTH BEFORE BREAKFAST    metFORMIN (GLUCOPHAGE-XR) 750 MG XR 24hr tablet TAKE 1 TABLET(750 MG) BY MOUTH TWICE DAILY   Note mild albuminuria as of Jan 2020.  Lipids not controlled - on crestor 40 daily.    Health Maintenance Summary                HIV Screening Overdue 4/5/1976     Shingles Vaccine Overdue 4/5/2011     Eye Exam Overdue 10/29/2019      Done 10/29/2018      Done 10/29/2018 LOS:[id number] [place] EYE EXAM, EST PATIENT,COMPREHESV     Done 10/13/2016 LOS:[id number] [place] EYE EXAM, EST PATIENT,COMPREHESV     Done 10/13/2016 DR SHELLY GARCIA. No Diabetic Retinopathy     Done 10/13/2016      Patient has more history with this topic...    Foot Exam Next Due 7/16/2020      Done 7/16/2019      Done 7/16/2019 [employer]: WORKFLOW - DIABETES - DIABETIC FOOT EXAM   PERFORMED     Done 10/26/2018 [employer]: WORKFLOW - DIABETES - DIABETIC FOOT EXAM   PERFORMED     Done 12/26/2017      Done 12/26/2017 [employer]: WORKFLOW - DIABETES - DIABETIC FOOT EXAM   PERFORMED     Patient has more history with this topic...    Hemoglobin A1c Next Due 7/9/2020      Done 1/9/2020 HEMOGLOBIN A1C Hemoglobin A1C External      Patient has more history with this topic...    PROSTATE-SPECIFIC ANTIGEN Next Due 7/16/2020      Done 7/16/2019 PSA, SCREENING     Done 5/16/2014 PSA, SCREENING    Lipid Panel Next Due 1/9/2021      Done 1/9/2020 LIPID PANEL     Done 2/26/2019 LIPID PANEL     Done 10/22/2018 LIPID PANEL     Done 6/11/2018 LIPID PANEL     Done 12/4/2017 LIPID PANEL     Patient has more history with this topic...    Urine Microalbumin Next Due 1/14/2021      Done 1/14/2020  MICROALBUMIN / CREATININE RATIO URINE MICROALB/CREAT   RATIO      Patient has more history with this topic...    Low Dose Statin Next Due 2/2/2021      Done 2/2/2020 Registry Metric: Last Current Statin Reviewed Date     Done 1/7/2020 Registry Metric: Last Current Statin Order Date    Colonoscopy Next Due 12/13/2021      Done 12/13/2016 COLONOSCOPY    TETANUS VACCINE Next Due 1/15/2026      Done 1/15/2016 Imm Admin: Tdap    Hepatitis C Screening This plan is no longer active.      Done 12/10/2014 HEPATITIS C ANTIBODY    Pneumococcal Vaccine (Medium Risk) This plan is no longer active.      Done 12/10/2014 Imm Admin: Pneumococcal Polysaccharide - 23 Valent    Influenza Vaccine This plan is no longer active.      Done 10/11/2019 Imm Admin: Influenza - Quadrivalent - PF (6 months and   older)     Done 10/19/2018 Imm Admin: Influenza - Quadrivalent - PF (6 months and   older)     Done 10/19/2018 at work     Done 10/4/2017 Imm Admin: Influenza - Quadrivalent - PF (6 months and   older)     Done 10/4/2017 outside pharmacy record     Patient has more history with this topic...        Counseling:  Nutrition: Encouraged to take 5-10 servings fruits and vegetables daily   Exercise:  Physical activity recommendations reviewed   Avoid Tobacco Use: reviewed -6-7 cigarettes daily - has gained weight when quit in past - longest 4 months  Avoid ETOH/Drug Use:  reviewed  STD Prevention/Abstinence:   Avoid High Risk Behavior:   Unintended Pregnancy:    Lap-shoulder Belts:  Yes  No text/talk while driving: Reviewed  Bike/ATV Motorcycle Helmets:  N/A  Smoke Detector:  yes  Safe Storage/Removal of Firearms: reviewed    Regular Dental Visits:  yes  Floss, Brush and Fluoride: yes    He has completed a full 14 system review. All items are negative except as indicated.    Review of Systems   Constitutional: Negative.    HENT: Negative.    Eyes: Negative.    Respiratory: Negative.    Cardiovascular: Negative.    Gastrointestinal: Negative.          Very rare use of PEPCID   Endocrine: Negative.    Genitourinary: Negative.    Musculoskeletal: Negative.    Skin: Negative.         Itching to dorsum both hands - sun exposure - daily fishing while on lockdown. Sun exposure. Resolves with moisturizer.   Allergic/Immunologic: Negative.    Neurological: Positive for dizziness (off balance on standing from crouching ).   Hematological: Negative.    Psychiatric/Behavioral: Negative.        Objective:      Physical Exam   Constitutional: He is oriented to person, place, and time. He appears well-developed and well-nourished.   HENT:   Head: Normocephalic and atraumatic.   Right Ear: Tympanic membrane, external ear and ear canal normal.   Left Ear: Tympanic membrane, external ear and ear canal normal.   Nose: Nose normal.   Mouth/Throat: Oropharynx is clear and moist.   Eyes: Conjunctivae and EOM are normal.   Neck: Normal range of motion. Neck supple. No thyromegaly present.   Cardiovascular: Normal rate, regular rhythm and normal heart sounds.   Pulses:       Dorsalis pedis pulses are 2+ on the right side, and 2+ on the left side.        Posterior tibial pulses are 1+ on the right side, and 1+ on the left side.   Pulmonary/Chest: Effort normal and breath sounds normal. No respiratory distress.   Abdominal: Soft. He exhibits no distension. There is no tenderness.   Musculoskeletal: Normal range of motion. He exhibits no edema.        Right foot: There is normal range of motion and no deformity.        Left foot: There is normal range of motion and no deformity.   Feet:   Right Foot:   Protective Sensation: 10 sites tested. 10 sites sensed.   Skin Integrity: Negative for ulcer or skin breakdown.   Left Foot:   Protective Sensation: 10 sites tested. 10 sites sensed.   Skin Integrity: Negative for ulcer or skin breakdown.   Lymphadenopathy:     He has no cervical adenopathy.   Neurological: He is alert and oriented to person, place, and time.   Skin: Skin is warm and  dry.   Psychiatric: He has a normal mood and affect. His behavior is normal.         Assessment/Plan:     1. Wellness examination  HIV 1/2 Ag/Ab (4th Gen)    CT Chest Lung Screening Low Dose   2. Diabetes mellitus type 2 without retinopathy     3. Hypertension, essential     4. Hyperlipidemia associated with type 2 diabetes mellitus  Lipid Panel   5. Gastroesophageal reflux disease, esophagitis presence not specified     6. Smoker  CT Chest Lung Screening Low Dose   7. Screening for HIV without presence of risk factors  HIV 1/2 Ag/Ab (4th Gen)   8. Screening for cancer  CT Chest Lung Screening Low Dose   needs his DM labs and foot exam today.  Add HIV.  Eye exam overdue. Will sched with Dr Chau.  Reviewed shingles vaccine recs.  He wants recheck LDCT scan - also due for annual recheck with Dr Falk - statin not contrtolling lipids.

## 2020-05-18 NOTE — PATIENT INSTRUCTIONS
You may obtain shingles vaccine (two dose series) at a pharmacy. Talk to pharmacy about copay cost.

## 2020-05-19 ENCOUNTER — TELEPHONE (OUTPATIENT)
Dept: INTERNAL MEDICINE | Facility: CLINIC | Age: 60
End: 2020-05-19

## 2020-05-19 DIAGNOSIS — E78.5 HYPERLIPIDEMIA ASSOCIATED WITH TYPE 2 DIABETES MELLITUS: ICD-10-CM

## 2020-05-19 DIAGNOSIS — I10 ESSENTIAL HYPERTENSION: Primary | ICD-10-CM

## 2020-05-19 DIAGNOSIS — E11.9 DIABETES MELLITUS TYPE 2 WITHOUT RETINOPATHY: ICD-10-CM

## 2020-05-19 DIAGNOSIS — E11.69 HYPERLIPIDEMIA ASSOCIATED WITH TYPE 2 DIABETES MELLITUS: ICD-10-CM

## 2020-05-19 LAB — HIV 1+2 AB+HIV1 P24 AG SERPL QL IA: NEGATIVE

## 2020-05-25 ENCOUNTER — TELEPHONE (OUTPATIENT)
Dept: RADIOLOGY | Facility: HOSPITAL | Age: 60
End: 2020-05-25

## 2020-05-26 ENCOUNTER — HOSPITAL ENCOUNTER (OUTPATIENT)
Dept: RADIOLOGY | Facility: HOSPITAL | Age: 60
Discharge: HOME | End: 2020-05-26
Attending: FAMILY MEDICINE
Payer: COMMERCIAL

## 2020-05-26 DIAGNOSIS — Z12.9 SCREENING FOR CANCER: ICD-10-CM

## 2020-05-26 DIAGNOSIS — F17.200 SMOKER: ICD-10-CM

## 2020-05-26 DIAGNOSIS — Z00.00 WELLNESS EXAMINATION: ICD-10-CM

## 2020-05-26 PROCEDURE — G0297 LDCT FOR LUNG CA SCREEN: HCPCS | Mod: TC

## 2020-05-26 PROCEDURE — G0297 LDCT FOR LUNG CA SCREEN: HCPCS | Mod: 26,,, | Performed by: RADIOLOGY

## 2020-05-26 PROCEDURE — G0297 CT CHEST LUNG SCREENING LOW DOSE: ICD-10-PCS | Mod: 26,,, | Performed by: RADIOLOGY

## 2020-05-27 ENCOUNTER — OFFICE VISIT (OUTPATIENT)
Dept: OPHTHALMOLOGY | Facility: CLINIC | Age: 60
End: 2020-05-27
Payer: COMMERCIAL

## 2020-05-27 DIAGNOSIS — E11.9 TYPE 2 DIABETES MELLITUS WITHOUT RETINOPATHY: Primary | ICD-10-CM

## 2020-05-27 DIAGNOSIS — H52.03 HYPEROPIA WITH PRESBYOPIA, BILATERAL: ICD-10-CM

## 2020-05-27 DIAGNOSIS — H52.4 HYPEROPIA WITH PRESBYOPIA, BILATERAL: ICD-10-CM

## 2020-05-27 PROCEDURE — 92014 PR EYE EXAM, EST PATIENT,COMPREHESV: ICD-10-PCS | Mod: S$GLB,,, | Performed by: OPTOMETRIST

## 2020-05-27 PROCEDURE — 92015 PR REFRACTION: ICD-10-PCS | Mod: S$GLB,,, | Performed by: OPTOMETRIST

## 2020-05-27 PROCEDURE — 92015 DETERMINE REFRACTIVE STATE: CPT | Mod: S$GLB,,, | Performed by: OPTOMETRIST

## 2020-05-27 PROCEDURE — 99999 PR PBB SHADOW E&M-EST. PATIENT-LVL I: CPT | Mod: PBBFAC,,, | Performed by: OPTOMETRIST

## 2020-05-27 PROCEDURE — 92014 COMPRE OPH EXAM EST PT 1/>: CPT | Mod: S$GLB,,, | Performed by: OPTOMETRIST

## 2020-05-27 PROCEDURE — 99999 PR PBB SHADOW E&M-EST. PATIENT-LVL I: ICD-10-PCS | Mod: PBBFAC,,, | Performed by: OPTOMETRIST

## 2020-05-27 NOTE — PROGRESS NOTES
HPI     NIDDM exam.  No visual complaints.  Last eye exam 10/29/2018 [facility].  New patient to [facility].  Lab Results       Component                Value               Date                       HGBA1C                   7.7 (H)             05/18/2020              Last edited by Shay Chau, OD on 5/27/2020  8:23 AM. (History)            Assessment /Plan     For exam results, see Encounter Report.    Type 2 diabetes mellitus without retinopathy    Hyperopia with presbyopia, bilateral      No Background Diabetic Retinopathy    Dispense Final Rx for glasses.  RTC 1 year  Discussed above and answered questions.

## 2020-05-27 NOTE — LETTER
May 27, 2020    Robert Sinha  [address] [address]             [facility]  Ophthalmology  [address] [place] [id number]  Phone: [phone]  Fax: [phone]   May 27, 2020     Patient: Robert Sinha   YOB: 1960   Date of Visit: 5/27/2020       To Whom it May Concern:    Robert Gonzáles was seen in my clinic on 5/26/2020 and 5/27/2020. He may return to work on 5/27/2020.    Please excuse him from any classes or work missed.    If you have any questions or concerns, please don't hesitate to call.    Sincerely,         Shay Chau OD

## 2020-06-10 DIAGNOSIS — E11.9 DIABETES MELLITUS TYPE 2 WITHOUT RETINOPATHY: ICD-10-CM

## 2020-06-10 NOTE — TELEPHONE ENCOUNTER
----- Message from Britney Donaldson sent at 6/10/2020 11:44 AM CDT -----  Contact: pt  Patient needs to have refill on Metformin 750 mg twice a day. 90 day  Supply pharmacy [facility] [place] [facility] [place]. Patient call back [phone]

## 2020-06-10 NOTE — TELEPHONE ENCOUNTER
Spoke with [facility] pharmacy and they stated that patient's Metformin was filled on 03/10/2020 and he had 180 pills. He takes 60 per month and it is time for medication to be refilled. Patient stated that he only has 2 pills left.    Last office visit- 05/18/2020

## 2020-06-11 RX ORDER — METFORMIN HYDROCHLORIDE 750 MG/1
TABLET, EXTENDED RELEASE ORAL
Qty: 180 TABLET | Refills: 4 | OUTPATIENT
Start: 2020-06-11

## 2020-06-12 NOTE — TELEPHONE ENCOUNTER
Patient has informed rx refill request was denied due to early request and has still enough refilled at the local pharmacy.. Patient verbally understood.

## 2020-06-12 NOTE — TELEPHONE ENCOUNTER
RF sent to  3/9/2020 was for #180 with 4 refills. They need to look at the original rx and place the refills back on that rx if they did not read it correctly at the time.

## 2020-09-18 ENCOUNTER — LAB VISIT (OUTPATIENT)
Dept: LAB | Facility: HOSPITAL | Age: 60
End: 2020-09-18
Payer: COMMERCIAL

## 2020-09-18 ENCOUNTER — OFFICE VISIT (OUTPATIENT)
Dept: INTERNAL MEDICINE | Facility: CLINIC | Age: 60
End: 2020-09-18
Payer: COMMERCIAL

## 2020-09-18 VITALS
WEIGHT: 161.63 LBS | HEART RATE: 66 BPM | TEMPERATURE: 96 F | BODY MASS INDEX: 25.97 KG/M2 | OXYGEN SATURATION: 95 % | HEIGHT: 66 IN | SYSTOLIC BLOOD PRESSURE: 122 MMHG | DIASTOLIC BLOOD PRESSURE: 70 MMHG

## 2020-09-18 DIAGNOSIS — E11.69 HYPERLIPIDEMIA ASSOCIATED WITH TYPE 2 DIABETES MELLITUS: ICD-10-CM

## 2020-09-18 DIAGNOSIS — Z12.5 SCREENING FOR PROSTATE CANCER: ICD-10-CM

## 2020-09-18 DIAGNOSIS — E11.9 DIABETES MELLITUS TYPE 2 WITHOUT RETINOPATHY: ICD-10-CM

## 2020-09-18 DIAGNOSIS — E78.5 HYPERLIPIDEMIA ASSOCIATED WITH TYPE 2 DIABETES MELLITUS: ICD-10-CM

## 2020-09-18 DIAGNOSIS — K21.9 GASTROESOPHAGEAL REFLUX DISEASE, ESOPHAGITIS PRESENCE NOT SPECIFIED: ICD-10-CM

## 2020-09-18 DIAGNOSIS — I10 ESSENTIAL HYPERTENSION: ICD-10-CM

## 2020-09-18 DIAGNOSIS — E11.9 DIABETES MELLITUS TYPE 2 WITHOUT RETINOPATHY: Primary | ICD-10-CM

## 2020-09-18 LAB
ALBUMIN SERPL BCP-MCNC: 4.2 G/DL (ref 3.5–5.2)
ALP SERPL-CCNC: 55 U/L (ref 55–135)
ALT SERPL W/O P-5'-P-CCNC: 23 U/L (ref 10–44)
ANION GAP SERPL CALC-SCNC: 9 MMOL/L (ref 8–16)
AST SERPL-CCNC: 17 U/L (ref 10–40)
BILIRUB SERPL-MCNC: 0.4 MG/DL (ref 0.1–1)
BUN SERPL-MCNC: 19 MG/DL (ref 6–20)
CALCIUM SERPL-MCNC: 9.5 MG/DL (ref 8.7–10.5)
CHLORIDE SERPL-SCNC: 100 MMOL/L (ref 95–110)
CHOLEST SERPL-MCNC: 155 MG/DL (ref 120–199)
CHOLEST/HDLC SERPL: 4.3 {RATIO} (ref 2–5)
CO2 SERPL-SCNC: 28 MMOL/L (ref 23–29)
COMPLEXED PSA SERPL-MCNC: 0.3 NG/ML (ref 0–4)
CREAT SERPL-MCNC: 1 MG/DL (ref 0.5–1.4)
EST. GFR  (AFRICAN AMERICAN): >60 ML/MIN/1.73 M^2
EST. GFR  (NON AFRICAN AMERICAN): >60 ML/MIN/1.73 M^2
GLUCOSE SERPL-MCNC: 210 MG/DL (ref 70–110)
HDLC SERPL-MCNC: 36 MG/DL (ref 40–75)
HDLC SERPL: 23.2 % (ref 20–50)
LDLC SERPL CALC-MCNC: 88.2 MG/DL (ref 63–159)
NONHDLC SERPL-MCNC: 119 MG/DL
POTASSIUM SERPL-SCNC: 4.3 MMOL/L (ref 3.5–5.1)
PROT SERPL-MCNC: 7.3 G/DL (ref 6–8.4)
SODIUM SERPL-SCNC: 137 MMOL/L (ref 136–145)
TRIGL SERPL-MCNC: 154 MG/DL (ref 30–150)

## 2020-09-18 PROCEDURE — 99214 PR OFFICE/OUTPT VISIT, EST, LEVL IV, 30-39 MIN: ICD-10-PCS | Mod: S$GLB,,, | Performed by: FAMILY MEDICINE

## 2020-09-18 PROCEDURE — 80061 LIPID PANEL: CPT

## 2020-09-18 PROCEDURE — 3008F BODY MASS INDEX DOCD: CPT | Mod: CPTII,S$GLB,, | Performed by: FAMILY MEDICINE

## 2020-09-18 PROCEDURE — 3078F DIAST BP <80 MM HG: CPT | Mod: CPTII,S$GLB,, | Performed by: FAMILY MEDICINE

## 2020-09-18 PROCEDURE — 3074F PR MOST RECENT SYSTOLIC BLOOD PRESSURE < 130 MM HG: ICD-10-PCS | Mod: CPTII,S$GLB,, | Performed by: FAMILY MEDICINE

## 2020-09-18 PROCEDURE — 83036 HEMOGLOBIN GLYCOSYLATED A1C: CPT

## 2020-09-18 PROCEDURE — 99214 OFFICE O/P EST MOD 30 MIN: CPT | Mod: S$GLB,,, | Performed by: FAMILY MEDICINE

## 2020-09-18 PROCEDURE — 3074F SYST BP LT 130 MM HG: CPT | Mod: CPTII,S$GLB,, | Performed by: FAMILY MEDICINE

## 2020-09-18 PROCEDURE — 3051F PR MOST RECENT HEMOGLOBIN A1C LEVEL 7.0 - < 8.0%: ICD-10-PCS | Mod: CPTII,S$GLB,, | Performed by: FAMILY MEDICINE

## 2020-09-18 PROCEDURE — 3051F HG A1C>EQUAL 7.0%<8.0%: CPT | Mod: CPTII,S$GLB,, | Performed by: FAMILY MEDICINE

## 2020-09-18 PROCEDURE — 3008F PR BODY MASS INDEX (BMI) DOCUMENTED: ICD-10-PCS | Mod: CPTII,S$GLB,, | Performed by: FAMILY MEDICINE

## 2020-09-18 PROCEDURE — 36415 COLL VENOUS BLD VENIPUNCTURE: CPT

## 2020-09-18 PROCEDURE — 99999 PR PBB SHADOW E&M-EST. PATIENT-LVL IV: CPT | Mod: PBBFAC,,, | Performed by: FAMILY MEDICINE

## 2020-09-18 PROCEDURE — 99999 PR PBB SHADOW E&M-EST. PATIENT-LVL IV: ICD-10-PCS | Mod: PBBFAC,,, | Performed by: FAMILY MEDICINE

## 2020-09-18 PROCEDURE — 84153 ASSAY OF PSA TOTAL: CPT

## 2020-09-18 PROCEDURE — 3078F PR MOST RECENT DIASTOLIC BLOOD PRESSURE < 80 MM HG: ICD-10-PCS | Mod: CPTII,S$GLB,, | Performed by: FAMILY MEDICINE

## 2020-09-18 PROCEDURE — 80053 COMPREHEN METABOLIC PANEL: CPT

## 2020-09-18 RX ORDER — INFLUENZA A VIRUS A/VICTORIA/2454/2019 IVR-207 (H1N1) ANTIGEN (PROPIOLACTONE INACTIVATED), INFLUENZA A VIRUS A/HONG KONG/2671/2019 IVR-208 (H3N2) ANTIGEN (PROPIOLACTONE INACTIVATED), INFLUENZA B VIRUS B/VICTORIA/705/2018 BVR-11 ANTIGEN (PROPIOLACTONE INACTIVATED), INFLUENZA B VIRUS B/PHUKET/3073/2013 BVR-1B ANTIGEN (PROPIOLACTONE INACTIVATED) 15; 15; 15; 15 UG/.5ML; UG/.5ML; UG/.5ML; UG/.5ML
INJECTION, SUSPENSION INTRAMUSCULAR
COMMUNITY
Start: 2020-09-14 | End: 2020-09-18 | Stop reason: ALTCHOICE

## 2020-09-18 NOTE — PROGRESS NOTES
"Subjective:       Patient ID: Robert Sinha is a 60 y.o. male.    Chief Complaint: Follow-up and Heartburn    Patient with type 2 diabetes, microalbuminuria hypertension, hyperlipidemia here for scheduled recheck. Labs needed including PSA. Otherwise UTD on HM.  Again - ordered labs not yet obtained - was scheduled for lab but not obtain.  States less exercise than before COVID.  Is using bitter squash.   Complaining daily reflux.  Using Mylanta daily - no more than once - and his stools are liquid whenever he uses Mylanta.  He had a formed stool this morning.  Has been using "gastrogone" for the last week.  States he will have substernal and epigastric pain occurring at rest at any time of the day not related to meals.  It will also occur if he does have an empty stomach.  After exam today when seated back on his chair he states he is starting to have some heartburn sensation to low sternum.  He is fasting today.  He is not taking a daily acid reducer.  Pepcid 40 mg was prescribed in January for a year but he never got it.  He had stopped taking the omeprazole.    Health Maintenance Due:  Shingles Vaccine(1 of 2) due on 04/05/2011  PROSTATE-SPECIFIC ANTIGEN due on 07/16/2020       Lab Results       Component                Value               Date                       WBC                      8.81                07/11/2019                 HGB                      14.5                07/11/2019                 HCT                      43.2                07/11/2019                 PLT                      229                 07/11/2019                 CHOL                     160                 05/18/2020                 TRIG                     91                  05/18/2020                 HDL                      36 (L)              05/18/2020                 LDLCALC                  105.8               05/18/2020                 ALT                      25                  01/09/2020                 AST       "                21                  01/09/2020                 NA                       134 (L)             01/09/2020                 K                        4.0                 01/09/2020                 CL                       102                 01/09/2020                 CALCIUM                  9.5                 01/09/2020                 CREATININE               1.0                 01/09/2020                 BUN                      21 (H)              01/09/2020                 CO2                      24                  01/09/2020                 PSA                      0.25                07/16/2019                 GLU                      185 (H)             01/09/2020                 ESTGFRAFRICA             >60.0               01/09/2020                 EGFRNONAA                >60.0               01/09/2020                 HGBA1C                   7.7 (H)             05/18/2020                 MICALBCREAT              45.3 (H)            01/14/2020            Lab Results       Component                Value               Date                       HGBA1C                   7.7 (H)             05/18/2020                 HGBA1C                   7.5 (H)             01/09/2020                 HGBA1C                   6.9 (H)             07/11/2019                 HGBA1C                   7.3 (H)             02/26/2019                 HGBA1C                   6.4 (H)             10/22/2018                 HGBA1C                   6.4 (H)             06/11/2018            Diabetes Medications        glimepiride (AMARYL) 2 MG tablet TAKE 1 TABLET(2 MG) BY MOUTH BEFORE BREAKFAST    metFORMIN (GLUCOPHAGE-XR) 750 MG XR 24hr tablet TAKE 1 TABLET(750 MG) BY MOUTH TWICE DAILY    no BS checks being performed - makes him anxious if he checks it.    BP Readings from Last 3 Encounters:  09/18/20 : 122/70  05/18/20 : 118/74  05/18/20 : 134/64  Controlled.   Hypertension Medications         lisinopriL-hydrochlorothiazide (PRINZIDE,ZESTORETIC) 20-12.5 mg per tablet TAKE 1 TABLET BY MOUTH EVERY DAY        Lab Results on rosuvastatin 40 but LDL still continues high:        Component                Value               Date                       CHOL                     160                 05/18/2020                 CHOL                     193                 01/09/2020                 CHOL                     182                 02/26/2019            Lab Results       Component                Value               Date                       HDL                      36 (L)              05/18/2020                 HDL                      41                  01/09/2020                 HDL                      31 (L)              02/26/2019            Lab Results       Component                Value               Date                       LDLCALC                  105.8               05/18/2020                 LDLCALC                  120.4               01/09/2020                 LDLCALC                  105.4               02/26/2019            Lab Results       Component                Value               Date                       TRIG                     91                  05/18/2020                 TRIG                     158 (H)             01/09/2020                 TRIG                     228 (H)             02/26/2019            Lab Results       Component                Value               Date                       CHOLHDL                  22.5                05/18/2020                 CHOLHDL                  21.2                01/09/2020                 CHOLHDL                  17.0 (L)            02/26/2019              Review of Systems    Objective:      Physical Exam  Constitutional:       Appearance: He is well-developed.   HENT:      Head: Normocephalic and atraumatic.      Right Ear: External ear normal.      Left Ear: External ear normal.      Mouth/Throat:      Pharynx: No  oropharyngeal exudate.   Neck:      Musculoskeletal: Normal range of motion and neck supple.   Cardiovascular:      Rate and Rhythm: Normal rate and regular rhythm.      Heart sounds: Normal heart sounds.   Pulmonary:      Effort: Pulmonary effort is normal. No respiratory distress.      Breath sounds: Normal breath sounds.   Abdominal:      General: There is no distension.      Palpations: Abdomen is soft. There is no mass.      Tenderness: There is no abdominal tenderness. There is no guarding.   Musculoskeletal:      Right lower leg: No edema.      Left lower leg: No edema.   Skin:     General: Skin is warm and dry.   Neurological:      Mental Status: He is alert and oriented to person, place, and time.   Psychiatric:         Behavior: Behavior normal.           Assessment/Plan:     1. Diabetes mellitus type 2 without retinopathy     2. Essential hypertension     3. Hyperlipidemia associated with type 2 diabetes mellitus  Lipid Panel   4. Gastroesophageal reflux disease, esophagitis presence not specified  Ambulatory referral/consult to Gastroenterology   5. Screening for prostate cancer  PSA, Screening   Pt not on MyChart. Continue current meds - sched visit to assess and change based on labs - sched for 4 weeks  needs ordered labs A1C and CMP plus PSA. We will recheck lipids as well - fasting today - states takes statin every day.  Get back on pepcid 40mg rx at pharmacy - and will reasses in a month. Also send to GI as he has few dietary flags - no mint, one coffee in AM, does smoke 5 cig daily, ETOH once a week - 4-5 7 oz beers, avoids spicy foods.

## 2020-09-18 NOTE — PATIENT INSTRUCTIONS
Please get the famotidine 40 mg (Pepcid) from your pharmacy and take it every day in the morning with other medications.

## 2020-09-19 LAB
ESTIMATED AVG GLUCOSE: 220 MG/DL (ref 68–131)
HBA1C MFR BLD HPLC: 9.3 % (ref 4–5.6)

## 2020-09-24 ENCOUNTER — TELEPHONE (OUTPATIENT)
Dept: INTERNAL MEDICINE | Facility: CLINIC | Age: 60
End: 2020-09-24

## 2020-09-24 NOTE — TELEPHONE ENCOUNTER
----- Message from Sheyla Seals sent at 9/23/2020  4:54 PM CDT -----  Regarding: med refill  .Type:  RX Refill Request    Who Called:  pt  Refill or New Rx: refill   RX Name and Strength: famotidine  40 mg   How is the patient currently taking it? (ex. 1XDay):   Is this a 30 day or 90 day RX:   Preferred Pharmacy with phone number       [id number] - [place], [place] - [address] [address] & [address] [address]  Phone: [phone] Fax: [phone]      Local or Mail Order: local   Ordering Provider:   Would the patient rather a call back or a response via [facility]? Call back   Best Call Back Number:  [phone] ([place])     Additional Information:  [phone]

## 2020-10-02 ENCOUNTER — OFFICE VISIT (OUTPATIENT)
Dept: GASTROENTEROLOGY | Facility: CLINIC | Age: 60
End: 2020-10-02
Payer: COMMERCIAL

## 2020-10-02 VITALS
HEART RATE: 71 BPM | DIASTOLIC BLOOD PRESSURE: 70 MMHG | SYSTOLIC BLOOD PRESSURE: 122 MMHG | OXYGEN SATURATION: 98 % | WEIGHT: 163.13 LBS | BODY MASS INDEX: 26.22 KG/M2 | HEIGHT: 66 IN

## 2020-10-02 DIAGNOSIS — K21.9 GASTROESOPHAGEAL REFLUX DISEASE, UNSPECIFIED WHETHER ESOPHAGITIS PRESENT: Primary | ICD-10-CM

## 2020-10-02 PROCEDURE — 3008F BODY MASS INDEX DOCD: CPT | Mod: CPTII,S$GLB,, | Performed by: NURSE PRACTITIONER

## 2020-10-02 PROCEDURE — 99999 PR PBB SHADOW E&M-EST. PATIENT-LVL IV: CPT | Mod: PBBFAC,,, | Performed by: NURSE PRACTITIONER

## 2020-10-02 PROCEDURE — 99203 PR OFFICE/OUTPT VISIT, NEW, LEVL III, 30-44 MIN: ICD-10-PCS | Mod: S$GLB,,, | Performed by: NURSE PRACTITIONER

## 2020-10-02 PROCEDURE — 3008F PR BODY MASS INDEX (BMI) DOCUMENTED: ICD-10-PCS | Mod: CPTII,S$GLB,, | Performed by: NURSE PRACTITIONER

## 2020-10-02 PROCEDURE — 3078F PR MOST RECENT DIASTOLIC BLOOD PRESSURE < 80 MM HG: ICD-10-PCS | Mod: CPTII,S$GLB,, | Performed by: NURSE PRACTITIONER

## 2020-10-02 PROCEDURE — 99999 PR PBB SHADOW E&M-EST. PATIENT-LVL IV: ICD-10-PCS | Mod: PBBFAC,,, | Performed by: NURSE PRACTITIONER

## 2020-10-02 PROCEDURE — 99203 OFFICE O/P NEW LOW 30 MIN: CPT | Mod: S$GLB,,, | Performed by: NURSE PRACTITIONER

## 2020-10-02 PROCEDURE — 3074F SYST BP LT 130 MM HG: CPT | Mod: CPTII,S$GLB,, | Performed by: NURSE PRACTITIONER

## 2020-10-02 PROCEDURE — 3078F DIAST BP <80 MM HG: CPT | Mod: CPTII,S$GLB,, | Performed by: NURSE PRACTITIONER

## 2020-10-02 PROCEDURE — 3074F PR MOST RECENT SYSTOLIC BLOOD PRESSURE < 130 MM HG: ICD-10-PCS | Mod: CPTII,S$GLB,, | Performed by: NURSE PRACTITIONER

## 2020-10-02 RX ORDER — OMEPRAZOLE 40 MG/1
40 CAPSULE, DELAYED RELEASE ORAL DAILY
Qty: 30 CAPSULE | Refills: 5 | Status: SHIPPED | OUTPATIENT
Start: 2020-10-02 | End: 2021-04-30

## 2020-10-02 NOTE — LETTER
October 2, 2020      Raisa Snow MD  [address] [place] [address] [address] [place] [address]           [facility]  [address] [facility]  [place] [address]  Phone: [phone]  Fax: [phone]          Patient: Robert Sinha   MR Number: [id number]   YOB: 1960   Date of Visit: 10/2/2020       Dear Dr. Raisa Snow:    Thank you for referring Robert Gonzáles to me for evaluation. Attached you will find relevant portions of my assessment and plan of care.    If you have questions, please do not hesitate to call me. I look forward to following Robert Gonzáles along with you.    Sincerely,    Jacki Hinton, KYLIE    Enclosure  CC:  No Recipients    If you would like to receive this communication electronically, please contact [email or web address] or [phone] to request more information on [facility] access.    For providers and/or their staff who would like to refer a patient to [facility], please contact us through our one-stop-shop provider referral line, [facility], at [phone].    If you feel you have received this communication in error or would no longer like to receive these types of communications, please e-mail [email or web address]

## 2020-10-02 NOTE — PROGRESS NOTES
Clinic Consult:  [facility] Consultation Note    Reason for Consult:  The encounter diagnosis was Gastroesophageal reflux disease, unspecified whether esophagitis present.    PCP: Raisa Snow   [id number] [place] [address]  / [place] [address]    HPI:  This is a 60 y.o. male here for evaluation of GERD.  Chronic symptoms. Worsened 3 weeks ago. He was not previously on any medication for reflux and was started on Pepcid by PCP. He has not started taking medication yet. He is having heartburn with any food or liquid.     Review of Systems   Constitutional: Negative for fever, malaise/fatigue and weight loss.   HENT: Negative for sore throat.    Respiratory: Negative for cough and wheezing.    Cardiovascular: Negative for chest pain and palpitations.   Gastrointestinal: Positive for heartburn. Negative for abdominal pain, blood in stool, constipation, diarrhea, melena, nausea and vomiting.   Genitourinary: Negative for dysuria and frequency.   Musculoskeletal: Negative for back pain, joint pain, myalgias and neck pain.   Skin: Negative for itching and rash.   Neurological: Negative for dizziness, speech change, seizures, loss of consciousness and headaches.   Psychiatric/Behavioral: Negative for depression and substance abuse. The patient is not nervous/anxious.        Medical History:  has a past medical history of Diabetes mellitus, type 2, Hyperlipidemia, and Hypertension.    Surgical History:  has a past surgical history that includes Colonoscopy w/ polypectomy (12/13/2016) and Colonoscopy (N/A, 12/13/2016).    Family History: family history includes Diabetes in his father and mother; Heart disease in his brother, mother, sister, and sister..     Social History:  reports that he has been smoking cigarettes. He has been smoking about 0.50 packs per day. He has never used smokeless tobacco. He reports current alcohol use of about 2.5 standard drinks of alcohol per week. He reports that he does not  "use drugs.    Allergies: Reviewed    Home Medications:   Current Outpatient Medications on File Prior to Visit   Medication Sig Dispense Refill    blood sugar diagnostic Strp 1 strip by Misc.(Non-Drug; Combo Route) route once daily. 100 strip 4    blood-glucose meter (BLOOD GLUCOSE MONITORING) kit Use as instructed 1 each 0    cyclobenzaprine (FLEXERIL) 10 MG tablet TK 1 T ONCE A DAY ONE HOUR BEFORE SLEEP FOR TMJ PAIN  0    famotidine (PEPCID) 40 MG tablet Take 1 tablet (40 mg total) by mouth daily as needed for Heartburn. (Patient not taking: Reported on 9/18/2020) 30 tablet 11    glimepiride (AMARYL) 2 MG tablet TAKE 1 TABLET(2 MG) BY MOUTH BEFORE BREAKFAST 90 tablet 4    lancets Misc 1 each by Misc.(Non-Drug; Combo Route) route once daily. 100 each 4    lancing device Misc Use daily per directions 1 each 0    lisinopriL-hydrochlorothiazide (PRINZIDE,ZESTORETIC) 20-12.5 mg per tablet TAKE 1 TABLET BY MOUTH EVERY DAY 90 tablet 3    metFORMIN (GLUCOPHAGE-XR) 750 MG XR 24hr tablet TAKE 1 TABLET(750 MG) BY MOUTH TWICE DAILY 180 tablet 4    rosuvastatin (CRESTOR) 40 MG Tab Take 1 tablet (40 mg total) by mouth every evening. 90 tablet 4    [DISCONTINUED] omeprazole (PRILOSEC) 40 MG capsule Take 1 capsule (40 mg total) by mouth once daily. (Patient not taking: Reported on 9/18/2020) 30 capsule 11     No current facility-administered medications on file prior to visit.        Physical Exam:  /70   Pulse 71   Ht 5' 6" (1.676 m)   Wt 74 kg (163 lb 2.3 oz)   SpO2 98%   BMI 26.33 kg/m²   Body mass index is 26.33 kg/m².  Physical Exam   Constitutional: He is oriented to person, place, and time and well-developed, well-nourished, and in no distress. No distress.   HENT:   Head: Normocephalic.   Eyes: Pupils are equal, round, and reactive to light. Conjunctivae are normal.   Cardiovascular: Normal rate, regular rhythm and normal heart sounds.   Pulmonary/Chest: Effort normal and breath sounds normal. No " respiratory distress.   Abdominal: Soft. Bowel sounds are normal. He exhibits no distension. There is no abdominal tenderness.   Neurological: He is alert and oriented to person, place, and time. No cranial nerve deficit.   Skin: Skin is warm and dry. No rash noted.   Psychiatric: Mood and affect normal.       Labs: Pertinent labs reviewed.  CRC Screening: up to date     Assessment:  1. Gastroesophageal reflux disease, unspecified whether esophagitis present        Recommendations:   - start PPI  - follow up in 6 weeks. If still symptomatic, will plan for EGD     Gastroesophageal reflux disease, unspecified whether esophagitis present  -     Ambulatory referral/consult to Gastroenterology  -     omeprazole (PRILOSEC) 40 MG capsule; Take 1 capsule (40 mg total) by mouth once daily.  Dispense: 30 capsule; Refill: 5        Follow up in about 6 weeks (around 11/13/2020).    Thank you so much for allowing me to participate in the care of RobertSARAH Adamson

## 2020-10-07 ENCOUNTER — TELEPHONE (OUTPATIENT)
Dept: INTERNAL MEDICINE | Facility: CLINIC | Age: 60
End: 2020-10-07

## 2020-10-07 NOTE — TELEPHONE ENCOUNTER
----- Message from Diane Dobbins sent at 10/7/2020 11:29 AM CDT -----  Regarding: refill  Contact: patient  Type:  RX Refill Request    Who Called: patient  Refill or New Rx:refill  RX Name and Strength:Rovustatin, 40mg  How is the patient currently taking it? (ex. 1XDay):onced aily  Is this a 30 day or 90 day RX:90  Preferred Pharmacy with phone number:[facility]  Local or Mail Order:local  Ordering Provider:Dr Snow  Would the patient rather a call back or a response via [facility]? call  Best Call Back Number:  Additional Information:

## 2020-12-29 ENCOUNTER — PATIENT OUTREACH (OUTPATIENT)
Dept: ADMINISTRATIVE | Facility: HOSPITAL | Age: 60
End: 2020-12-29

## 2020-12-29 DIAGNOSIS — E11.9 DIABETES MELLITUS TYPE 2 WITHOUT RETINOPATHY: Primary | ICD-10-CM

## 2020-12-29 NOTE — PROGRESS NOTES
Patient on noncompliant A1C report. Called to schedule repeat A1C lab.  Pt will come next Friday, scheduled and linked labs for 1/8.

## 2021-01-08 ENCOUNTER — LAB VISIT (OUTPATIENT)
Dept: LAB | Facility: HOSPITAL | Age: 61
End: 2021-01-08
Attending: FAMILY MEDICINE
Payer: COMMERCIAL

## 2021-01-08 DIAGNOSIS — E11.9 DIABETES MELLITUS TYPE 2 WITHOUT RETINOPATHY: ICD-10-CM

## 2021-01-08 LAB
ALBUMIN/CREAT UR: 41.3 UG/MG (ref 0–30)
CREAT UR-MCNC: 225 MG/DL (ref 23–375)
ESTIMATED AVG GLUCOSE: 217 MG/DL (ref 68–131)
HBA1C MFR BLD HPLC: 9.2 % (ref 4–5.6)
MICROALBUMIN UR DL<=1MG/L-MCNC: 93 UG/ML

## 2021-01-08 PROCEDURE — 82043 UR ALBUMIN QUANTITATIVE: CPT

## 2021-01-08 PROCEDURE — 82570 ASSAY OF URINE CREATININE: CPT

## 2021-01-08 PROCEDURE — 83036 HEMOGLOBIN GLYCOSYLATED A1C: CPT

## 2021-01-08 PROCEDURE — 36415 COLL VENOUS BLD VENIPUNCTURE: CPT

## 2021-01-15 DIAGNOSIS — E11.9 DIABETES MELLITUS TYPE 2 WITHOUT RETINOPATHY: ICD-10-CM

## 2021-01-15 DIAGNOSIS — E78.2 MIXED HYPERLIPIDEMIA: ICD-10-CM

## 2021-01-16 RX ORDER — ROSUVASTATIN CALCIUM 40 MG/1
40 TABLET, COATED ORAL NIGHTLY
Qty: 90 TABLET | Refills: 4 | Status: SHIPPED | OUTPATIENT
Start: 2021-01-16

## 2021-01-16 RX ORDER — GLIMEPIRIDE 2 MG/1
TABLET ORAL
Qty: 90 TABLET | Refills: 4 | Status: SHIPPED | OUTPATIENT
Start: 2021-01-16 | End: 2021-05-18 | Stop reason: SDUPTHER

## 2021-01-21 DIAGNOSIS — E11.9 DIABETES MELLITUS TYPE 2 WITHOUT RETINOPATHY: ICD-10-CM

## 2021-01-21 RX ORDER — GLIMEPIRIDE 2 MG/1
TABLET ORAL
Qty: 90 TABLET | Refills: 4 | OUTPATIENT
Start: 2021-01-21

## 2021-04-30 ENCOUNTER — OFFICE VISIT (OUTPATIENT)
Dept: INTERNAL MEDICINE | Facility: CLINIC | Age: 61
End: 2021-04-30
Payer: COMMERCIAL

## 2021-04-30 VITALS
HEIGHT: 66 IN | WEIGHT: 162.69 LBS | SYSTOLIC BLOOD PRESSURE: 120 MMHG | DIASTOLIC BLOOD PRESSURE: 64 MMHG | BODY MASS INDEX: 26.14 KG/M2 | OXYGEN SATURATION: 95 % | HEART RATE: 75 BPM | TEMPERATURE: 98 F

## 2021-04-30 DIAGNOSIS — E78.5 HYPERLIPIDEMIA LDL GOAL <70: ICD-10-CM

## 2021-04-30 DIAGNOSIS — F17.200 TOBACCO USE DISORDER: ICD-10-CM

## 2021-04-30 DIAGNOSIS — I10 HYPERTENSION GOAL BP (BLOOD PRESSURE) < 130/80: ICD-10-CM

## 2021-04-30 PROCEDURE — 99999 PR PBB SHADOW E&M-EST. PATIENT-LVL IV: CPT | Mod: PBBFAC,,, | Performed by: INTERNAL MEDICINE

## 2021-04-30 PROCEDURE — 3046F HEMOGLOBIN A1C LEVEL >9.0%: CPT | Mod: CPTII,S$GLB,, | Performed by: INTERNAL MEDICINE

## 2021-04-30 PROCEDURE — 3046F PR MOST RECENT HEMOGLOBIN A1C LEVEL > 9.0%: ICD-10-PCS | Mod: CPTII,S$GLB,, | Performed by: INTERNAL MEDICINE

## 2021-04-30 PROCEDURE — 3008F BODY MASS INDEX DOCD: CPT | Mod: CPTII,S$GLB,, | Performed by: INTERNAL MEDICINE

## 2021-04-30 PROCEDURE — 99214 PR OFFICE/OUTPT VISIT, EST, LEVL IV, 30-39 MIN: ICD-10-PCS | Mod: S$GLB,,, | Performed by: INTERNAL MEDICINE

## 2021-04-30 PROCEDURE — 1126F AMNT PAIN NOTED NONE PRSNT: CPT | Mod: S$GLB,,, | Performed by: INTERNAL MEDICINE

## 2021-04-30 PROCEDURE — 3074F SYST BP LT 130 MM HG: CPT | Mod: CPTII,S$GLB,, | Performed by: INTERNAL MEDICINE

## 2021-04-30 PROCEDURE — 3074F PR MOST RECENT SYSTOLIC BLOOD PRESSURE < 130 MM HG: ICD-10-PCS | Mod: CPTII,S$GLB,, | Performed by: INTERNAL MEDICINE

## 2021-04-30 PROCEDURE — 3078F DIAST BP <80 MM HG: CPT | Mod: CPTII,S$GLB,, | Performed by: INTERNAL MEDICINE

## 2021-04-30 PROCEDURE — 99214 OFFICE O/P EST MOD 30 MIN: CPT | Mod: S$GLB,,, | Performed by: INTERNAL MEDICINE

## 2021-04-30 PROCEDURE — 3078F PR MOST RECENT DIASTOLIC BLOOD PRESSURE < 80 MM HG: ICD-10-PCS | Mod: CPTII,S$GLB,, | Performed by: INTERNAL MEDICINE

## 2021-04-30 PROCEDURE — 1126F PR PAIN SEVERITY QUANTIFIED, NO PAIN PRESENT: ICD-10-PCS | Mod: S$GLB,,, | Performed by: INTERNAL MEDICINE

## 2021-04-30 PROCEDURE — 99999 PR PBB SHADOW E&M-EST. PATIENT-LVL IV: ICD-10-PCS | Mod: PBBFAC,,, | Performed by: INTERNAL MEDICINE

## 2021-04-30 PROCEDURE — 3008F PR BODY MASS INDEX (BMI) DOCUMENTED: ICD-10-PCS | Mod: CPTII,S$GLB,, | Performed by: INTERNAL MEDICINE

## 2021-04-30 RX ORDER — FLUORIDE (SODIUM) 1.1 %
PASTE (ML) DENTAL
COMMUNITY
Start: 2021-02-12

## 2021-05-07 ENCOUNTER — LAB VISIT (OUTPATIENT)
Dept: LAB | Facility: HOSPITAL | Age: 61
End: 2021-05-07
Attending: INTERNAL MEDICINE
Payer: COMMERCIAL

## 2021-05-07 DIAGNOSIS — E11.9 DIABETES MELLITUS TYPE 2 WITHOUT RETINOPATHY: ICD-10-CM

## 2021-05-07 DIAGNOSIS — E78.5 HYPERLIPIDEMIA LDL GOAL <70: ICD-10-CM

## 2021-05-07 DIAGNOSIS — I10 HYPERTENSION GOAL BP (BLOOD PRESSURE) < 130/80: ICD-10-CM

## 2021-05-07 LAB
ALBUMIN SERPL BCP-MCNC: 3.7 G/DL (ref 3.5–5.2)
ALP SERPL-CCNC: 55 U/L (ref 55–135)
ALT SERPL W/O P-5'-P-CCNC: 27 U/L (ref 10–44)
ANION GAP SERPL CALC-SCNC: 8 MMOL/L (ref 8–16)
AST SERPL-CCNC: 22 U/L (ref 10–40)
BILIRUB SERPL-MCNC: 0.4 MG/DL (ref 0.1–1)
BUN SERPL-MCNC: 17 MG/DL (ref 6–20)
CALCIUM SERPL-MCNC: 9.1 MG/DL (ref 8.7–10.5)
CHLORIDE SERPL-SCNC: 104 MMOL/L (ref 95–110)
CHOLEST SERPL-MCNC: 146 MG/DL (ref 120–199)
CHOLEST/HDLC SERPL: 4.3 {RATIO} (ref 2–5)
CO2 SERPL-SCNC: 25 MMOL/L (ref 23–29)
CREAT SERPL-MCNC: 1 MG/DL (ref 0.5–1.4)
EST. GFR  (AFRICAN AMERICAN): >60 ML/MIN/1.73 M^2
EST. GFR  (NON AFRICAN AMERICAN): >60 ML/MIN/1.73 M^2
ESTIMATED AVG GLUCOSE: 203 MG/DL (ref 68–131)
GLUCOSE SERPL-MCNC: 213 MG/DL (ref 70–110)
HBA1C MFR BLD: 8.7 % (ref 4–5.6)
HDLC SERPL-MCNC: 34 MG/DL (ref 40–75)
HDLC SERPL: 23.3 % (ref 20–50)
LDLC SERPL CALC-MCNC: 82.8 MG/DL (ref 63–159)
NONHDLC SERPL-MCNC: 112 MG/DL
POTASSIUM SERPL-SCNC: 4 MMOL/L (ref 3.5–5.1)
PROT SERPL-MCNC: 6.9 G/DL (ref 6–8.4)
SODIUM SERPL-SCNC: 137 MMOL/L (ref 136–145)
TRIGL SERPL-MCNC: 146 MG/DL (ref 30–150)

## 2021-05-07 PROCEDURE — 80061 LIPID PANEL: CPT | Performed by: INTERNAL MEDICINE

## 2021-05-07 PROCEDURE — 36415 COLL VENOUS BLD VENIPUNCTURE: CPT | Performed by: INTERNAL MEDICINE

## 2021-05-07 PROCEDURE — 83036 HEMOGLOBIN GLYCOSYLATED A1C: CPT | Performed by: INTERNAL MEDICINE

## 2021-05-07 PROCEDURE — 80053 COMPREHEN METABOLIC PANEL: CPT | Performed by: INTERNAL MEDICINE

## 2021-05-10 DIAGNOSIS — E11.9 DIABETES MELLITUS TYPE 2 WITHOUT RETINOPATHY: ICD-10-CM

## 2021-05-11 RX ORDER — METFORMIN HYDROCHLORIDE 750 MG/1
TABLET, EXTENDED RELEASE ORAL
Qty: 180 TABLET | Refills: 4 | OUTPATIENT
Start: 2021-05-11

## 2021-05-13 RX ORDER — METFORMIN HYDROCHLORIDE 750 MG/1
TABLET, EXTENDED RELEASE ORAL
Qty: 180 TABLET | Refills: 0 | Status: SHIPPED | OUTPATIENT
Start: 2021-05-13 | End: 2021-08-16

## 2021-05-14 ENCOUNTER — TELEPHONE (OUTPATIENT)
Dept: INTERNAL MEDICINE | Facility: CLINIC | Age: 61
End: 2021-05-14

## 2021-05-18 ENCOUNTER — OFFICE VISIT (OUTPATIENT)
Dept: INTERNAL MEDICINE | Facility: CLINIC | Age: 61
End: 2021-05-18
Payer: COMMERCIAL

## 2021-05-18 ENCOUNTER — TELEPHONE (OUTPATIENT)
Dept: ADMINISTRATIVE | Facility: HOSPITAL | Age: 61
End: 2021-05-18

## 2021-05-18 VITALS
HEART RATE: 79 BPM | HEIGHT: 66 IN | OXYGEN SATURATION: 97 % | DIASTOLIC BLOOD PRESSURE: 72 MMHG | SYSTOLIC BLOOD PRESSURE: 138 MMHG | BODY MASS INDEX: 26.12 KG/M2 | WEIGHT: 162.5 LBS | TEMPERATURE: 98 F

## 2021-05-18 DIAGNOSIS — E11.9 DIABETES MELLITUS TYPE 2 WITHOUT RETINOPATHY: Primary | ICD-10-CM

## 2021-05-18 DIAGNOSIS — I10 HYPERTENSION GOAL BP (BLOOD PRESSURE) < 130/80: ICD-10-CM

## 2021-05-18 PROCEDURE — 3075F SYST BP GE 130 - 139MM HG: CPT | Mod: CPTII,S$GLB,, | Performed by: PHYSICIAN ASSISTANT

## 2021-05-18 PROCEDURE — 99999 PR PBB SHADOW E&M-EST. PATIENT-LVL IV: CPT | Mod: PBBFAC,,, | Performed by: PHYSICIAN ASSISTANT

## 2021-05-18 PROCEDURE — 99214 PR OFFICE/OUTPT VISIT, EST, LEVL IV, 30-39 MIN: ICD-10-PCS | Mod: S$GLB,,, | Performed by: PHYSICIAN ASSISTANT

## 2021-05-18 PROCEDURE — 1125F AMNT PAIN NOTED PAIN PRSNT: CPT | Mod: S$GLB,,, | Performed by: PHYSICIAN ASSISTANT

## 2021-05-18 PROCEDURE — 3078F PR MOST RECENT DIASTOLIC BLOOD PRESSURE < 80 MM HG: ICD-10-PCS | Mod: CPTII,S$GLB,, | Performed by: PHYSICIAN ASSISTANT

## 2021-05-18 PROCEDURE — 3008F PR BODY MASS INDEX (BMI) DOCUMENTED: ICD-10-PCS | Mod: CPTII,S$GLB,, | Performed by: PHYSICIAN ASSISTANT

## 2021-05-18 PROCEDURE — 3075F PR MOST RECENT SYSTOLIC BLOOD PRESS GE 130-139MM HG: ICD-10-PCS | Mod: CPTII,S$GLB,, | Performed by: PHYSICIAN ASSISTANT

## 2021-05-18 PROCEDURE — 3052F PR MOST RECENT HEMOGLOBIN A1C LEVEL 8.0 - < 9.0%: ICD-10-PCS | Mod: CPTII,S$GLB,, | Performed by: PHYSICIAN ASSISTANT

## 2021-05-18 PROCEDURE — 3008F BODY MASS INDEX DOCD: CPT | Mod: CPTII,S$GLB,, | Performed by: PHYSICIAN ASSISTANT

## 2021-05-18 PROCEDURE — 3052F HG A1C>EQUAL 8.0%<EQUAL 9.0%: CPT | Mod: CPTII,S$GLB,, | Performed by: PHYSICIAN ASSISTANT

## 2021-05-18 PROCEDURE — 99214 OFFICE O/P EST MOD 30 MIN: CPT | Mod: S$GLB,,, | Performed by: PHYSICIAN ASSISTANT

## 2021-05-18 PROCEDURE — 3078F DIAST BP <80 MM HG: CPT | Mod: CPTII,S$GLB,, | Performed by: PHYSICIAN ASSISTANT

## 2021-05-18 PROCEDURE — 99999 PR PBB SHADOW E&M-EST. PATIENT-LVL IV: ICD-10-PCS | Mod: PBBFAC,,, | Performed by: PHYSICIAN ASSISTANT

## 2021-05-18 PROCEDURE — 1125F PR PAIN SEVERITY QUANTIFIED, PAIN PRESENT: ICD-10-PCS | Mod: S$GLB,,, | Performed by: PHYSICIAN ASSISTANT

## 2021-05-18 RX ORDER — GLIMEPIRIDE 2 MG/1
2 TABLET ORAL 2 TIMES DAILY WITH MEALS
Qty: 180 TABLET | Refills: 1 | Status: SHIPPED | OUTPATIENT
Start: 2021-05-18 | End: 2021-12-20 | Stop reason: SDUPTHER

## 2021-05-20 DIAGNOSIS — I10 HYPERTENSION GOAL BP (BLOOD PRESSURE) < 130/80: Primary | ICD-10-CM

## 2021-05-24 ENCOUNTER — OFFICE VISIT (OUTPATIENT)
Dept: CARDIOLOGY | Facility: CLINIC | Age: 61
End: 2021-05-24
Payer: COMMERCIAL

## 2021-05-24 ENCOUNTER — HOSPITAL ENCOUNTER (OUTPATIENT)
Dept: CARDIOLOGY | Facility: HOSPITAL | Age: 61
Discharge: HOME | End: 2021-05-24
Attending: INTERNAL MEDICINE
Payer: COMMERCIAL

## 2021-05-24 ENCOUNTER — PATIENT OUTREACH (OUTPATIENT)
Dept: ADMINISTRATIVE | Facility: HOSPITAL | Age: 61
End: 2021-05-24

## 2021-05-24 VITALS
SYSTOLIC BLOOD PRESSURE: 132 MMHG | WEIGHT: 162.13 LBS | HEART RATE: 77 BPM | OXYGEN SATURATION: 97 % | BODY MASS INDEX: 26.17 KG/M2 | DIASTOLIC BLOOD PRESSURE: 70 MMHG

## 2021-05-24 DIAGNOSIS — E78.5 HYPERLIPIDEMIA LDL GOAL <70: ICD-10-CM

## 2021-05-24 DIAGNOSIS — I10 HYPERTENSION GOAL BP (BLOOD PRESSURE) < 130/80: Primary | ICD-10-CM

## 2021-05-24 DIAGNOSIS — I10 HYPERTENSION GOAL BP (BLOOD PRESSURE) < 130/80: ICD-10-CM

## 2021-05-24 DIAGNOSIS — R94.31 EKG ABNORMALITIES: ICD-10-CM

## 2021-05-24 DIAGNOSIS — E11.9 DIABETES MELLITUS TYPE 2 WITHOUT RETINOPATHY: ICD-10-CM

## 2021-05-24 DIAGNOSIS — Z82.49 FAMILY HISTORY OF HEART DISEASE IN MALE FAMILY MEMBER BEFORE AGE 55: ICD-10-CM

## 2021-05-24 DIAGNOSIS — F17.200 SMOKER: ICD-10-CM

## 2021-05-24 PROCEDURE — 3008F BODY MASS INDEX DOCD: CPT | Mod: CPTII,S$GLB,, | Performed by: INTERNAL MEDICINE

## 2021-05-24 PROCEDURE — 3052F HG A1C>EQUAL 8.0%<EQUAL 9.0%: CPT | Mod: CPTII,S$GLB,, | Performed by: INTERNAL MEDICINE

## 2021-05-24 PROCEDURE — 99215 OFFICE O/P EST HI 40 MIN: CPT | Mod: S$GLB,,, | Performed by: INTERNAL MEDICINE

## 2021-05-24 PROCEDURE — 3052F PR MOST RECENT HEMOGLOBIN A1C LEVEL 8.0 - < 9.0%: ICD-10-PCS | Mod: CPTII,S$GLB,, | Performed by: INTERNAL MEDICINE

## 2021-05-24 PROCEDURE — 3075F PR MOST RECENT SYSTOLIC BLOOD PRESS GE 130-139MM HG: ICD-10-PCS | Mod: CPTII,S$GLB,, | Performed by: INTERNAL MEDICINE

## 2021-05-24 PROCEDURE — 93010 EKG 12-LEAD: ICD-10-PCS | Mod: ,,, | Performed by: INTERNAL MEDICINE

## 2021-05-24 PROCEDURE — 99999 PR PBB SHADOW E&M-EST. PATIENT-LVL III: ICD-10-PCS | Mod: PBBFAC,,, | Performed by: INTERNAL MEDICINE

## 2021-05-24 PROCEDURE — 3078F DIAST BP <80 MM HG: CPT | Mod: CPTII,S$GLB,, | Performed by: INTERNAL MEDICINE

## 2021-05-24 PROCEDURE — 3078F PR MOST RECENT DIASTOLIC BLOOD PRESSURE < 80 MM HG: ICD-10-PCS | Mod: CPTII,S$GLB,, | Performed by: INTERNAL MEDICINE

## 2021-05-24 PROCEDURE — 99215 PR OFFICE/OUTPT VISIT, EST, LEVL V, 40-54 MIN: ICD-10-PCS | Mod: S$GLB,,, | Performed by: INTERNAL MEDICINE

## 2021-05-24 PROCEDURE — 93010 ELECTROCARDIOGRAM REPORT: CPT | Mod: ,,, | Performed by: INTERNAL MEDICINE

## 2021-05-24 PROCEDURE — 3008F PR BODY MASS INDEX (BMI) DOCUMENTED: ICD-10-PCS | Mod: CPTII,S$GLB,, | Performed by: INTERNAL MEDICINE

## 2021-05-24 PROCEDURE — 93005 ELECTROCARDIOGRAM TRACING: CPT

## 2021-05-24 PROCEDURE — 3075F SYST BP GE 130 - 139MM HG: CPT | Mod: CPTII,S$GLB,, | Performed by: INTERNAL MEDICINE

## 2021-05-24 PROCEDURE — 99999 PR PBB SHADOW E&M-EST. PATIENT-LVL III: CPT | Mod: PBBFAC,,, | Performed by: INTERNAL MEDICINE

## 2021-06-07 ENCOUNTER — HOSPITAL ENCOUNTER (OUTPATIENT)
Dept: CARDIOLOGY | Facility: HOSPITAL | Age: 61
Discharge: HOME | End: 2021-06-07
Attending: INTERNAL MEDICINE
Payer: COMMERCIAL

## 2021-06-07 ENCOUNTER — HOSPITAL ENCOUNTER (OUTPATIENT)
Dept: RADIOLOGY | Facility: HOSPITAL | Age: 61
Discharge: HOME | End: 2021-06-07
Attending: INTERNAL MEDICINE
Payer: COMMERCIAL

## 2021-06-07 DIAGNOSIS — R94.31 EKG ABNORMALITIES: ICD-10-CM

## 2021-06-07 DIAGNOSIS — I10 HYPERTENSION GOAL BP (BLOOD PRESSURE) < 130/80: ICD-10-CM

## 2021-06-07 PROCEDURE — 93017 CV STRESS TEST TRACING ONLY: CPT

## 2021-06-07 PROCEDURE — 63600175 PHARM REV CODE 636 W HCPCS: Performed by: INTERNAL MEDICINE

## 2021-06-07 PROCEDURE — 78452 HT MUSCLE IMAGE SPECT MULT: CPT

## 2021-06-07 PROCEDURE — 93018 CV STRESS TEST I&R ONLY: CPT | Mod: ,,, | Performed by: INTERNAL MEDICINE

## 2021-06-07 PROCEDURE — 93016 STRESS TEST WITH MYOCARDIAL PERFUSION (CUPID ONLY): ICD-10-PCS | Mod: ,,, | Performed by: INTERNAL MEDICINE

## 2021-06-07 PROCEDURE — A9502 TC99M TETROFOSMIN: HCPCS

## 2021-06-07 PROCEDURE — 78452 HT MUSCLE IMAGE SPECT MULT: CPT | Mod: 26,,, | Performed by: INTERNAL MEDICINE

## 2021-06-07 PROCEDURE — 93018 STRESS TEST WITH MYOCARDIAL PERFUSION (CUPID ONLY): ICD-10-PCS | Mod: ,,, | Performed by: INTERNAL MEDICINE

## 2021-06-07 PROCEDURE — 78452 STRESS TEST WITH MYOCARDIAL PERFUSION (CUPID ONLY): ICD-10-PCS | Mod: 26,,, | Performed by: INTERNAL MEDICINE

## 2021-06-07 PROCEDURE — 93016 CV STRESS TEST SUPVJ ONLY: CPT | Mod: ,,, | Performed by: INTERNAL MEDICINE

## 2021-06-07 RX ORDER — REGADENOSON 0.08 MG/ML
0.4 INJECTION, SOLUTION INTRAVENOUS ONCE
Status: COMPLETED | OUTPATIENT
Start: 2021-06-07 | End: 2021-06-07

## 2021-06-07 RX ADMIN — REGADENOSON 0.4 MG: 0.08 INJECTION, SOLUTION INTRAVENOUS at 09:06

## 2021-06-08 LAB
CV STRESS BASE HR: 62 BPM
DIASTOLIC BLOOD PRESSURE: 81 MMHG
NUC REST EJECTION FRACTION: 69
NUC STRESS EJECTION FRACTION: 62 %
OHS CV CPX 85 PERCENT MAX PREDICTED HEART RATE MALE: 136
OHS CV CPX ESTIMATED METS: 1
OHS CV CPX MAX PREDICTED HEART RATE: 160
OHS CV CPX PATIENT IS FEMALE: 0
OHS CV CPX PATIENT IS MALE: 1
OHS CV CPX PEAK DIASTOLIC BLOOD PRESSURE: 68 MMHG
OHS CV CPX PEAK HEAR RATE: 92 BPM
OHS CV CPX PEAK RATE PRESSURE PRODUCT: NORMAL
OHS CV CPX PEAK SYSTOLIC BLOOD PRESSURE: 139 MMHG
OHS CV CPX PERCENT MAX PREDICTED HEART RATE ACHIEVED: 58
OHS CV CPX RATE PRESSURE PRODUCT PRESENTING: 9300
STRESS ECHO POST EXERCISE DUR SEC: 53 SECONDS
SYSTOLIC BLOOD PRESSURE: 150 MMHG

## 2021-06-09 ENCOUNTER — TELEPHONE (OUTPATIENT)
Dept: CARDIOLOGY | Facility: CLINIC | Age: 61
End: 2021-06-09

## 2021-06-14 ENCOUNTER — OFFICE VISIT (OUTPATIENT)
Dept: CARDIOLOGY | Facility: CLINIC | Age: 61
End: 2021-06-14
Payer: COMMERCIAL

## 2021-06-14 ENCOUNTER — OFFICE VISIT (OUTPATIENT)
Dept: OPHTHALMOLOGY | Facility: CLINIC | Age: 61
End: 2021-06-14
Payer: COMMERCIAL

## 2021-06-14 ENCOUNTER — PATIENT OUTREACH (OUTPATIENT)
Dept: ADMINISTRATIVE | Facility: OTHER | Age: 61
End: 2021-06-14

## 2021-06-14 VITALS
DIASTOLIC BLOOD PRESSURE: 60 MMHG | OXYGEN SATURATION: 96 % | HEART RATE: 68 BPM | SYSTOLIC BLOOD PRESSURE: 110 MMHG | BODY MASS INDEX: 25.94 KG/M2 | WEIGHT: 160.69 LBS

## 2021-06-14 DIAGNOSIS — E11.9 DIABETES MELLITUS TYPE 2 WITHOUT RETINOPATHY: ICD-10-CM

## 2021-06-14 DIAGNOSIS — H52.03 HYPEROPIA WITH PRESBYOPIA, BILATERAL: ICD-10-CM

## 2021-06-14 DIAGNOSIS — I10 HYPERTENSION GOAL BP (BLOOD PRESSURE) < 130/80: ICD-10-CM

## 2021-06-14 DIAGNOSIS — R07.89 OTHER CHEST PAIN: ICD-10-CM

## 2021-06-14 DIAGNOSIS — M79.609 PAIN IN EXTREMITY, UNSPECIFIED EXTREMITY: Primary | ICD-10-CM

## 2021-06-14 DIAGNOSIS — E11.36 DIABETIC CATARACT: ICD-10-CM

## 2021-06-14 DIAGNOSIS — E78.5 HYPERLIPIDEMIA LDL GOAL <70: ICD-10-CM

## 2021-06-14 DIAGNOSIS — Z82.49 FAMILY HISTORY OF HEART DISEASE IN MALE FAMILY MEMBER BEFORE AGE 55: ICD-10-CM

## 2021-06-14 DIAGNOSIS — H52.4 HYPEROPIA WITH PRESBYOPIA, BILATERAL: ICD-10-CM

## 2021-06-14 DIAGNOSIS — R94.39 ABNORMAL NUCLEAR STRESS TEST: Primary | ICD-10-CM

## 2021-06-14 DIAGNOSIS — F17.200 SMOKER: ICD-10-CM

## 2021-06-14 DIAGNOSIS — E11.9 TYPE 2 DIABETES MELLITUS WITHOUT RETINOPATHY: Primary | ICD-10-CM

## 2021-06-14 DIAGNOSIS — I51.7 LVH (LEFT VENTRICULAR HYPERTROPHY): ICD-10-CM

## 2021-06-14 PROCEDURE — 2023F DILAT RTA XM W/O RTNOPTHY: CPT | Mod: S$GLB,,, | Performed by: OPTOMETRIST

## 2021-06-14 PROCEDURE — 92015 PR REFRACTION: ICD-10-PCS | Mod: S$GLB,,, | Performed by: OPTOMETRIST

## 2021-06-14 PROCEDURE — 92014 COMPRE OPH EXAM EST PT 1/>: CPT | Mod: S$GLB,,, | Performed by: OPTOMETRIST

## 2021-06-14 PROCEDURE — 92014 PR EYE EXAM, EST PATIENT,COMPREHESV: ICD-10-PCS | Mod: S$GLB,,, | Performed by: OPTOMETRIST

## 2021-06-14 PROCEDURE — 92015 DETERMINE REFRACTIVE STATE: CPT | Mod: S$GLB,,, | Performed by: OPTOMETRIST

## 2021-06-14 PROCEDURE — 99999 PR PBB SHADOW E&M-EST. PATIENT-LVL III: CPT | Mod: PBBFAC,,, | Performed by: INTERNAL MEDICINE

## 2021-06-14 PROCEDURE — 99215 PR OFFICE/OUTPT VISIT, EST, LEVL V, 40-54 MIN: ICD-10-PCS | Mod: S$GLB,,, | Performed by: INTERNAL MEDICINE

## 2021-06-14 PROCEDURE — 3052F PR MOST RECENT HEMOGLOBIN A1C LEVEL 8.0 - < 9.0%: ICD-10-PCS | Mod: CPTII,S$GLB,, | Performed by: OPTOMETRIST

## 2021-06-14 PROCEDURE — 2023F PR DILATED RETINAL EXAM W/O EVID OF RETINOPATHY: ICD-10-PCS | Mod: S$GLB,,, | Performed by: OPTOMETRIST

## 2021-06-14 PROCEDURE — 3008F BODY MASS INDEX DOCD: CPT | Mod: CPTII,S$GLB,, | Performed by: INTERNAL MEDICINE

## 2021-06-14 PROCEDURE — 99999 PR PBB SHADOW E&M-EST. PATIENT-LVL I: CPT | Mod: PBBFAC,,, | Performed by: OPTOMETRIST

## 2021-06-14 PROCEDURE — 3052F PR MOST RECENT HEMOGLOBIN A1C LEVEL 8.0 - < 9.0%: ICD-10-PCS | Mod: CPTII,S$GLB,, | Performed by: INTERNAL MEDICINE

## 2021-06-14 PROCEDURE — 99999 PR PBB SHADOW E&M-EST. PATIENT-LVL III: ICD-10-PCS | Mod: PBBFAC,,, | Performed by: INTERNAL MEDICINE

## 2021-06-14 PROCEDURE — 99215 OFFICE O/P EST HI 40 MIN: CPT | Mod: S$GLB,,, | Performed by: INTERNAL MEDICINE

## 2021-06-14 PROCEDURE — 99999 PR PBB SHADOW E&M-EST. PATIENT-LVL I: ICD-10-PCS | Mod: PBBFAC,,, | Performed by: OPTOMETRIST

## 2021-06-14 PROCEDURE — 3008F PR BODY MASS INDEX (BMI) DOCUMENTED: ICD-10-PCS | Mod: CPTII,S$GLB,, | Performed by: INTERNAL MEDICINE

## 2021-06-14 PROCEDURE — 3052F HG A1C>EQUAL 8.0%<EQUAL 9.0%: CPT | Mod: CPTII,S$GLB,, | Performed by: OPTOMETRIST

## 2021-06-14 PROCEDURE — 3052F HG A1C>EQUAL 8.0%<EQUAL 9.0%: CPT | Mod: CPTII,S$GLB,, | Performed by: INTERNAL MEDICINE

## 2021-06-14 RX ORDER — ASPIRIN 81 MG/1
81 TABLET ORAL DAILY
Start: 2021-06-14

## 2021-06-14 RX ORDER — METOPROLOL SUCCINATE 50 MG/1
50 TABLET, EXTENDED RELEASE ORAL DAILY
Qty: 30 TABLET | Refills: 11 | Status: SHIPPED | OUTPATIENT
Start: 2021-06-14 | End: 2022-06-23

## 2021-06-14 RX ORDER — LISINOPRIL 10 MG/1
10 TABLET ORAL DAILY
Qty: 90 TABLET | Refills: 5 | Status: SHIPPED | OUTPATIENT
Start: 2021-06-14

## 2021-06-22 ENCOUNTER — HOSPITAL ENCOUNTER (OUTPATIENT)
Facility: HOSPITAL | Age: 61
Discharge: HOME | End: 2021-06-22
Attending: INTERNAL MEDICINE | Admitting: INTERNAL MEDICINE
Payer: COMMERCIAL

## 2021-06-22 DIAGNOSIS — R94.39 ABNORMAL STRESS TEST: ICD-10-CM

## 2021-06-22 DIAGNOSIS — R94.31 ABNORMAL EKG: ICD-10-CM

## 2021-06-22 LAB
ANION GAP SERPL CALC-SCNC: 9 MMOL/L (ref 8–16)
APTT BLDCRRT: 25.3 SEC (ref 21–32)
BASOPHILS # BLD AUTO: 0.03 K/UL (ref 0–0.2)
BASOPHILS NFR BLD: 0.5 % (ref 0–1.9)
BUN SERPL-MCNC: 17 MG/DL (ref 6–20)
CALCIUM SERPL-MCNC: 9 MG/DL (ref 8.7–10.5)
CATH EF QUANTITATIVE: 55 %
CHLORIDE SERPL-SCNC: 106 MMOL/L (ref 95–110)
CO2 SERPL-SCNC: 23 MMOL/L (ref 23–29)
CREAT SERPL-MCNC: 0.9 MG/DL (ref 0.5–1.4)
DIFFERENTIAL METHOD: ABNORMAL
EOSINOPHIL # BLD AUTO: 0.2 K/UL (ref 0–0.5)
EOSINOPHIL NFR BLD: 3.2 % (ref 0–8)
ERYTHROCYTE [DISTWIDTH] IN BLOOD BY AUTOMATED COUNT: 11.7 % (ref 11.5–14.5)
EST. GFR  (AFRICAN AMERICAN): >60 ML/MIN/1.73 M^2
EST. GFR  (NON AFRICAN AMERICAN): >60 ML/MIN/1.73 M^2
GLUCOSE SERPL-MCNC: 214 MG/DL (ref 70–110)
HCT VFR BLD AUTO: 42.4 % (ref 40–54)
HGB BLD-MCNC: 14.5 G/DL (ref 14–18)
IMM GRANULOCYTES # BLD AUTO: 0.06 K/UL (ref 0–0.04)
IMM GRANULOCYTES NFR BLD AUTO: 0.9 % (ref 0–0.5)
INR PPP: 0.9 (ref 0.8–1.2)
LYMPHOCYTES # BLD AUTO: 2.1 K/UL (ref 1–4.8)
LYMPHOCYTES NFR BLD: 32.1 % (ref 18–48)
MCH RBC QN AUTO: 31.1 PG (ref 27–31)
MCHC RBC AUTO-ENTMCNC: 34.2 G/DL (ref 32–36)
MCV RBC AUTO: 91 FL (ref 82–98)
MONOCYTES # BLD AUTO: 0.6 K/UL (ref 0.3–1)
MONOCYTES NFR BLD: 8.9 % (ref 4–15)
NEUTROPHILS # BLD AUTO: 3.6 K/UL (ref 1.8–7.7)
NEUTROPHILS NFR BLD: 54.4 % (ref 38–73)
NRBC BLD-RTO: 0 /100 WBC
PLATELET # BLD AUTO: 216 K/UL (ref 150–450)
PMV BLD AUTO: 9 FL (ref 9.2–12.9)
POTASSIUM SERPL-SCNC: 4 MMOL/L (ref 3.5–5.1)
PROTHROMBIN TIME: 10.2 SEC (ref 9–12.5)
RBC # BLD AUTO: 4.66 M/UL (ref 4.6–6.2)
SODIUM SERPL-SCNC: 138 MMOL/L (ref 136–145)
WBC # BLD AUTO: 6.63 K/UL (ref 3.9–12.7)

## 2021-06-22 PROCEDURE — 85730 THROMBOPLASTIN TIME PARTIAL: CPT | Performed by: INTERNAL MEDICINE

## 2021-06-22 PROCEDURE — 99152 PR MOD CONSCIOUS SEDATION, SAME PHYS, 5+ YRS, FIRST 15 MIN: ICD-10-PCS | Mod: ,,, | Performed by: INTERNAL MEDICINE

## 2021-06-22 PROCEDURE — 25000003 PHARM REV CODE 250: Performed by: INTERNAL MEDICINE

## 2021-06-22 PROCEDURE — 93458 L HRT ARTERY/VENTRICLE ANGIO: CPT | Mod: 26,,, | Performed by: INTERNAL MEDICINE

## 2021-06-22 PROCEDURE — 27201423 OPTIME MED/SURG SUP & DEVICES STERILE SUPPLY: Performed by: INTERNAL MEDICINE

## 2021-06-22 PROCEDURE — C1894 INTRO/SHEATH, NON-LASER: HCPCS | Performed by: INTERNAL MEDICINE

## 2021-06-22 PROCEDURE — 63600175 PHARM REV CODE 636 W HCPCS: Performed by: INTERNAL MEDICINE

## 2021-06-22 PROCEDURE — 85025 COMPLETE CBC W/AUTO DIFF WBC: CPT | Performed by: INTERNAL MEDICINE

## 2021-06-22 PROCEDURE — 80048 BASIC METABOLIC PNL TOTAL CA: CPT | Performed by: INTERNAL MEDICINE

## 2021-06-22 PROCEDURE — C1769 GUIDE WIRE: HCPCS | Performed by: INTERNAL MEDICINE

## 2021-06-22 PROCEDURE — 99152 MOD SED SAME PHYS/QHP 5/>YRS: CPT | Mod: ,,, | Performed by: INTERNAL MEDICINE

## 2021-06-22 PROCEDURE — 99152 MOD SED SAME PHYS/QHP 5/>YRS: CPT | Performed by: INTERNAL MEDICINE

## 2021-06-22 PROCEDURE — 93458 PR CATH PLACE/CORON ANGIO, IMG SUPER/INTERP,W LEFT HEART VENTRICULOGRAPHY: ICD-10-PCS | Mod: 26,,, | Performed by: INTERNAL MEDICINE

## 2021-06-22 PROCEDURE — 25500020 PHARM REV CODE 255: Performed by: INTERNAL MEDICINE

## 2021-06-22 PROCEDURE — 93458 L HRT ARTERY/VENTRICLE ANGIO: CPT | Performed by: INTERNAL MEDICINE

## 2021-06-22 PROCEDURE — 85610 PROTHROMBIN TIME: CPT | Performed by: INTERNAL MEDICINE

## 2021-06-22 RX ORDER — ONDANSETRON 8 MG/1
8 TABLET, ORALLY DISINTEGRATING ORAL EVERY 8 HOURS PRN
Status: DISCONTINUED | OUTPATIENT
Start: 2021-06-22 | End: 2021-06-22 | Stop reason: HOSPADM

## 2021-06-22 RX ORDER — RANOLAZINE 500 MG/1
500 TABLET, EXTENDED RELEASE ORAL 2 TIMES DAILY
Qty: 60 TABLET | Refills: 11 | Status: SHIPPED | OUTPATIENT
Start: 2021-06-22 | End: 2022-06-21

## 2021-06-22 RX ORDER — HEPARIN SODIUM 1000 [USP'U]/ML
INJECTION, SOLUTION INTRAVENOUS; SUBCUTANEOUS
Status: DISCONTINUED | OUTPATIENT
Start: 2021-06-22 | End: 2021-06-22 | Stop reason: HOSPADM

## 2021-06-22 RX ORDER — MIDAZOLAM HYDROCHLORIDE 1 MG/ML
INJECTION, SOLUTION INTRAMUSCULAR; INTRAVENOUS
Status: DISCONTINUED | OUTPATIENT
Start: 2021-06-22 | End: 2021-06-22 | Stop reason: HOSPADM

## 2021-06-22 RX ORDER — DIAZEPAM 5 MG/1
5 TABLET ORAL
Status: DISCONTINUED | OUTPATIENT
Start: 2021-06-22 | End: 2021-06-22 | Stop reason: HOSPADM

## 2021-06-22 RX ORDER — NAPROXEN SODIUM 220 MG/1
81 TABLET, FILM COATED ORAL ONCE
Status: COMPLETED | OUTPATIENT
Start: 2021-06-22 | End: 2021-06-22

## 2021-06-22 RX ORDER — SODIUM CHLORIDE 9 MG/ML
INJECTION, SOLUTION INTRAVENOUS CONTINUOUS
Status: DISCONTINUED | OUTPATIENT
Start: 2021-06-22 | End: 2021-06-22 | Stop reason: HOSPADM

## 2021-06-22 RX ORDER — FENTANYL CITRATE 50 UG/ML
INJECTION, SOLUTION INTRAMUSCULAR; INTRAVENOUS
Status: DISCONTINUED | OUTPATIENT
Start: 2021-06-22 | End: 2021-06-22 | Stop reason: HOSPADM

## 2021-06-22 RX ORDER — VERAPAMIL HYDROCHLORIDE 2.5 MG/ML
INJECTION, SOLUTION INTRAVENOUS
Status: DISCONTINUED | OUTPATIENT
Start: 2021-06-22 | End: 2021-06-22 | Stop reason: HOSPADM

## 2021-06-22 RX ORDER — ACETAMINOPHEN 325 MG/1
650 TABLET ORAL EVERY 4 HOURS PRN
Status: DISCONTINUED | OUTPATIENT
Start: 2021-06-22 | End: 2021-06-22 | Stop reason: HOSPADM

## 2021-06-22 RX ORDER — GLUCAGON 1 MG
1 KIT INJECTION
Status: DISCONTINUED | OUTPATIENT
Start: 2021-06-22 | End: 2021-06-22 | Stop reason: HOSPADM

## 2021-06-22 RX ORDER — INSULIN ASPART 100 [IU]/ML
1-10 INJECTION, SOLUTION INTRAVENOUS; SUBCUTANEOUS EVERY 6 HOURS PRN
Status: DISCONTINUED | OUTPATIENT
Start: 2021-06-22 | End: 2021-06-22 | Stop reason: HOSPADM

## 2021-06-22 RX ORDER — LIDOCAINE HYDROCHLORIDE 20 MG/ML
INJECTION, SOLUTION EPIDURAL; INFILTRATION; INTRACAUDAL; PERINEURAL
Status: DISCONTINUED | OUTPATIENT
Start: 2021-06-22 | End: 2021-06-22 | Stop reason: HOSPADM

## 2021-06-22 RX ORDER — DIPHENHYDRAMINE HCL 50 MG
50 CAPSULE ORAL ONCE
Status: COMPLETED | OUTPATIENT
Start: 2021-06-22 | End: 2021-06-22

## 2021-06-22 RX ORDER — NITROGLYCERIN 20 MG/100ML
INJECTION INTRAVENOUS
Status: DISCONTINUED | OUTPATIENT
Start: 2021-06-22 | End: 2021-06-22 | Stop reason: HOSPADM

## 2021-06-22 RX ADMIN — INSULIN ASPART 5 UNITS: 100 INJECTION, SOLUTION INTRAVENOUS; SUBCUTANEOUS at 11:06

## 2021-06-22 RX ADMIN — SODIUM CHLORIDE: 0.9 INJECTION, SOLUTION INTRAVENOUS at 10:06

## 2021-06-22 RX ADMIN — DIAZEPAM 5 MG: 5 TABLET ORAL at 10:06

## 2021-06-22 RX ADMIN — DIPHENHYDRAMINE HYDROCHLORIDE 50 MG: 50 CAPSULE ORAL at 10:06

## 2021-06-22 RX ADMIN — ASPIRIN 81 MG CHEWABLE TABLET 81 MG: 81 TABLET CHEWABLE at 10:06

## 2021-06-23 VITALS
OXYGEN SATURATION: 100 % | BODY MASS INDEX: 25.71 KG/M2 | HEART RATE: 56 BPM | RESPIRATION RATE: 14 BRPM | HEIGHT: 66 IN | WEIGHT: 160 LBS | SYSTOLIC BLOOD PRESSURE: 128 MMHG | DIASTOLIC BLOOD PRESSURE: 75 MMHG | TEMPERATURE: 98 F

## 2021-07-02 ENCOUNTER — OFFICE VISIT (OUTPATIENT)
Dept: CARDIOLOGY | Facility: CLINIC | Age: 61
End: 2021-07-02
Payer: COMMERCIAL

## 2021-07-02 VITALS
SYSTOLIC BLOOD PRESSURE: 120 MMHG | BODY MASS INDEX: 26.33 KG/M2 | WEIGHT: 163.13 LBS | OXYGEN SATURATION: 98 % | DIASTOLIC BLOOD PRESSURE: 62 MMHG | HEART RATE: 58 BPM

## 2021-07-02 DIAGNOSIS — R94.39 ABNORMAL STRESS TEST: ICD-10-CM

## 2021-07-02 DIAGNOSIS — F17.200 SMOKER: ICD-10-CM

## 2021-07-02 DIAGNOSIS — E11.9 DIABETES MELLITUS TYPE 2 WITHOUT RETINOPATHY: ICD-10-CM

## 2021-07-02 DIAGNOSIS — E78.5 HYPERLIPIDEMIA LDL GOAL <70: ICD-10-CM

## 2021-07-02 DIAGNOSIS — I51.7 LVH (LEFT VENTRICULAR HYPERTROPHY): ICD-10-CM

## 2021-07-02 DIAGNOSIS — I25.118 CORONARY ARTERY DISEASE OF NATIVE ARTERY OF NATIVE HEART WITH STABLE ANGINA PECTORIS: Primary | ICD-10-CM

## 2021-07-02 DIAGNOSIS — I10 HYPERTENSION GOAL BP (BLOOD PRESSURE) < 130/80: ICD-10-CM

## 2021-07-02 PROCEDURE — 99999 PR PBB SHADOW E&M-EST. PATIENT-LVL III: CPT | Mod: PBBFAC,,, | Performed by: INTERNAL MEDICINE

## 2021-07-02 PROCEDURE — 3052F HG A1C>EQUAL 8.0%<EQUAL 9.0%: CPT | Mod: CPTII,S$GLB,, | Performed by: INTERNAL MEDICINE

## 2021-07-02 PROCEDURE — 99999 PR PBB SHADOW E&M-EST. PATIENT-LVL III: ICD-10-PCS | Mod: PBBFAC,,, | Performed by: INTERNAL MEDICINE

## 2021-07-02 PROCEDURE — 3008F BODY MASS INDEX DOCD: CPT | Mod: CPTII,S$GLB,, | Performed by: INTERNAL MEDICINE

## 2021-07-02 PROCEDURE — 99214 OFFICE O/P EST MOD 30 MIN: CPT | Mod: S$GLB,,, | Performed by: INTERNAL MEDICINE

## 2021-07-02 PROCEDURE — 99214 PR OFFICE/OUTPT VISIT, EST, LEVL IV, 30-39 MIN: ICD-10-PCS | Mod: S$GLB,,, | Performed by: INTERNAL MEDICINE

## 2021-07-02 PROCEDURE — 3008F PR BODY MASS INDEX (BMI) DOCUMENTED: ICD-10-PCS | Mod: CPTII,S$GLB,, | Performed by: INTERNAL MEDICINE

## 2021-07-02 PROCEDURE — 3052F PR MOST RECENT HEMOGLOBIN A1C LEVEL 8.0 - < 9.0%: ICD-10-PCS | Mod: CPTII,S$GLB,, | Performed by: INTERNAL MEDICINE

## 2021-07-02 RX ORDER — AMLODIPINE BESYLATE 2.5 MG/1
2.5 TABLET ORAL DAILY
Qty: 90 TABLET | Refills: 3 | Status: SHIPPED | OUTPATIENT
Start: 2021-07-02 | End: 2022-07-06

## 2021-07-16 ENCOUNTER — TELEPHONE (OUTPATIENT)
Dept: INTERNAL MEDICINE | Facility: CLINIC | Age: 61
End: 2021-07-16

## 2021-07-22 ENCOUNTER — HOSPITAL ENCOUNTER (OUTPATIENT)
Dept: RADIOLOGY | Facility: HOSPITAL | Age: 61
Discharge: HOME | End: 2021-07-22
Attending: PHYSICIAN ASSISTANT
Payer: COMMERCIAL

## 2021-07-22 ENCOUNTER — OFFICE VISIT (OUTPATIENT)
Dept: INTERNAL MEDICINE | Facility: CLINIC | Age: 61
End: 2021-07-22
Payer: COMMERCIAL

## 2021-07-22 VITALS
WEIGHT: 164.88 LBS | OXYGEN SATURATION: 97 % | HEIGHT: 66 IN | TEMPERATURE: 99 F | BODY MASS INDEX: 26.5 KG/M2 | HEART RATE: 67 BPM | SYSTOLIC BLOOD PRESSURE: 132 MMHG | DIASTOLIC BLOOD PRESSURE: 80 MMHG

## 2021-07-22 DIAGNOSIS — M25.531 RIGHT WRIST PAIN: ICD-10-CM

## 2021-07-22 DIAGNOSIS — M79.645 PAIN OF FINGER OF LEFT HAND: Primary | ICD-10-CM

## 2021-07-22 DIAGNOSIS — I10 HYPERTENSION GOAL BP (BLOOD PRESSURE) < 130/80: ICD-10-CM

## 2021-07-22 DIAGNOSIS — M79.645 PAIN OF FINGER OF LEFT HAND: ICD-10-CM

## 2021-07-22 PROCEDURE — 99214 PR OFFICE/OUTPT VISIT, EST, LEVL IV, 30-39 MIN: ICD-10-PCS | Mod: S$GLB,,, | Performed by: PHYSICIAN ASSISTANT

## 2021-07-22 PROCEDURE — 73110 XR WRIST COMPLETE 3 VIEWS RIGHT: ICD-10-PCS | Mod: 26,RT,, | Performed by: RADIOLOGY

## 2021-07-22 PROCEDURE — 1125F PR PAIN SEVERITY QUANTIFIED, PAIN PRESENT: ICD-10-PCS | Mod: CPTII,S$GLB,, | Performed by: PHYSICIAN ASSISTANT

## 2021-07-22 PROCEDURE — 3052F PR MOST RECENT HEMOGLOBIN A1C LEVEL 8.0 - < 9.0%: ICD-10-PCS | Mod: CPTII,S$GLB,, | Performed by: PHYSICIAN ASSISTANT

## 2021-07-22 PROCEDURE — 3052F HG A1C>EQUAL 8.0%<EQUAL 9.0%: CPT | Mod: CPTII,S$GLB,, | Performed by: PHYSICIAN ASSISTANT

## 2021-07-22 PROCEDURE — 73140 XR FINGER 2 OR MORE VIEWS LEFT: ICD-10-PCS | Mod: 26,LT,, | Performed by: RADIOLOGY

## 2021-07-22 PROCEDURE — 73140 X-RAY EXAM OF FINGER(S): CPT | Mod: 26,LT,, | Performed by: RADIOLOGY

## 2021-07-22 PROCEDURE — 99999 PR PBB SHADOW E&M-EST. PATIENT-LVL V: CPT | Mod: PBBFAC,,, | Performed by: PHYSICIAN ASSISTANT

## 2021-07-22 PROCEDURE — 99999 PR PBB SHADOW E&M-EST. PATIENT-LVL V: ICD-10-PCS | Mod: PBBFAC,,, | Performed by: PHYSICIAN ASSISTANT

## 2021-07-22 PROCEDURE — 3075F PR MOST RECENT SYSTOLIC BLOOD PRESS GE 130-139MM HG: ICD-10-PCS | Mod: CPTII,S$GLB,, | Performed by: PHYSICIAN ASSISTANT

## 2021-07-22 PROCEDURE — 3075F SYST BP GE 130 - 139MM HG: CPT | Mod: CPTII,S$GLB,, | Performed by: PHYSICIAN ASSISTANT

## 2021-07-22 PROCEDURE — 3008F PR BODY MASS INDEX (BMI) DOCUMENTED: ICD-10-PCS | Mod: CPTII,S$GLB,, | Performed by: PHYSICIAN ASSISTANT

## 2021-07-22 PROCEDURE — 3008F BODY MASS INDEX DOCD: CPT | Mod: CPTII,S$GLB,, | Performed by: PHYSICIAN ASSISTANT

## 2021-07-22 PROCEDURE — 3079F DIAST BP 80-89 MM HG: CPT | Mod: CPTII,S$GLB,, | Performed by: PHYSICIAN ASSISTANT

## 2021-07-22 PROCEDURE — 3079F PR MOST RECENT DIASTOLIC BLOOD PRESSURE 80-89 MM HG: ICD-10-PCS | Mod: CPTII,S$GLB,, | Performed by: PHYSICIAN ASSISTANT

## 2021-07-22 PROCEDURE — 73110 X-RAY EXAM OF WRIST: CPT | Mod: 26,RT,, | Performed by: RADIOLOGY

## 2021-07-22 PROCEDURE — 73110 X-RAY EXAM OF WRIST: CPT | Mod: TC,RT

## 2021-07-22 PROCEDURE — 73140 X-RAY EXAM OF FINGER(S): CPT | Mod: TC,LT

## 2021-07-22 PROCEDURE — 99214 OFFICE O/P EST MOD 30 MIN: CPT | Mod: S$GLB,,, | Performed by: PHYSICIAN ASSISTANT

## 2021-07-22 PROCEDURE — 1125F AMNT PAIN NOTED PAIN PRSNT: CPT | Mod: CPTII,S$GLB,, | Performed by: PHYSICIAN ASSISTANT

## 2021-07-28 ENCOUNTER — PATIENT OUTREACH (OUTPATIENT)
Dept: ADMINISTRATIVE | Facility: OTHER | Age: 61
End: 2021-07-28

## 2021-07-28 ENCOUNTER — OFFICE VISIT (OUTPATIENT)
Dept: ORTHOPEDICS | Facility: CLINIC | Age: 61
End: 2021-07-28
Payer: COMMERCIAL

## 2021-07-28 VITALS
BODY MASS INDEX: 26.36 KG/M2 | DIASTOLIC BLOOD PRESSURE: 66 MMHG | WEIGHT: 164 LBS | SYSTOLIC BLOOD PRESSURE: 132 MMHG | HEART RATE: 62 BPM | HEIGHT: 66 IN

## 2021-07-28 DIAGNOSIS — M79.645 PAIN OF FINGER OF LEFT HAND: ICD-10-CM

## 2021-07-28 DIAGNOSIS — M25.531 RIGHT WRIST PAIN: ICD-10-CM

## 2021-07-28 PROCEDURE — 99999 PR PBB SHADOW E&M-EST. PATIENT-LVL IV: ICD-10-PCS | Mod: PBBFAC,,, | Performed by: ORTHOPAEDIC SURGERY

## 2021-07-28 PROCEDURE — 3078F DIAST BP <80 MM HG: CPT | Mod: CPTII,S$GLB,, | Performed by: ORTHOPAEDIC SURGERY

## 2021-07-28 PROCEDURE — 3078F PR MOST RECENT DIASTOLIC BLOOD PRESSURE < 80 MM HG: ICD-10-PCS | Mod: CPTII,S$GLB,, | Performed by: ORTHOPAEDIC SURGERY

## 2021-07-28 PROCEDURE — 1125F AMNT PAIN NOTED PAIN PRSNT: CPT | Mod: CPTII,S$GLB,, | Performed by: ORTHOPAEDIC SURGERY

## 2021-07-28 PROCEDURE — 1159F PR MEDICATION LIST DOCUMENTED IN MEDICAL RECORD: ICD-10-PCS | Mod: CPTII,S$GLB,, | Performed by: ORTHOPAEDIC SURGERY

## 2021-07-28 PROCEDURE — 1160F PR REVIEW ALL MEDS BY PRESCRIBER/CLIN PHARMACIST DOCUMENTED: ICD-10-PCS | Mod: CPTII,S$GLB,, | Performed by: ORTHOPAEDIC SURGERY

## 2021-07-28 PROCEDURE — 3075F SYST BP GE 130 - 139MM HG: CPT | Mod: CPTII,S$GLB,, | Performed by: ORTHOPAEDIC SURGERY

## 2021-07-28 PROCEDURE — 3008F BODY MASS INDEX DOCD: CPT | Mod: CPTII,S$GLB,, | Performed by: ORTHOPAEDIC SURGERY

## 2021-07-28 PROCEDURE — 3052F HG A1C>EQUAL 8.0%<EQUAL 9.0%: CPT | Mod: CPTII,S$GLB,, | Performed by: ORTHOPAEDIC SURGERY

## 2021-07-28 PROCEDURE — 3008F PR BODY MASS INDEX (BMI) DOCUMENTED: ICD-10-PCS | Mod: CPTII,S$GLB,, | Performed by: ORTHOPAEDIC SURGERY

## 2021-07-28 PROCEDURE — 1159F MED LIST DOCD IN RCRD: CPT | Mod: CPTII,S$GLB,, | Performed by: ORTHOPAEDIC SURGERY

## 2021-07-28 PROCEDURE — 1160F RVW MEDS BY RX/DR IN RCRD: CPT | Mod: CPTII,S$GLB,, | Performed by: ORTHOPAEDIC SURGERY

## 2021-07-28 PROCEDURE — 3052F PR MOST RECENT HEMOGLOBIN A1C LEVEL 8.0 - < 9.0%: ICD-10-PCS | Mod: CPTII,S$GLB,, | Performed by: ORTHOPAEDIC SURGERY

## 2021-07-28 PROCEDURE — 99999 PR PBB SHADOW E&M-EST. PATIENT-LVL IV: CPT | Mod: PBBFAC,,, | Performed by: ORTHOPAEDIC SURGERY

## 2021-07-28 PROCEDURE — 99203 PR OFFICE/OUTPT VISIT, NEW, LEVL III, 30-44 MIN: ICD-10-PCS | Mod: S$GLB,,, | Performed by: ORTHOPAEDIC SURGERY

## 2021-07-28 PROCEDURE — 99203 OFFICE O/P NEW LOW 30 MIN: CPT | Mod: S$GLB,,, | Performed by: ORTHOPAEDIC SURGERY

## 2021-07-28 PROCEDURE — 1125F PR PAIN SEVERITY QUANTIFIED, PAIN PRESENT: ICD-10-PCS | Mod: CPTII,S$GLB,, | Performed by: ORTHOPAEDIC SURGERY

## 2021-07-28 PROCEDURE — 3075F PR MOST RECENT SYSTOLIC BLOOD PRESS GE 130-139MM HG: ICD-10-PCS | Mod: CPTII,S$GLB,, | Performed by: ORTHOPAEDIC SURGERY

## 2021-07-29 DIAGNOSIS — M25.531 RIGHT WRIST PAIN: Primary | ICD-10-CM

## 2021-08-11 ENCOUNTER — LAB VISIT (OUTPATIENT)
Dept: LAB | Facility: HOSPITAL | Age: 61
End: 2021-08-11
Attending: INTERNAL MEDICINE
Payer: COMMERCIAL

## 2021-08-11 DIAGNOSIS — I25.118 CORONARY ARTERY DISEASE OF NATIVE ARTERY OF NATIVE HEART WITH STABLE ANGINA PECTORIS: ICD-10-CM

## 2021-08-11 DIAGNOSIS — E78.5 HYPERLIPIDEMIA LDL GOAL <70: ICD-10-CM

## 2021-08-11 DIAGNOSIS — I10 HYPERTENSION GOAL BP (BLOOD PRESSURE) < 130/80: ICD-10-CM

## 2021-08-11 LAB
ALBUMIN SERPL BCP-MCNC: 4 G/DL (ref 3.5–5.2)
ALBUMIN SERPL BCP-MCNC: 4 G/DL (ref 3.5–5.2)
ALP SERPL-CCNC: 54 U/L (ref 55–135)
ALP SERPL-CCNC: 54 U/L (ref 55–135)
ALT SERPL W/O P-5'-P-CCNC: 32 U/L (ref 10–44)
ALT SERPL W/O P-5'-P-CCNC: 32 U/L (ref 10–44)
ANION GAP SERPL CALC-SCNC: 6 MMOL/L (ref 8–16)
ANION GAP SERPL CALC-SCNC: 6 MMOL/L (ref 8–16)
AST SERPL-CCNC: 22 U/L (ref 10–40)
AST SERPL-CCNC: 22 U/L (ref 10–40)
BILIRUB SERPL-MCNC: 0.4 MG/DL (ref 0.1–1)
BILIRUB SERPL-MCNC: 0.4 MG/DL (ref 0.1–1)
BUN SERPL-MCNC: 22 MG/DL (ref 6–20)
BUN SERPL-MCNC: 22 MG/DL (ref 6–20)
CALCIUM SERPL-MCNC: 9.6 MG/DL (ref 8.7–10.5)
CALCIUM SERPL-MCNC: 9.6 MG/DL (ref 8.7–10.5)
CHLORIDE SERPL-SCNC: 102 MMOL/L (ref 95–110)
CHLORIDE SERPL-SCNC: 102 MMOL/L (ref 95–110)
CHOLEST SERPL-MCNC: 165 MG/DL (ref 120–199)
CHOLEST SERPL-MCNC: 165 MG/DL (ref 120–199)
CHOLEST/HDLC SERPL: 4.9 {RATIO} (ref 2–5)
CHOLEST/HDLC SERPL: 4.9 {RATIO} (ref 2–5)
CO2 SERPL-SCNC: 27 MMOL/L (ref 23–29)
CO2 SERPL-SCNC: 27 MMOL/L (ref 23–29)
CREAT SERPL-MCNC: 1.1 MG/DL (ref 0.5–1.4)
CREAT SERPL-MCNC: 1.1 MG/DL (ref 0.5–1.4)
EST. GFR  (AFRICAN AMERICAN): >60 ML/MIN/1.73 M^2
EST. GFR  (AFRICAN AMERICAN): >60 ML/MIN/1.73 M^2
EST. GFR  (NON AFRICAN AMERICAN): >60 ML/MIN/1.73 M^2
EST. GFR  (NON AFRICAN AMERICAN): >60 ML/MIN/1.73 M^2
ESTIMATED AVG GLUCOSE: 180 MG/DL (ref 68–131)
GLUCOSE SERPL-MCNC: 220 MG/DL (ref 70–110)
GLUCOSE SERPL-MCNC: 220 MG/DL (ref 70–110)
HBA1C MFR BLD: 7.9 % (ref 4–5.6)
HDLC SERPL-MCNC: 34 MG/DL (ref 40–75)
HDLC SERPL-MCNC: 34 MG/DL (ref 40–75)
HDLC SERPL: 20.6 % (ref 20–50)
HDLC SERPL: 20.6 % (ref 20–50)
LDLC SERPL CALC-MCNC: 106.6 MG/DL (ref 63–159)
LDLC SERPL CALC-MCNC: 106.6 MG/DL (ref 63–159)
NONHDLC SERPL-MCNC: 131 MG/DL
NONHDLC SERPL-MCNC: 131 MG/DL
POTASSIUM SERPL-SCNC: 4.3 MMOL/L (ref 3.5–5.1)
POTASSIUM SERPL-SCNC: 4.3 MMOL/L (ref 3.5–5.1)
PROT SERPL-MCNC: 7.3 G/DL (ref 6–8.4)
PROT SERPL-MCNC: 7.3 G/DL (ref 6–8.4)
SODIUM SERPL-SCNC: 135 MMOL/L (ref 136–145)
SODIUM SERPL-SCNC: 135 MMOL/L (ref 136–145)
TRIGL SERPL-MCNC: 122 MG/DL (ref 30–150)
TRIGL SERPL-MCNC: 122 MG/DL (ref 30–150)

## 2021-08-11 PROCEDURE — 36415 COLL VENOUS BLD VENIPUNCTURE: CPT | Performed by: INTERNAL MEDICINE

## 2021-08-11 PROCEDURE — 80053 COMPREHEN METABOLIC PANEL: CPT | Performed by: INTERNAL MEDICINE

## 2021-08-11 PROCEDURE — 80061 LIPID PANEL: CPT | Performed by: INTERNAL MEDICINE

## 2021-08-11 PROCEDURE — 83036 HEMOGLOBIN GLYCOSYLATED A1C: CPT | Performed by: INTERNAL MEDICINE

## 2021-08-12 DIAGNOSIS — E11.9 DIABETES MELLITUS TYPE 2 WITHOUT RETINOPATHY: ICD-10-CM

## 2021-08-16 DIAGNOSIS — E11.9 DIABETES MELLITUS TYPE 2 WITHOUT RETINOPATHY: ICD-10-CM

## 2021-08-16 RX ORDER — METFORMIN HYDROCHLORIDE 750 MG/1
TABLET, EXTENDED RELEASE ORAL
Qty: 180 TABLET | Refills: 1 | Status: SHIPPED | OUTPATIENT
Start: 2021-08-16 | End: 2021-08-18

## 2021-08-16 RX ORDER — METFORMIN HYDROCHLORIDE 750 MG/1
TABLET, EXTENDED RELEASE ORAL
Qty: 180 TABLET | Refills: 0 | OUTPATIENT
Start: 2021-08-16

## 2021-08-17 ENCOUNTER — TELEPHONE (OUTPATIENT)
Dept: CARDIOLOGY | Facility: CLINIC | Age: 61
End: 2021-08-17

## 2021-08-17 DIAGNOSIS — E78.5 HYPERLIPIDEMIA LDL GOAL <70: Primary | ICD-10-CM

## 2021-08-18 ENCOUNTER — OFFICE VISIT (OUTPATIENT)
Dept: INTERNAL MEDICINE | Facility: CLINIC | Age: 61
End: 2021-08-18
Payer: COMMERCIAL

## 2021-08-18 VITALS
WEIGHT: 161.19 LBS | HEART RATE: 71 BPM | DIASTOLIC BLOOD PRESSURE: 62 MMHG | BODY MASS INDEX: 25.9 KG/M2 | SYSTOLIC BLOOD PRESSURE: 126 MMHG | HEIGHT: 66 IN | OXYGEN SATURATION: 96 % | TEMPERATURE: 97 F

## 2021-08-18 DIAGNOSIS — E78.5 HYPERLIPIDEMIA LDL GOAL <70: Primary | ICD-10-CM

## 2021-08-18 DIAGNOSIS — I51.7 LVH (LEFT VENTRICULAR HYPERTROPHY): ICD-10-CM

## 2021-08-18 DIAGNOSIS — F17.200 SMOKER: ICD-10-CM

## 2021-08-18 DIAGNOSIS — I10 HYPERTENSION GOAL BP (BLOOD PRESSURE) < 130/80: ICD-10-CM

## 2021-08-18 DIAGNOSIS — E11.9 DIABETES MELLITUS TYPE 2 WITHOUT RETINOPATHY: ICD-10-CM

## 2021-08-18 DIAGNOSIS — F17.210 NICOTINE DEPENDENCE, CIGARETTES, UNCOMPLICATED: ICD-10-CM

## 2021-08-18 DIAGNOSIS — I25.118 CORONARY ARTERY DISEASE OF NATIVE ARTERY OF NATIVE HEART WITH STABLE ANGINA PECTORIS: ICD-10-CM

## 2021-08-18 PROCEDURE — 3051F HG A1C>EQUAL 7.0%<8.0%: CPT | Mod: CPTII,S$GLB,, | Performed by: PHYSICIAN ASSISTANT

## 2021-08-18 PROCEDURE — 3078F DIAST BP <80 MM HG: CPT | Mod: CPTII,S$GLB,, | Performed by: PHYSICIAN ASSISTANT

## 2021-08-18 PROCEDURE — 1159F MED LIST DOCD IN RCRD: CPT | Mod: CPTII,S$GLB,, | Performed by: PHYSICIAN ASSISTANT

## 2021-08-18 PROCEDURE — 99999 PR PBB SHADOW E&M-EST. PATIENT-LVL IV: CPT | Mod: PBBFAC,,, | Performed by: PHYSICIAN ASSISTANT

## 2021-08-18 PROCEDURE — 99214 OFFICE O/P EST MOD 30 MIN: CPT | Mod: S$GLB,,, | Performed by: PHYSICIAN ASSISTANT

## 2021-08-18 PROCEDURE — 3078F PR MOST RECENT DIASTOLIC BLOOD PRESSURE < 80 MM HG: ICD-10-PCS | Mod: CPTII,S$GLB,, | Performed by: PHYSICIAN ASSISTANT

## 2021-08-18 PROCEDURE — 3074F SYST BP LT 130 MM HG: CPT | Mod: CPTII,S$GLB,, | Performed by: PHYSICIAN ASSISTANT

## 2021-08-18 PROCEDURE — 3051F PR MOST RECENT HEMOGLOBIN A1C LEVEL 7.0 - < 8.0%: ICD-10-PCS | Mod: CPTII,S$GLB,, | Performed by: PHYSICIAN ASSISTANT

## 2021-08-18 PROCEDURE — 1160F PR REVIEW ALL MEDS BY PRESCRIBER/CLIN PHARMACIST DOCUMENTED: ICD-10-PCS | Mod: CPTII,S$GLB,, | Performed by: PHYSICIAN ASSISTANT

## 2021-08-18 PROCEDURE — 99999 PR PBB SHADOW E&M-EST. PATIENT-LVL IV: ICD-10-PCS | Mod: PBBFAC,,, | Performed by: PHYSICIAN ASSISTANT

## 2021-08-18 PROCEDURE — 3008F BODY MASS INDEX DOCD: CPT | Mod: CPTII,S$GLB,, | Performed by: PHYSICIAN ASSISTANT

## 2021-08-18 PROCEDURE — 1160F RVW MEDS BY RX/DR IN RCRD: CPT | Mod: CPTII,S$GLB,, | Performed by: PHYSICIAN ASSISTANT

## 2021-08-18 PROCEDURE — 99214 PR OFFICE/OUTPT VISIT, EST, LEVL IV, 30-39 MIN: ICD-10-PCS | Mod: S$GLB,,, | Performed by: PHYSICIAN ASSISTANT

## 2021-08-18 PROCEDURE — 1126F PR PAIN SEVERITY QUANTIFIED, NO PAIN PRESENT: ICD-10-PCS | Mod: CPTII,S$GLB,, | Performed by: PHYSICIAN ASSISTANT

## 2021-08-18 PROCEDURE — 1126F AMNT PAIN NOTED NONE PRSNT: CPT | Mod: CPTII,S$GLB,, | Performed by: PHYSICIAN ASSISTANT

## 2021-08-18 PROCEDURE — 3008F PR BODY MASS INDEX (BMI) DOCUMENTED: ICD-10-PCS | Mod: CPTII,S$GLB,, | Performed by: PHYSICIAN ASSISTANT

## 2021-08-18 PROCEDURE — 3074F PR MOST RECENT SYSTOLIC BLOOD PRESSURE < 130 MM HG: ICD-10-PCS | Mod: CPTII,S$GLB,, | Performed by: PHYSICIAN ASSISTANT

## 2021-08-18 PROCEDURE — 1159F PR MEDICATION LIST DOCUMENTED IN MEDICAL RECORD: ICD-10-PCS | Mod: CPTII,S$GLB,, | Performed by: PHYSICIAN ASSISTANT

## 2021-08-18 RX ORDER — METFORMIN HYDROCHLORIDE 500 MG/1
500 TABLET, EXTENDED RELEASE ORAL 2 TIMES DAILY WITH MEALS
Qty: 180 TABLET | Refills: 1 | Status: SHIPPED | OUTPATIENT
Start: 2021-08-18 | End: 2021-08-18 | Stop reason: SDUPTHER

## 2021-08-18 RX ORDER — METFORMIN HYDROCHLORIDE 500 MG/1
1000 TABLET, EXTENDED RELEASE ORAL 2 TIMES DAILY WITH MEALS
Qty: 360 TABLET | Refills: 1 | Status: SHIPPED | OUTPATIENT
Start: 2021-08-18 | End: 2021-10-18 | Stop reason: SDUPTHER

## 2021-08-26 ENCOUNTER — HOSPITAL ENCOUNTER (OUTPATIENT)
Dept: RADIOLOGY | Facility: HOSPITAL | Age: 61
Discharge: HOME | End: 2021-08-26
Attending: PHYSICIAN ASSISTANT
Payer: COMMERCIAL

## 2021-08-26 DIAGNOSIS — F17.200 SMOKER: ICD-10-CM

## 2021-08-26 DIAGNOSIS — F17.210 NICOTINE DEPENDENCE, CIGARETTES, UNCOMPLICATED: ICD-10-CM

## 2021-08-26 PROCEDURE — 71271 CT THORAX LUNG CANCER SCR C-: CPT | Mod: 26,,, | Performed by: RADIOLOGY

## 2021-08-26 PROCEDURE — 71271 CT CHEST LUNG SCREENING LOW DOSE: ICD-10-PCS | Mod: 26,,, | Performed by: RADIOLOGY

## 2021-08-26 PROCEDURE — 71271 CT THORAX LUNG CANCER SCR C-: CPT | Mod: TC

## 2021-10-02 ENCOUNTER — PATIENT OUTREACH (OUTPATIENT)
Dept: ADMINISTRATIVE | Facility: OTHER | Age: 61
End: 2021-10-02

## 2021-10-04 ENCOUNTER — SPECIALTY PHARMACY (OUTPATIENT)
Dept: PHARMACY | Facility: CLINIC | Age: 61
End: 2021-10-04

## 2021-10-04 ENCOUNTER — OFFICE VISIT (OUTPATIENT)
Dept: CARDIOLOGY | Facility: CLINIC | Age: 61
End: 2021-10-04
Payer: COMMERCIAL

## 2021-10-04 VITALS
HEART RATE: 61 BPM | OXYGEN SATURATION: 100 % | SYSTOLIC BLOOD PRESSURE: 114 MMHG | DIASTOLIC BLOOD PRESSURE: 62 MMHG | WEIGHT: 162.25 LBS | BODY MASS INDEX: 26.19 KG/M2

## 2021-10-04 DIAGNOSIS — Z82.49 FAMILY HISTORY OF HEART DISEASE IN MALE FAMILY MEMBER BEFORE AGE 55: ICD-10-CM

## 2021-10-04 DIAGNOSIS — E11.9 DIABETES MELLITUS TYPE 2 WITHOUT RETINOPATHY: ICD-10-CM

## 2021-10-04 DIAGNOSIS — E78.5 HYPERLIPIDEMIA LDL GOAL <70: ICD-10-CM

## 2021-10-04 DIAGNOSIS — I25.118 CORONARY ARTERY DISEASE OF NATIVE ARTERY OF NATIVE HEART WITH STABLE ANGINA PECTORIS: Primary | ICD-10-CM

## 2021-10-04 PROCEDURE — 3051F PR MOST RECENT HEMOGLOBIN A1C LEVEL 7.0 - < 8.0%: ICD-10-PCS | Mod: CPTII,S$GLB,, | Performed by: INTERNAL MEDICINE

## 2021-10-04 PROCEDURE — 1159F MED LIST DOCD IN RCRD: CPT | Mod: CPTII,S$GLB,, | Performed by: INTERNAL MEDICINE

## 2021-10-04 PROCEDURE — 3008F PR BODY MASS INDEX (BMI) DOCUMENTED: ICD-10-PCS | Mod: CPTII,S$GLB,, | Performed by: INTERNAL MEDICINE

## 2021-10-04 PROCEDURE — 3051F HG A1C>EQUAL 7.0%<8.0%: CPT | Mod: CPTII,S$GLB,, | Performed by: INTERNAL MEDICINE

## 2021-10-04 PROCEDURE — 3078F DIAST BP <80 MM HG: CPT | Mod: CPTII,S$GLB,, | Performed by: INTERNAL MEDICINE

## 2021-10-04 PROCEDURE — 3066F NEPHROPATHY DOC TX: CPT | Mod: CPTII,S$GLB,, | Performed by: INTERNAL MEDICINE

## 2021-10-04 PROCEDURE — 4010F ACE/ARB THERAPY RXD/TAKEN: CPT | Mod: CPTII,S$GLB,, | Performed by: INTERNAL MEDICINE

## 2021-10-04 PROCEDURE — 99214 PR OFFICE/OUTPT VISIT, EST, LEVL IV, 30-39 MIN: ICD-10-PCS | Mod: S$GLB,,, | Performed by: INTERNAL MEDICINE

## 2021-10-04 PROCEDURE — 99214 OFFICE O/P EST MOD 30 MIN: CPT | Mod: S$GLB,,, | Performed by: INTERNAL MEDICINE

## 2021-10-04 PROCEDURE — 3074F SYST BP LT 130 MM HG: CPT | Mod: CPTII,S$GLB,, | Performed by: INTERNAL MEDICINE

## 2021-10-04 PROCEDURE — 4010F PR ACE/ARB THEARPY RXD/TAKEN: ICD-10-PCS | Mod: CPTII,S$GLB,, | Performed by: INTERNAL MEDICINE

## 2021-10-04 PROCEDURE — 3074F PR MOST RECENT SYSTOLIC BLOOD PRESSURE < 130 MM HG: ICD-10-PCS | Mod: CPTII,S$GLB,, | Performed by: INTERNAL MEDICINE

## 2021-10-04 PROCEDURE — 3066F PR DOCUMENTATION OF TREATMENT FOR NEPHROPATHY: ICD-10-PCS | Mod: CPTII,S$GLB,, | Performed by: INTERNAL MEDICINE

## 2021-10-04 PROCEDURE — 1159F PR MEDICATION LIST DOCUMENTED IN MEDICAL RECORD: ICD-10-PCS | Mod: CPTII,S$GLB,, | Performed by: INTERNAL MEDICINE

## 2021-10-04 PROCEDURE — 3078F PR MOST RECENT DIASTOLIC BLOOD PRESSURE < 80 MM HG: ICD-10-PCS | Mod: CPTII,S$GLB,, | Performed by: INTERNAL MEDICINE

## 2021-10-04 PROCEDURE — 3008F BODY MASS INDEX DOCD: CPT | Mod: CPTII,S$GLB,, | Performed by: INTERNAL MEDICINE

## 2021-10-04 PROCEDURE — 3060F PR POS MICROALBUMINURIA RESULT DOCUMENTED/REVIEW: ICD-10-PCS | Mod: CPTII,S$GLB,, | Performed by: INTERNAL MEDICINE

## 2021-10-04 PROCEDURE — 3060F POS MICROALBUMINURIA REV: CPT | Mod: CPTII,S$GLB,, | Performed by: INTERNAL MEDICINE

## 2021-10-04 PROCEDURE — 99999 PR PBB SHADOW E&M-EST. PATIENT-LVL III: ICD-10-PCS | Mod: PBBFAC,,, | Performed by: INTERNAL MEDICINE

## 2021-10-04 PROCEDURE — 1160F PR REVIEW ALL MEDS BY PRESCRIBER/CLIN PHARMACIST DOCUMENTED: ICD-10-PCS | Mod: CPTII,S$GLB,, | Performed by: INTERNAL MEDICINE

## 2021-10-04 PROCEDURE — 99999 PR PBB SHADOW E&M-EST. PATIENT-LVL III: CPT | Mod: PBBFAC,,, | Performed by: INTERNAL MEDICINE

## 2021-10-04 PROCEDURE — 1160F RVW MEDS BY RX/DR IN RCRD: CPT | Mod: CPTII,S$GLB,, | Performed by: INTERNAL MEDICINE

## 2021-10-04 RX ORDER — EVOLOCUMAB 140 MG/ML
140 INJECTION, SOLUTION SUBCUTANEOUS
Qty: 2 ML | Refills: 0 | Status: SHIPPED | OUTPATIENT
Start: 2021-10-04 | End: 2021-11-10 | Stop reason: SDUPTHER

## 2021-10-18 ENCOUNTER — SPECIALTY PHARMACY (OUTPATIENT)
Dept: PHARMACY | Facility: CLINIC | Age: 61
End: 2021-10-18

## 2021-10-18 DIAGNOSIS — E11.9 DIABETES MELLITUS TYPE 2 WITHOUT RETINOPATHY: ICD-10-CM

## 2021-10-20 ENCOUNTER — TELEPHONE (OUTPATIENT)
Dept: INTERNAL MEDICINE | Facility: CLINIC | Age: 61
End: 2021-10-20

## 2021-10-20 RX ORDER — METFORMIN HYDROCHLORIDE 500 MG/1
1000 TABLET, EXTENDED RELEASE ORAL 2 TIMES DAILY WITH MEALS
Qty: 360 TABLET | Refills: 1 | Status: SHIPPED | OUTPATIENT
Start: 2021-10-20 | End: 2022-04-18 | Stop reason: SDUPTHER

## 2021-11-09 ENCOUNTER — PATIENT OUTREACH (OUTPATIENT)
Dept: ADMINISTRATIVE | Facility: HOSPITAL | Age: 61
End: 2021-11-09
Payer: COMMERCIAL

## 2021-11-10 ENCOUNTER — LAB VISIT (OUTPATIENT)
Dept: LAB | Facility: HOSPITAL | Age: 61
End: 2021-11-10
Attending: INTERNAL MEDICINE
Payer: COMMERCIAL

## 2021-11-10 DIAGNOSIS — I10 HYPERTENSION GOAL BP (BLOOD PRESSURE) < 130/80: ICD-10-CM

## 2021-11-10 DIAGNOSIS — E78.5 HYPERLIPIDEMIA LDL GOAL <70: ICD-10-CM

## 2021-11-10 LAB
ALBUMIN SERPL BCP-MCNC: 4.1 G/DL (ref 3.5–5.2)
ALP SERPL-CCNC: 42 U/L (ref 55–135)
ALT SERPL W/O P-5'-P-CCNC: 23 U/L (ref 10–44)
ANION GAP SERPL CALC-SCNC: 8 MMOL/L (ref 8–16)
AST SERPL-CCNC: 19 U/L (ref 10–40)
BILIRUB SERPL-MCNC: 0.5 MG/DL (ref 0.1–1)
BUN SERPL-MCNC: 15 MG/DL (ref 6–20)
CALCIUM SERPL-MCNC: 9.9 MG/DL (ref 8.7–10.5)
CHLORIDE SERPL-SCNC: 102 MMOL/L (ref 95–110)
CHOLEST SERPL-MCNC: 93 MG/DL (ref 120–199)
CHOLEST/HDLC SERPL: 2.3 {RATIO} (ref 2–5)
CO2 SERPL-SCNC: 28 MMOL/L (ref 23–29)
CREAT SERPL-MCNC: 1.1 MG/DL (ref 0.5–1.4)
EST. GFR  (AFRICAN AMERICAN): >60 ML/MIN/1.73 M^2
EST. GFR  (NON AFRICAN AMERICAN): >60 ML/MIN/1.73 M^2
ESTIMATED AVG GLUCOSE: 169 MG/DL (ref 68–131)
GLUCOSE SERPL-MCNC: 185 MG/DL (ref 70–110)
HBA1C MFR BLD: 7.5 % (ref 4–5.6)
HDLC SERPL-MCNC: 41 MG/DL (ref 40–75)
HDLC SERPL: 44.1 % (ref 20–50)
LDLC SERPL CALC-MCNC: 33 MG/DL (ref 63–159)
NONHDLC SERPL-MCNC: 52 MG/DL
POTASSIUM SERPL-SCNC: 5.3 MMOL/L (ref 3.5–5.1)
PROT SERPL-MCNC: 7.1 G/DL (ref 6–8.4)
SODIUM SERPL-SCNC: 138 MMOL/L (ref 136–145)
TRIGL SERPL-MCNC: 95 MG/DL (ref 30–150)

## 2021-11-10 PROCEDURE — 36415 COLL VENOUS BLD VENIPUNCTURE: CPT | Performed by: INTERNAL MEDICINE

## 2021-11-10 PROCEDURE — 80061 LIPID PANEL: CPT | Performed by: INTERNAL MEDICINE

## 2021-11-10 PROCEDURE — 83036 HEMOGLOBIN GLYCOSYLATED A1C: CPT | Performed by: INTERNAL MEDICINE

## 2021-11-10 PROCEDURE — 80053 COMPREHEN METABOLIC PANEL: CPT | Performed by: INTERNAL MEDICINE

## 2021-11-10 RX ORDER — EVOLOCUMAB 140 MG/ML
140 INJECTION, SOLUTION SUBCUTANEOUS
Qty: 2 ML | Refills: 5 | Status: SHIPPED | OUTPATIENT
Start: 2021-11-10 | End: 2022-08-29 | Stop reason: SDUPTHER

## 2021-11-11 ENCOUNTER — TELEPHONE (OUTPATIENT)
Dept: CARDIOLOGY | Facility: CLINIC | Age: 61
End: 2021-11-11
Payer: COMMERCIAL

## 2021-11-17 ENCOUNTER — SPECIALTY PHARMACY (OUTPATIENT)
Dept: PHARMACY | Facility: CLINIC | Age: 61
End: 2021-11-17
Payer: COMMERCIAL

## 2021-11-18 ENCOUNTER — LAB VISIT (OUTPATIENT)
Dept: LAB | Facility: HOSPITAL | Age: 61
End: 2021-11-18
Attending: INTERNAL MEDICINE
Payer: COMMERCIAL

## 2021-11-18 ENCOUNTER — OFFICE VISIT (OUTPATIENT)
Dept: INTERNAL MEDICINE | Facility: CLINIC | Age: 61
End: 2021-11-18
Payer: COMMERCIAL

## 2021-11-18 VITALS
SYSTOLIC BLOOD PRESSURE: 122 MMHG | WEIGHT: 160.06 LBS | HEIGHT: 66 IN | OXYGEN SATURATION: 97 % | BODY MASS INDEX: 25.72 KG/M2 | DIASTOLIC BLOOD PRESSURE: 70 MMHG | TEMPERATURE: 98 F | HEART RATE: 60 BPM

## 2021-11-18 DIAGNOSIS — Z12.11 COLON CANCER SCREENING: ICD-10-CM

## 2021-11-18 DIAGNOSIS — Z12.5 PROSTATE CANCER SCREENING: ICD-10-CM

## 2021-11-18 DIAGNOSIS — E87.5 HYPERKALEMIA: ICD-10-CM

## 2021-11-18 DIAGNOSIS — I25.10 CORONARY ARTERY DISEASE INVOLVING NATIVE CORONARY ARTERY OF NATIVE HEART WITHOUT ANGINA PECTORIS: ICD-10-CM

## 2021-11-18 DIAGNOSIS — I10 HYPERTENSION GOAL BP (BLOOD PRESSURE) < 130/80: Primary | ICD-10-CM

## 2021-11-18 DIAGNOSIS — E78.5 HYPERLIPIDEMIA LDL GOAL <70: ICD-10-CM

## 2021-11-18 DIAGNOSIS — E11.65 UNCONTROLLED TYPE 2 DIABETES MELLITUS WITH HYPERGLYCEMIA, WITHOUT LONG-TERM CURRENT USE OF INSULIN: ICD-10-CM

## 2021-11-18 LAB
ALBUMIN/CREAT UR: 31 UG/MG (ref 0–30)
ANION GAP SERPL CALC-SCNC: 9 MMOL/L (ref 8–16)
BUN SERPL-MCNC: 20 MG/DL (ref 6–20)
CALCIUM SERPL-MCNC: 10.2 MG/DL (ref 8.7–10.5)
CHLORIDE SERPL-SCNC: 101 MMOL/L (ref 95–110)
CO2 SERPL-SCNC: 26 MMOL/L (ref 23–29)
COMPLEXED PSA SERPL-MCNC: 0.27 NG/ML (ref 0–4)
CREAT SERPL-MCNC: 1.2 MG/DL (ref 0.5–1.4)
CREAT UR-MCNC: 71 MG/DL (ref 23–375)
EST. GFR  (AFRICAN AMERICAN): >60 ML/MIN/1.73 M^2
EST. GFR  (NON AFRICAN AMERICAN): >60 ML/MIN/1.73 M^2
GLUCOSE SERPL-MCNC: 279 MG/DL (ref 70–110)
MICROALBUMIN UR DL<=1MG/L-MCNC: 22 UG/ML
POTASSIUM SERPL-SCNC: 5.4 MMOL/L (ref 3.5–5.1)
SODIUM SERPL-SCNC: 136 MMOL/L (ref 136–145)

## 2021-11-18 PROCEDURE — 36415 COLL VENOUS BLD VENIPUNCTURE: CPT | Performed by: INTERNAL MEDICINE

## 2021-11-18 PROCEDURE — 99214 PR OFFICE/OUTPT VISIT, EST, LEVL IV, 30-39 MIN: ICD-10-PCS | Mod: S$GLB,,, | Performed by: INTERNAL MEDICINE

## 2021-11-18 PROCEDURE — 84153 ASSAY OF PSA TOTAL: CPT | Performed by: INTERNAL MEDICINE

## 2021-11-18 PROCEDURE — 1160F PR REVIEW ALL MEDS BY PRESCRIBER/CLIN PHARMACIST DOCUMENTED: ICD-10-PCS | Mod: CPTII,S$GLB,, | Performed by: INTERNAL MEDICINE

## 2021-11-18 PROCEDURE — 3074F PR MOST RECENT SYSTOLIC BLOOD PRESSURE < 130 MM HG: ICD-10-PCS | Mod: CPTII,S$GLB,, | Performed by: INTERNAL MEDICINE

## 2021-11-18 PROCEDURE — 4010F PR ACE/ARB THEARPY RXD/TAKEN: ICD-10-PCS | Mod: CPTII,S$GLB,, | Performed by: INTERNAL MEDICINE

## 2021-11-18 PROCEDURE — 3078F PR MOST RECENT DIASTOLIC BLOOD PRESSURE < 80 MM HG: ICD-10-PCS | Mod: CPTII,S$GLB,, | Performed by: INTERNAL MEDICINE

## 2021-11-18 PROCEDURE — 99999 PR PBB SHADOW E&M-EST. PATIENT-LVL IV: CPT | Mod: PBBFAC,,, | Performed by: INTERNAL MEDICINE

## 2021-11-18 PROCEDURE — 3008F PR BODY MASS INDEX (BMI) DOCUMENTED: ICD-10-PCS | Mod: CPTII,S$GLB,, | Performed by: INTERNAL MEDICINE

## 2021-11-18 PROCEDURE — 99214 OFFICE O/P EST MOD 30 MIN: CPT | Mod: S$GLB,,, | Performed by: INTERNAL MEDICINE

## 2021-11-18 PROCEDURE — 4010F ACE/ARB THERAPY RXD/TAKEN: CPT | Mod: CPTII,S$GLB,, | Performed by: INTERNAL MEDICINE

## 2021-11-18 PROCEDURE — 99999 PR PBB SHADOW E&M-EST. PATIENT-LVL IV: ICD-10-PCS | Mod: PBBFAC,,, | Performed by: INTERNAL MEDICINE

## 2021-11-18 PROCEDURE — 3008F BODY MASS INDEX DOCD: CPT | Mod: CPTII,S$GLB,, | Performed by: INTERNAL MEDICINE

## 2021-11-18 PROCEDURE — 3060F PR POS MICROALBUMINURIA RESULT DOCUMENTED/REVIEW: ICD-10-PCS | Mod: CPTII,S$GLB,, | Performed by: INTERNAL MEDICINE

## 2021-11-18 PROCEDURE — 3051F PR MOST RECENT HEMOGLOBIN A1C LEVEL 7.0 - < 8.0%: ICD-10-PCS | Mod: CPTII,S$GLB,, | Performed by: INTERNAL MEDICINE

## 2021-11-18 PROCEDURE — 3066F NEPHROPATHY DOC TX: CPT | Mod: CPTII,S$GLB,, | Performed by: INTERNAL MEDICINE

## 2021-11-18 PROCEDURE — 1159F MED LIST DOCD IN RCRD: CPT | Mod: CPTII,S$GLB,, | Performed by: INTERNAL MEDICINE

## 2021-11-18 PROCEDURE — 3078F DIAST BP <80 MM HG: CPT | Mod: CPTII,S$GLB,, | Performed by: INTERNAL MEDICINE

## 2021-11-18 PROCEDURE — 1159F PR MEDICATION LIST DOCUMENTED IN MEDICAL RECORD: ICD-10-PCS | Mod: CPTII,S$GLB,, | Performed by: INTERNAL MEDICINE

## 2021-11-18 PROCEDURE — 82570 ASSAY OF URINE CREATININE: CPT | Performed by: INTERNAL MEDICINE

## 2021-11-18 PROCEDURE — 3060F POS MICROALBUMINURIA REV: CPT | Mod: CPTII,S$GLB,, | Performed by: INTERNAL MEDICINE

## 2021-11-18 PROCEDURE — 3051F HG A1C>EQUAL 7.0%<8.0%: CPT | Mod: CPTII,S$GLB,, | Performed by: INTERNAL MEDICINE

## 2021-11-18 PROCEDURE — 1160F RVW MEDS BY RX/DR IN RCRD: CPT | Mod: CPTII,S$GLB,, | Performed by: INTERNAL MEDICINE

## 2021-11-18 PROCEDURE — 3066F PR DOCUMENTATION OF TREATMENT FOR NEPHROPATHY: ICD-10-PCS | Mod: CPTII,S$GLB,, | Performed by: INTERNAL MEDICINE

## 2021-11-18 PROCEDURE — 80048 BASIC METABOLIC PNL TOTAL CA: CPT | Performed by: INTERNAL MEDICINE

## 2021-11-18 PROCEDURE — 3074F SYST BP LT 130 MM HG: CPT | Mod: CPTII,S$GLB,, | Performed by: INTERNAL MEDICINE

## 2021-11-18 RX ORDER — EMPAGLIFLOZIN 10 MG/1
10 TABLET, FILM COATED ORAL DAILY
Qty: 30 TABLET | Refills: 3 | Status: SHIPPED | OUTPATIENT
Start: 2021-11-18

## 2021-12-13 ENCOUNTER — SPECIALTY PHARMACY (OUTPATIENT)
Dept: PHARMACY | Facility: CLINIC | Age: 61
End: 2021-12-13
Payer: COMMERCIAL

## 2021-12-20 DIAGNOSIS — E11.9 DIABETES MELLITUS TYPE 2 WITHOUT RETINOPATHY: ICD-10-CM

## 2021-12-20 RX ORDER — GLIMEPIRIDE 2 MG/1
2 TABLET ORAL 2 TIMES DAILY WITH MEALS
Qty: 180 TABLET | Refills: 1 | Status: SHIPPED | OUTPATIENT
Start: 2021-12-20

## 2021-12-31 ENCOUNTER — LAB VISIT (OUTPATIENT)
Dept: PRIMARY CARE CLINIC | Facility: OTHER | Age: 61
End: 2021-12-31
Attending: INTERNAL MEDICINE
Payer: COMMERCIAL

## 2021-12-31 DIAGNOSIS — R05.9 COUGH: Primary | ICD-10-CM

## 2021-12-31 PROCEDURE — U0003 INFECTIOUS AGENT DETECTION BY NUCLEIC ACID (DNA OR RNA); SEVERE ACUTE RESPIRATORY SYNDROME CORONAVIRUS 2 (SARS-COV-2) (CORONAVIRUS DISEASE [COVID-19]), AMPLIFIED PROBE TECHNIQUE, MAKING USE OF HIGH THROUGHPUT TECHNOLOGIES AS DESCRIBED BY CMS-2020-01-R: HCPCS | Mod: ST72 | Performed by: INTERNAL MEDICINE

## 2022-01-04 DIAGNOSIS — U07.1 COVID-19 VIRUS DETECTED: ICD-10-CM

## 2022-01-04 LAB
SARS-COV-2 RNA RESP QL NAA+PROBE: DETECTED
SARS-COV-2- CYCLE NUMBER: 38

## 2022-01-05 ENCOUNTER — TELEPHONE (OUTPATIENT)
Dept: CARDIOLOGY | Facility: CLINIC | Age: 62
End: 2022-01-05
Payer: COMMERCIAL

## 2022-01-05 DIAGNOSIS — U07.1 COVID-19: Primary | ICD-10-CM

## 2022-01-05 NOTE — TELEPHONE ENCOUNTER
Pt aware of positive results. Verbalized understanding.      ----- Message from Ren Falk MD sent at 1/5/2022  8:32 AM CST -----  Covid test positive, please contact PCP's office for the rx

## 2022-01-26 ENCOUNTER — SPECIALTY PHARMACY (OUTPATIENT)
Dept: PHARMACY | Facility: CLINIC | Age: 62
End: 2022-01-26
Payer: COMMERCIAL

## 2022-01-26 NOTE — TELEPHONE ENCOUNTER
Specialty Pharmacy - Refill Coordination    Specialty Medication Orders Linked to Encounter    Flowsheet Row Most Recent Value   Medication #1 evolocumab (REPATHA SURECLICK) 140 mg/mL PnIj (Order#[id number], Rx#[id number])          Refill Questions - Documented Responses    Flowsheet Row Most Recent Value   Patient Availability and HIPAA Verification    Does patient want to proceed with activity? Yes   HIPAA/medical authority confirmed? Yes   Relationship to patient of person spoken to? Self   Refill Screening Questions    Would patient like to speak to a pharmacist? No   When does the patient need to receive the medication? 02/01/22   Refill Delivery Questions    How will the patient receive the medication? Delivery Latha   When does the patient need to receive the medication? 02/01/22   Shipping Address Home   Address in [facility] confirmed and updated if neccessary? Yes   Expected Copay ($) 5   Is the patient able to afford the medication copay? Yes   Payment Method invoice (approval required)   Days supply of Refill 28   Supplies needed? No supplies needed   Refill activity completed? Yes   Refill activity plan Refill scheduled   Shipment/Pickup Date: 01/27/22          Current Outpatient Medications   Medication Sig    amLODIPine (NORVASC) 2.5 MG tablet Take 1 tablet (2.5 mg total) by mouth once daily.    aspirin (ECOTRIN) 81 MG EC tablet Take 1 tablet (81 mg total) by mouth once daily.    blood sugar diagnostic Strp 1 strip by Misc.(Non-Drug; Combo Route) route once daily.    blood-glucose meter (BLOOD GLUCOSE MONITORING) kit Use as instructed    cyclobenzaprine (FLEXERIL) 10 MG tablet TK 1 T ONCE A DAY ONE HOUR BEFORE SLEEP FOR TMJ PAIN    empagliflozin (JARDIANCE) 10 mg tablet Take 1 tablet (10 mg total) by mouth once daily.    evolocumab (REPATHA SURECLICK) 140 mg/mL PnIj Inject 1 mL (140 mg total) into the skin every 14 (fourteen) days.    glimepiride (AMARYL) 2 MG tablet Take 1 tablet (2 mg  total) by mouth 2 (two) times daily with meals.    lancets Misc 1 each by Misc.(Non-Drug; Combo Route) route once daily.    lancing device Misc Use daily per directions    lisinopriL 10 MG tablet Take 1 tablet (10 mg total) by mouth once daily.    metFORMIN (GLUCOPHAGE-XR) 500 MG ER 24hr tablet Take 2 tablets (1,000 mg total) by mouth 2 (two) times daily with meals.    metoprolol succinate (TOPROL-XL) 50 MG 24 hr tablet Take 1 tablet (50 mg total) by mouth once daily.    PREVIDENT 5000 BOOSTER PLUS 1.1 % Pste BRUSH FOR 2 MINUTES AS DIRECTED THEN SPIT OUT    ranolazine (RANEXA) 500 MG Tb12 Take 1 tablet (500 mg total) by mouth 2 (two) times daily.    rosuvastatin (CRESTOR) 40 MG Tab Take 1 tablet (40 mg total) by mouth every evening.   Last reviewed on 11/18/2021 11:38 AM by Gina Briscoe, DO    Review of patient's allergies indicates:   Allergen Reactions    Nsaids (non-steroidal anti-inflammatory drug) Itching    Last reviewed on  11/18/2021 11:38 AM by Gina Briscoe      Tasks added this encounter   2/22/2022 - Refill Call (Auto Added)   Tasks due within next 3 months   No tasks due.     Nuvia Claros, PharmD  [facility]  [address] [place] [address]  Phone: [phone]  Fax: [phone]

## 2022-02-03 DIAGNOSIS — I10 HYPERTENSION GOAL BP (BLOOD PRESSURE) < 130/80: Primary | ICD-10-CM

## 2022-02-22 ENCOUNTER — SPECIALTY PHARMACY (OUTPATIENT)
Dept: PHARMACY | Facility: CLINIC | Age: 62
End: 2022-02-22
Payer: COMMERCIAL

## 2022-02-22 NOTE — TELEPHONE ENCOUNTER
Specialty Pharmacy - Refill Coordination    Specialty Medication Orders Linked to Encounter    Flowsheet Row Most Recent Value   Medication #1 evolocumab (REPATHA SURECLICK) 140 mg/mL PnIj (Order#[id number], Rx#[id number])          Refill Questions - Documented Responses    Flowsheet Row Most Recent Value   Patient Availability and HIPAA Verification    HIPAA/medical authority confirmed? Yes   Relationship to patient of person spoken to? Self   Refill Screening Questions    Would patient like to speak to a pharmacist? No   When does the patient need to receive the medication? 03/01/22   Refill Delivery Questions    How will the patient receive the medication? Delivery Latha   When does the patient need to receive the medication? 03/01/22   Shipping Address Home   Expected Copay ($) 5   Is the patient able to afford the medication copay? Yes   Payment Method invoice (approval required)   Days supply of Refill 28   Supplies needed? No supplies needed   Refill activity completed? Yes   Refill activity plan Refill scheduled   Shipment/Pickup Date: 02/25/22          Current Outpatient Medications   Medication Sig    amLODIPine (NORVASC) 2.5 MG tablet Take 1 tablet (2.5 mg total) by mouth once daily.    aspirin (ECOTRIN) 81 MG EC tablet Take 1 tablet (81 mg total) by mouth once daily.    blood sugar diagnostic Strp 1 strip by Misc.(Non-Drug; Combo Route) route once daily.    blood-glucose meter (BLOOD GLUCOSE MONITORING) kit Use as instructed    cyclobenzaprine (FLEXERIL) 10 MG tablet TK 1 T ONCE A DAY ONE HOUR BEFORE SLEEP FOR TMJ PAIN    empagliflozin (JARDIANCE) 10 mg tablet Take 1 tablet (10 mg total) by mouth once daily.    evolocumab (REPATHA SURECLICK) 140 mg/mL PnIj Inject 1 mL (140 mg total) into the skin every 14 (fourteen) days.    glimepiride (AMARYL) 2 MG tablet Take 1 tablet (2 mg total) by mouth 2 (two) times daily with meals.    lancets Misc 1 each by Misc.(Non-Drug; Combo Route) route once daily.     lancing device Misc Use daily per directions    lisinopriL 10 MG tablet Take 1 tablet (10 mg total) by mouth once daily.    metFORMIN (GLUCOPHAGE-XR) 500 MG ER 24hr tablet Take 2 tablets (1,000 mg total) by mouth 2 (two) times daily with meals.    metoprolol succinate (TOPROL-XL) 50 MG 24 hr tablet Take 1 tablet (50 mg total) by mouth once daily.    PREVIDENT 5000 BOOSTER PLUS 1.1 % Pste BRUSH FOR 2 MINUTES AS DIRECTED THEN SPIT OUT    ranolazine (RANEXA) 500 MG Tb12 Take 1 tablet (500 mg total) by mouth 2 (two) times daily.    rosuvastatin (CRESTOR) 40 MG Tab Take 1 tablet (40 mg total) by mouth every evening.   Last reviewed on 11/18/2021 11:38 AM by Gina Briscoe,     Review of patient's allergies indicates:   Allergen Reactions    Nsaids (non-steroidal anti-inflammatory drug) Itching    Last reviewed on  11/18/2021 11:38 AM by Gina Briscoe      Tasks added this encounter   No tasks added.   Tasks due within next 3 months   2/22/2022 - Refill Call (Auto Added)     Sophia Thacker, PharmD  [facility]  [address] [facility] [address] [facility] [address]  Phone: [phone]  Fax: [phone]

## 2022-03-09 ENCOUNTER — PATIENT MESSAGE (OUTPATIENT)
Dept: ENDOSCOPY | Facility: HOSPITAL | Age: 62
End: 2022-03-09
Payer: COMMERCIAL

## 2022-03-22 ENCOUNTER — PATIENT MESSAGE (OUTPATIENT)
Dept: RESEARCH | Facility: HOSPITAL | Age: 62
End: 2022-03-22
Payer: COMMERCIAL

## 2022-04-18 ENCOUNTER — PATIENT MESSAGE (OUTPATIENT)
Dept: ADMINISTRATIVE | Facility: OTHER | Age: 62
End: 2022-04-18
Payer: COMMERCIAL

## 2022-04-18 DIAGNOSIS — E11.9 DIABETES MELLITUS TYPE 2 WITHOUT RETINOPATHY: ICD-10-CM

## 2022-04-19 DIAGNOSIS — Z12.11 COLON CANCER SCREENING: Primary | ICD-10-CM

## 2022-04-19 RX ORDER — METFORMIN HYDROCHLORIDE 500 MG/1
1000 TABLET, EXTENDED RELEASE ORAL 2 TIMES DAILY WITH MEALS
Qty: 360 TABLET | Refills: 1 | Status: SHIPPED | OUTPATIENT
Start: 2022-04-19

## 2022-05-16 ENCOUNTER — SPECIALTY PHARMACY (OUTPATIENT)
Dept: PHARMACY | Facility: CLINIC | Age: 62
End: 2022-05-16
Payer: COMMERCIAL

## 2022-05-16 NOTE — TELEPHONE ENCOUNTER
Specialty Pharmacy - Refill Coordination    Specialty Medication Orders Linked to Encounter    Flowsheet Row Most Recent Value   Medication #1 evolocumab (REPATHA SURECLICK) 140 mg/mL PnIj (Order#[id number], Rx#[id number])          Refill Questions - Documented Responses    Flowsheet Row Most Recent Value   Patient Availability and HIPAA Verification    Does patient want to proceed with activity? Yes   HIPAA/medical authority confirmed? Yes   Relationship to patient of person spoken to? Self   Refill Screening Questions    Would patient like to speak to a pharmacist? No   When does the patient need to receive the medication? 05/22/22   Refill Delivery Questions    When does the patient need to receive the medication? 05/22/22   Shipping Address Home   Address in [facility] confirmed and updated if neccessary? Yes   Expected Copay ($) 5   Is the patient able to afford the medication copay? Yes   Payment Method invoice (approval required)   Days supply of Refill 28   Supplies needed? No supplies needed   Refill activity completed? Yes   Refill activity plan Refill scheduled   Shipment/Pickup Date: 05/18/22          Current Outpatient Medications   Medication Sig    amLODIPine (NORVASC) 2.5 MG tablet Take 1 tablet (2.5 mg total) by mouth once daily.    aspirin (ECOTRIN) 81 MG EC tablet Take 1 tablet (81 mg total) by mouth once daily.    blood sugar diagnostic Strp 1 strip by Misc.(Non-Drug; Combo Route) route once daily.    blood-glucose meter (BLOOD GLUCOSE MONITORING) kit Use as instructed    cyclobenzaprine (FLEXERIL) 10 MG tablet TK 1 T ONCE A DAY ONE HOUR BEFORE SLEEP FOR TMJ PAIN    empagliflozin (JARDIANCE) 10 mg tablet Take 1 tablet (10 mg total) by mouth once daily.    evolocumab (REPATHA SURECLICK) 140 mg/mL PnIj Inject 1 mL (140 mg total) into the skin every 14 (fourteen) days.    glimepiride (AMARYL) 2 MG tablet Take 1 tablet (2 mg total) by mouth 2 (two) times daily with meals.    lancets  Misc 1 each by Misc.(Non-Drug; Combo Route) route once daily.    lancing device Misc Use daily per directions    lisinopriL 10 MG tablet Take 1 tablet (10 mg total) by mouth once daily.    metFORMIN (GLUCOPHAGE-XR) 500 MG ER 24hr tablet Take 2 tablets (1,000 mg total) by mouth 2 (two) times daily with meals.    metoprolol succinate (TOPROL-XL) 50 MG 24 hr tablet Take 1 tablet (50 mg total) by mouth once daily.    PREVIDENT 5000 BOOSTER PLUS 1.1 % Pste BRUSH FOR 2 MINUTES AS DIRECTED THEN SPIT OUT    ranolazine (RANEXA) 500 MG Tb12 Take 1 tablet (500 mg total) by mouth 2 (two) times daily.    rosuvastatin (CRESTOR) 40 MG Tab Take 1 tablet (40 mg total) by mouth every evening.   Last reviewed on 11/18/2021 11:38 AM by Gina Briscoe, DO    Review of patient's allergies indicates:   Allergen Reactions    Nsaids (non-steroidal anti-inflammatory drug) Itching    Last reviewed on  11/18/2021 11:38 AM by Gina Briscoe      Tasks added this encounter   6/12/2022 - Refill Call (Auto Added)   Tasks due within next 3 months   No tasks due.     [facility]  [address] [address]  Phone: [phone]  Fax: [phone]

## 2022-05-18 ENCOUNTER — HOSPITAL ENCOUNTER (OUTPATIENT)
Dept: PREADMISSION TESTING | Facility: HOSPITAL | Age: 62
Discharge: HOME | End: 2022-05-18
Attending: INTERNAL MEDICINE
Payer: COMMERCIAL

## 2022-05-18 DIAGNOSIS — Z12.11 COLON CANCER SCREENING: Primary | ICD-10-CM

## 2022-05-18 RX ORDER — SODIUM, POTASSIUM,MAG SULFATES 17.5-3.13G
1 SOLUTION, RECONSTITUTED, ORAL ORAL DAILY
Qty: 1 KIT | Refills: 0 | Status: SHIPPED | OUTPATIENT
Start: 2022-05-18 | End: 2022-05-20

## 2022-06-13 ENCOUNTER — SPECIALTY PHARMACY (OUTPATIENT)
Dept: PHARMACY | Facility: CLINIC | Age: 62
End: 2022-06-13
Payer: COMMERCIAL

## 2022-06-13 NOTE — TELEPHONE ENCOUNTER
Incoming call from pt returning [facility] refill call. Pt stated he has one pen on hand to inject on 6/22. Pt asked for refill call in a couple weeks after that pen has been used. Pending refill accordingly.

## 2022-06-29 ENCOUNTER — HOSPITAL ENCOUNTER (OUTPATIENT)
Facility: HOSPITAL | Age: 62
Discharge: HOME | End: 2022-06-29
Attending: INTERNAL MEDICINE | Admitting: INTERNAL MEDICINE
Payer: COMMERCIAL

## 2022-06-29 ENCOUNTER — ANESTHESIA EVENT (OUTPATIENT)
Dept: ENDOSCOPY | Facility: HOSPITAL | Age: 62
End: 2022-06-29
Payer: COMMERCIAL

## 2022-06-29 ENCOUNTER — SPECIALTY PHARMACY (OUTPATIENT)
Dept: PHARMACY | Facility: CLINIC | Age: 62
End: 2022-06-29
Payer: COMMERCIAL

## 2022-06-29 ENCOUNTER — ANESTHESIA (OUTPATIENT)
Dept: ENDOSCOPY | Facility: HOSPITAL | Age: 62
End: 2022-06-29
Payer: COMMERCIAL

## 2022-06-29 VITALS
HEART RATE: 58 BPM | RESPIRATION RATE: 15 BRPM | BODY MASS INDEX: 24.91 KG/M2 | TEMPERATURE: 98 F | SYSTOLIC BLOOD PRESSURE: 107 MMHG | WEIGHT: 155 LBS | DIASTOLIC BLOOD PRESSURE: 67 MMHG | OXYGEN SATURATION: 99 % | HEIGHT: 66 IN

## 2022-06-29 DIAGNOSIS — K63.5 POLYP OF COLON, UNSPECIFIED PART OF COLON, UNSPECIFIED TYPE: Primary | ICD-10-CM

## 2022-06-29 DIAGNOSIS — K63.5 POLYP OF COLON: ICD-10-CM

## 2022-06-29 LAB
GLUCOSE SERPL-MCNC: 231 MG/DL (ref 70–110)
POCT GLUCOSE: 231 MG/DL (ref 70–110)

## 2022-06-29 PROCEDURE — 27201012 HC FORCEPS, HOT/COLD, DISP: Performed by: INTERNAL MEDICINE

## 2022-06-29 PROCEDURE — 88305 TISSUE EXAM BY PATHOLOGIST: CPT | Performed by: PATHOLOGY

## 2022-06-29 PROCEDURE — 37000009 HC ANESTHESIA EA ADD 15 MINS: Performed by: INTERNAL MEDICINE

## 2022-06-29 PROCEDURE — 45380 COLONOSCOPY AND BIOPSY: CPT | Mod: 33,,, | Performed by: INTERNAL MEDICINE

## 2022-06-29 PROCEDURE — 88305 TISSUE EXAM BY PATHOLOGIST: ICD-10-PCS | Mod: 26,,, | Performed by: PATHOLOGY

## 2022-06-29 PROCEDURE — 63600175 PHARM REV CODE 636 W HCPCS: Performed by: NURSE ANESTHETIST, CERTIFIED REGISTERED

## 2022-06-29 PROCEDURE — 88305 TISSUE EXAM BY PATHOLOGIST: CPT | Mod: 26,,, | Performed by: PATHOLOGY

## 2022-06-29 PROCEDURE — 45380 COLONOSCOPY AND BIOPSY: CPT | Performed by: INTERNAL MEDICINE

## 2022-06-29 PROCEDURE — 25000003 PHARM REV CODE 250: Performed by: NURSE ANESTHETIST, CERTIFIED REGISTERED

## 2022-06-29 PROCEDURE — 37000008 HC ANESTHESIA 1ST 15 MINUTES: Performed by: INTERNAL MEDICINE

## 2022-06-29 PROCEDURE — 45380 PR COLONOSCOPY,BIOPSY: ICD-10-PCS | Mod: 33,,, | Performed by: INTERNAL MEDICINE

## 2022-06-29 RX ORDER — LIDOCAINE HYDROCHLORIDE 10 MG/ML
INJECTION, SOLUTION EPIDURAL; INFILTRATION; INTRACAUDAL; PERINEURAL
Status: DISCONTINUED | OUTPATIENT
Start: 2022-06-29 | End: 2022-06-29

## 2022-06-29 RX ORDER — PROPOFOL 10 MG/ML
VIAL (ML) INTRAVENOUS
Status: DISCONTINUED | OUTPATIENT
Start: 2022-06-29 | End: 2022-06-29

## 2022-06-29 RX ORDER — SODIUM CHLORIDE, SODIUM LACTATE, POTASSIUM CHLORIDE, CALCIUM CHLORIDE 600; 310; 30; 20 MG/100ML; MG/100ML; MG/100ML; MG/100ML
INJECTION, SOLUTION INTRAVENOUS CONTINUOUS
Status: DISCONTINUED | OUTPATIENT
Start: 2022-06-29 | End: 2022-06-29 | Stop reason: HOSPADM

## 2022-06-29 RX ORDER — SODIUM CHLORIDE 9 MG/ML
INJECTION, SOLUTION INTRAVENOUS CONTINUOUS
Status: DISCONTINUED | OUTPATIENT
Start: 2022-06-29 | End: 2022-06-29 | Stop reason: HOSPADM

## 2022-06-29 RX ADMIN — PROPOFOL 30 MG: 10 INJECTION, EMULSION INTRAVENOUS at 10:06

## 2022-06-29 RX ADMIN — SODIUM CHLORIDE, POTASSIUM CHLORIDE, SODIUM LACTATE AND CALCIUM CHLORIDE: 600; 310; 30; 20 INJECTION, SOLUTION INTRAVENOUS at 10:06

## 2022-06-29 RX ADMIN — LIDOCAINE HYDROCHLORIDE 50 MG: 10 INJECTION, SOLUTION EPIDURAL; INFILTRATION; INTRACAUDAL; PERINEURAL at 10:06

## 2022-06-29 RX ADMIN — PROPOFOL 80 MG: 10 INJECTION, EMULSION INTRAVENOUS at 10:06

## 2022-06-29 NOTE — TELEPHONE ENCOUNTER
[facility] Will follow up and routed [facility].

## 2022-06-29 NOTE — TRANSFER OF CARE
"Anesthesia Transfer of Care Note    Patient: Robert Sinha    Procedure(s) Performed: Procedure(s) (LRB):  COLONOSCOPY (N/A)    Patient location: PACU    Anesthesia Type: MAC    Transport from OR: Transported from OR on room air with adequate spontaneous ventilation    Post pain: adequate analgesia    Post assessment: no apparent anesthetic complications    Post vital signs: stable    Level of consciousness: awake    Nausea/Vomiting: no nausea/vomiting    Complications: none    Transfer of care protocol was followed      Last vitals:   Visit Vitals  /65 (BP Location: Left arm, Patient Position: Lying)   Pulse 76   Temp 36.6 °C (97.9 °F)   Resp 15   Ht 5' 6" (1.676 m)   Wt 70.3 kg (155 lb)   SpO2 95%   BMI 25.02 kg/m²     "

## 2022-06-29 NOTE — PROVATION PATIENT INSTRUCTIONS
Discharge Summary/Instructions after an Endoscopic Procedure  Patient Name: Robert Gonzáles  Patient MRN: [id number]  Patient YOB: 1960 Wednesday, June 29, 2022 Daysi Browne MD  Dear patient,  As a result of recent federal legislation (The Federal Cures Act), you may   receive lab or pathology results from your procedure in your [facility]   account before your physician is able to contact you. Your physician or   their representative will relay the results to you with their   recommendations at their soonest availability.  Thank you,  RESTRICTIONS:  During your procedure today, you received medications for sedation.  These   medications may affect your judgment, balance and coordination.  Therefore,   for 24 hours, you have the following restrictions:   - DO NOT drive a car, operate machinery, make legal/financial decisions,   sign important papers or drink alcohol.    ACTIVITY:  Today: no heavy lifting, straining or running due to procedural   sedation/anesthesia.  The following day: return to full activity including work.  DIET:  Eat and drink normally unless instructed otherwise.     TREATMENT FOR COMMON SIDE EFFECTS:  - Mild abdominal pain, nausea, belching, bloating or excessive gas:  rest,   eat lightly and use a heating pad.  - Sore Throat: treat with throat lozenges and/or gargle with warm salt   water.  - Because air was used during the procedure, expelling large amounts of air   from your rectum or belching is normal.  - If a bowel prep was taken, you may not have a bowel movement for 1-3 days.    This is normal.  SYMPTOMS TO WATCH FOR AND REPORT TO YOUR PHYSICIAN:  1. Abdominal pain or bloating, other than gas cramps.  2. Chest pain.  3. Back pain.  4. Signs of infection such as: chills or fever occurring within 24 hours   after the procedure.  5. Rectal bleeding, which would show as bright red, maroon, or black stools.   (A tablespoon of blood from the rectum is not serious, especially if    hemorrhoids are present.)  6. Vomiting.  7. Weakness or dizziness.  GO DIRECTLY TO THE NEAREST EMERGENCY ROOM IF YOU HAVE ANY OF THE FOLLOWING:      Difficulty breathing              Chills and/or fever over 101 F   Persistent vomiting and/or vomiting blood   Severe abdominal pain   Severe chest pain   Black, tarry stools   Bleeding- more than one tablespoon   Any other symptom or condition that you feel may need urgent attention  Your doctor recommends these additional instructions:  If any biopsies were taken, your doctors clinic will contact you in 1 to 2   weeks with any results.  - Discharge patient to home.   - Resume previous diet.   - Continue present medications.   - Await pathology results.   - Repeat colonoscopy in 5 years for surveillance.   - Return to referring physician.   - Patient has a contact number available for emergencies.  The signs and   symptoms of potential delayed complications were discussed with the   patient.  Return to normal activities tomorrow.  Written discharge   instructions were provided to the patient.  For questions, problems or results please call your physician Daysi Browne MD at Work:  [phone]  If you have any questions about the above instructions, call the GI   department at [phone] or call the endoscopy unit at [phone]   from 7am until 3 pm.  [facility], EMERGENCY ROOM PHONE NUMBER:   [phone]  IF A COMPLICATION OR EMERGENCY SITUATION ARISES AND YOU ARE UNABLE TO REACH   YOUR PHYSICIAN - GO DIRECTLY TO THE EMERGENCY ROOM.  I have read or have had read to me these discharge instructions for my   procedure and have received a written copy.  I understand these   instructions and will follow-up with my physician if I have any questions.     __________________________________       _____________________________________  Nurse Signature                                          Patient/Designated   Responsible Party  Signature  Daysi Browne MD  6/29/2022 10:36:04 AM  This report has been verified and signed electronically.  Dear patient,  As a result of recent federal legislation (The Federal Cures Act), you may   receive lab or pathology results from your procedure in your [facility]   account before your physician is able to contact you. Your physician or   their representative will relay the results to you with their   recommendations at their soonest availability.  Thank you,  PROVATION

## 2022-06-29 NOTE — LETTER
ENDOSCOPY PATIENT INSTRUCTIONS    You are scheduled for a/an Colonoscopy (Procedure), on Wednesday (Day), 6/29/2022 (Date).   Please check in for your appointment at 0930.                          Your procedure will be performed at [facility] ([facility] The hospital is located at [address], [address], [address] ([place]). Once on [address], [facility] is the second building on the left. Check in for your procedure on the 1st floor. ([phone])      ALL PATIENTS:   ? You will be at the hospital for approximately 3 - 4 hours.    ? Use of Anesthesia requires you to have a responsible person who can drive you to the hospital, stay while the procedure is being done, assume responsibility for your care at discharge, and drive you home. You will not be able to operate a vehicle or machinery or sign any legal documents until the following day.   ? Leave all valuables at home, including jewelry. (Piercings need to be removed) You will need to bring your 's license, medical insurance card, and a method of payment. You will be responsible for any co-payment at time of registration.    ? If biopsies need to be performed or a polyp needs to be removed this may result in a change in the billing of your procedure and could impact your payment responsibility depending on your insurance provider.   ? Wear clothing appropriate for easy re-dressing after sedation.   ? Please bring a complete list of all medications you are taking.     MEDICATIONS:  ? BLOOD THINNING MEDICATION (Coumadin, Plavix, Eliquis, etc.):  o If you are on a blood thinning medication, our scheduling team will obtain clearance to hold from your prescribing physician. Please do not stop these medications until it is approved by your provider. Our scheduling nurse will notify you an appropriate date and time to hold prior to your procedure.  o Coumadin, Plavix and Effient MUST be stopped 5 days prior to  exam unless discussed with the doctor performing the test.    o Eliquis, Savaysa, Arixtra, and Xarelto MUST be stopped 2 days prior to exam unless discussed with the doctor performing the test.  ? WEIGHT LOSS MEDICATIONS - Stop 2 weeks prior to your appointment  ? BLOOD PRESSURE, HEART, SEIZURE, LUNG or PSYCHIATRIC MEDICATIONS you normally take in the morning, please take them the morning of your procedure.  This includes Inhalers.   o Please take these medications one hour prior to your arrival time with a small sip of water, or 2 hours before you start drinking the second part of the prep.     DIABETIC PATIENTS:   ? Check blood sugar levels while following clear liquid diet on the day before the exam. Check again on the morning of the exam and as needed if you feel hypoglycemic (low blood sugar).     ? Do not take any diabetic medications (including insulin) the morning of the exam.    ? If your blood sugar goes below 70, you may drink 4 ounces of clear juice, soda or eat a piece of hard candy. Wait 15 minutes, then recheck your blood sugar. If it isn't going up, you may drink another 4 ounces of clear juice and contact the On-Call Nurse line at [phone].   ?       Questions regarding scheduling: Endoscopy Scheduling [phone] (M-F, 7:30am-4:30pm)   Questions regarding prep or procedure: ([facility] Pre-Op Nurse [phone] or [phone]   Questions regarding prep or procedure: ([facility] Pre-Op Nurse: [phone]   Questions about Insurance/ Financial obligations: Financial Services [phone]   [facility] [phone] (M-F, 6:30am-3:00pm)   [facility] [phone] (M-F, 6:30am-3:00pm)            Questions requiring immediate assistance: [facility] On-Call Nurse Line [phone] (After Hours)         SUPREP INSTRUCTIONS  Please use this page as a checklist for your preparation.    []ITEMS TO PURCHASE BEFORE YOUR PROCEDURE:  Please  "purchase the following items from your local pharmacy prior to your appointment.    2 Ducolax (bisacodyl) tablets- no prescription needed, over the counter.   Gas X (Simethicone) 125 mg capsules - no prescription needed, over the counter.    Meclizine 25 mg tablet (for nausea)- not prescription needed, over the counter.   Magnesium citrate- 1 bottle (10 ounces)- no prescription needed, over the counter.   Suprep - A prescription is required and has been sent to your pharmacy    [] FIVE DAYS BEFORE YOUR PROCEDURE: Begin low fiber diet- see attached instructions.    []THE DAY BEFORE YOUR PROCEDURE :(WHEN YOU WAKE)  begin clear liquids only - no solid foods may be eaten until after your procedure has been performed/completed.  Please drink 64oz. of clear liquids before starting your prep at 6pm.   You may consume the following items:    Coffee, water, or tea. (We agree it's odd, but coffee and tea without milk or creamer is considered a clear liquid)    Clear carbonated beverages (soft drinks), ginger ale, sprite, sparkling water, etc. No "Energy" beverages.    Gelatin dessert, (JELLO) plain or fruit flavored. No red or purple coloring/No solid pieces of fruit.   Apple juice, white grape juice, or white cranberry juice. No pulp, no orange juice.    Gatorade, Powerade, lemonade, or limeade. No red or purple.    Clear, fat-free, beef or chicken broths, or bouillon.    Snowballs, popsicles, slushes. No red or purple coloring, no pulp.    Avoid any liquids not listed above.     []12:00 PM (the day before procedure):   Take 2 Ducolax (bisacodyl) tablets with a glass of clear liquid   Take 1 simethicone (Gas-X) 125 mg capsule with a glass of clear liquid    []2:00 PM (the day before procedure), for prevention of nausea and vomiting:   Take ½ of a tablet (12.5 mg) of Meclizine every 6 hours as needed for nausea and/or vomiting. DO NOT TAKE MORE THAN 50 MG IN A 24 HOUR PERIOD.    []3:00 PM (the day before " procedure):   Drink 1 bottle (10 ounces) of Magnesium citrate    []6:00 PM (the day before procedure) Begin the first portion of the prep.  (Suprep may taste better if refrigerated.  You may use Crystal Light to flavor   the prep solution and water - avoid Crystal Light that contains purple or red coloring.) Please follow these instructions (do not follow the instructions  on the box)   1. Pour one 6 oz. bottle of prep solution into the mixing container.   2. Add cool water to the mixing container to reach the 16-ounce fill line.  Mix well.   3. Drink ALL the contents in the mixing container.  4. Drink 2 additional 16 oz. containers of water.  Please have steps 3 and 4 consumed within 1 hour and 30 minutes.   Clear liquids may be continued until you finish the second portion of the prep. This will help you remain hydrated.        []0600AM (THE MORNING OF YOUR PROCEDURE): Complete the second portion of your prep.  1. Pour one 6 oz. bottle of prep solution into the mixing container.   2. Add cool water to the mixing container to reach the 16-ounce fill line.  Mix well.   3. Drink ALL the contents in the mixing container.  4. Drink 2 additional 16 oz. containers of water.  Please have steps 3 and 4 consumed within 1 hour and 30 minutes.   This entire process is required for the success of the procedure.   After you complete the bowel prep and the required water, you may not have anything else by mouth except for your morning medications as listed prior, with a small sip of water.    Please follow these instructions to ensure you have a very good prep.  The goal is for stool to be CLEAR OR YELLOW liquid - NO BROWN.  Avoid having to repeat the procedure due to a poor prep!  Please call [phone] or [phone] if you have any questions about your prep instructions.        LOW FIBER DIET  FIVE (5) DAYS BEFORE YOUR PROCEDURE- Please start a Low Fiber Diet as below:  A good preparation (clean out) of the colon is  necessary prior to having a colonoscopy in order to ensure that all areas of the colon can be seen without difficulty. It is helpful to avoid high fiber foods prior to having a colonoscopy as high fiber foods (especially seeds and nuts) are more difficult to completely clear out of the colon. We recommend avoiding high fiber foods for at least 4 days prior to your colonoscopy. If you have issues with constipation you may want to start avoiding high fiber foods 7 days before the procedure.    Foods to Include In Your Diet:       Grain Products:    Enriched refined white bread, buns, bagels, English muffins   Plain cereals e.g., Cheerios, Cornflakes, Cream of Wheat, Rice Krispies, Special K   Tea biscuits, arrowroot cookies, soda crackers, martha crackers, plain Delaney toast and flour tortillas  White rice, refined pasta and noodles   Fruits:   peel fruits when possible  Fruit juices except prune juice   Soft fruits: apricots, banana (1/2), cantaloupe, canned fruit cocktail, grapes,   honeydew melon, peaches, watermelon, citrus fruits, plums, pineapple   Sauces: Apple or apricot        Vegetables:    Vegetable juices    Tomato sauces    Potatoes (no skin)    Well-cooked and tender vegetables including alfalfa sprouts, spinach, beets, carrots, celery, cucumber, eggplant, lettuce, mushrooms, green/red peppers, squash, zucchini, onions, brussel sprouts, asparagus     Meat and Protein Choice:  Well-cooked, tender meat, fish and eggs            Foods to avoid:   ? Whole grain breads and pastas, corn bread or muffins, products made with whole grain products, bran, seeds, or nuts   ? Strong cheeses, yogurt containing fruit skins or seeds   ? Raw vegetables and pickles  ? All types of whole kernel corn   ? All beans, peas, and legumes   ? Fruit peels and hard fruits   ? Tough meat, meat with gristle   ? Crunchy peanut butter   ? Nuts, seeds and popcorn   ? Millet, buckwheat, flax, oatmeal, todd seeds   ? Dried fruits, berries,  other fruits with pulp or seeds (including blueberries, raspberries, and blackberries)   ? Food containing chocolate, coconut   ? Juices with pulp     Avoiding the recommended foods is one part of helping to ensure that you have adequate preparation and that you do not need to have a repeat colonoscopy any sooner than what would be recommended by the colon cancer screening guidelines.

## 2022-06-29 NOTE — ANESTHESIA PREPROCEDURE EVALUATION
06/29/2022  Robert Sinha is a 62 y.o., male.    Patient Active Problem List   Diagnosis    Hypertension goal BP (blood pressure) < 130/80    Hyperlipidemia LDL goal <70    Refractive error    Diabetes mellitus type 2 without retinopathy    Colon polyp    Other chest pain    Smoker    Family history of heart disease in male family member before age 55    LVH (left ventricular hypertrophy)    EKG abnormalities    Abnormal stress test    Abnormal EKG    Coronary artery disease of native artery of native heart with stable angina pectoris     Past Surgical History:   Procedure Laterality Date    COLONOSCOPY N/A 12/13/2016    Procedure: COLONOSCOPY;  Surgeon: Jethro Grande MD;  Location: [facility] ENDO;  Service: Endoscopy;  Laterality: N/A;    COLONOSCOPY W/ POLYPECTOMY  12/13/2016    polyp x 1/ Dr Grande; repeat 5 yrs (adenomatous tissue)    LEFT HEART CATHETERIZATION Left 6/22/2021    Procedure: CATHETERIZATION, HEART, LEFT;  Surgeon: Oswaldo Anglin MD;  Location: [facility] CATH LAB;  Service: Cardiology;  Laterality: Left;     Lab Results   Component Value Date    WBC 6.63 06/22/2021    HGB 14.5 06/22/2021    HCT 42.4 06/22/2021    MCV 91 06/22/2021     06/22/2021         Chemistry        Component Value Date/Time     11/18/2021 0938    K 5.4 (H) 11/18/2021 0938     11/18/2021 0938    CO2 26 11/18/2021 0938    BUN 20 11/18/2021 0938    CREATININE 1.2 11/18/2021 0938     (H) 11/18/2021 0938        Component Value Date/Time    CALCIUM 10.2 11/18/2021 0938    ALKPHOS 42 (L) 11/10/2021 0848    AST 19 11/10/2021 0848    ALT 23 11/10/2021 0848    BILITOT 0.5 11/10/2021 0848    ESTGFRAFRICA >60.0 11/18/2021 0938    EGFRNONAA >60.0 11/18/2021 0938            Pre-op Assessment    I have reviewed the Patient Summary Reports.     I have reviewed the Nursing Notes. I have reviewed  the NPO Status.   I have reviewed the Medications.     Review of Systems  Anesthesia Hx:  No problems with previous Anesthesia Denies Hx of Anesthetic complications  Denies Family Hx of Anesthesia complications.   Denies Personal Hx of Anesthesia complications.   Cardiovascular:   Hypertension CAD   Angina    Endocrine:   Diabetes        Physical Exam  General: Cooperative, Alert and Oriented    Airway:  Mallampati: II / II  Mouth Opening: Normal  TM Distance: Normal  Tongue: Normal  Neck ROM: Normal ROM    Dental:  Intact        Anesthesia Plan  Type of Anesthesia, risks & benefits discussed:    Anesthesia Type: MAC  Intra-op Monitoring Plan: Standard ASA Monitors  Post Op Pain Control Plan: multimodal analgesia  Informed Consent: Informed consent signed with the Patient and all parties understand the risks and agree with anesthesia plan.  All questions answered.   ASA Score: 3  Day of Surgery Review of History & Physical: H&P Update referred to the surgeon/provider.    Ready For Surgery From Anesthesia Perspective.     .

## 2022-06-29 NOTE — H&P
PRE PROCEDURE H&P    Patient Name: Robert Sinha  MRN: [id number]  : 1960  Date of Procedure:  2022  Referring Physician: Daysi Browne MD  Primary Physician: Pito Holliday MD  Procedure Physician: Daysi Browne MD       Planned Procedure: Colonoscopy  Diagnosis: previous adenomatous polyp  Chief Complaint: Same as above    HPI: Patient is an 62 y.o. male is here for the above.     Last colonoscopy:   Family history: None   Anticoagulation: None     Past Medical History:   Past Medical History:   Diagnosis Date    Diabetes mellitus, type 2     Hyperlipidemia     Hypertension         Past Surgical History:  Past Surgical History:   Procedure Laterality Date    COLONOSCOPY N/A 2016    Procedure: COLONOSCOPY;  Surgeon: Jethro Grande MD;  Location: [facility] ENDO;  Service: Endoscopy;  Laterality: N/A;    COLONOSCOPY W/ POLYPECTOMY  2016    polyp x 1/ Dr Grande; repeat 5 yrs (adenomatous tissue)    LEFT HEART CATHETERIZATION Left 2021    Procedure: CATHETERIZATION, HEART, LEFT;  Surgeon: Oswaldo Anglin MD;  Location: [facility] CATH LAB;  Service: Cardiology;  Laterality: Left;        Home Medications:  Prior to Admission medications    Medication Sig Start Date End Date Taking? Authorizing Provider   amLODIPine (NORVASC) 2.5 MG tablet Take 1 tablet (2.5 mg total) by mouth once daily. 21 Yes Ren Falk MD   aspirin (ECOTRIN) 81 MG EC tablet Take 1 tablet (81 mg total) by mouth once daily. 21  Yes Ren Falk MD   empagliflozin (JARDIANCE) 10 mg tablet Take 1 tablet (10 mg total) by mouth once daily. 21  Yes Gina Briscoe DO   glimepiride (AMARYL) 2 MG tablet Take 1 tablet (2 mg total) by mouth 2 (two) times daily with meals. 21  Yes Gina Briscoe DO   lisinopriL 10 MG tablet Take 1 tablet (10 mg total) by mouth once daily. 21  Yes Rne Falk MD   metFORMIN (GLUCOPHAGE-XR) 500 MG ER 24hr tablet Take 2 tablets (1,000 mg total) by mouth  2 (two) times daily with meals. 4/19/22  Yes Sruthi Patino PA-C   metoprolol succinate (TOPROL-XL) 50 MG 24 hr tablet TAKE 1 TABLET(50 MG) BY MOUTH EVERY DAY 6/23/22  Yes Ren Falk MD   ranolazine (RANEXA) 500 MG Tb12 TAKE 1 TABLET(500 MG) BY MOUTH TWICE DAILY 6/21/22  Yes Oswaldo Anglin MD   blood sugar diagnostic Strp 1 strip by Misc.(Non-Drug; Combo Route) route once daily. 6/22/18   Raisa Snow MD   blood-glucose meter (BLOOD GLUCOSE MONITORING) kit Use as instructed 6/22/18 7/22/21  Raisa Snow MD   cyclobenzaprine (FLEXERIL) 10 MG tablet TK 1 T ONCE A DAY ONE HOUR BEFORE SLEEP FOR TMJ PAIN 6/26/19   Historical Provider   lancets Misc 1 each by Misc.(Non-Drug; Combo Route) route once daily. 6/22/18   Raisa Snow MD   lancing device Misc Use daily per directions 6/22/18   Raisa Snow MD   PREVIDENT 5000 BOOSTER PLUS 1.1 % Pste BRUSH FOR 2 MINUTES AS DIRECTED THEN SPIT OUT 2/12/21   Historical Provider   rosuvastatin (CRESTOR) 40 MG Tab Take 1 tablet (40 mg total) by mouth every evening. 1/16/21   Raisa Snow MD        Allergies:  Review of patient's allergies indicates:   Allergen Reactions    Nsaids (non-steroidal anti-inflammatory drug) Itching        Social History:   Social History     Socioeconomic History    Marital status:      Spouse name: 2 step children also    Number of children: 1   Occupational History    Occupation: nail supplier - [employer]   Tobacco Use    Smoking status: Former Smoker     Packs/day: 0.50     Types: Cigarettes     Start date: 4/19/2022    Smokeless tobacco: Never Used   Substance and Sexual Activity    Alcohol use: Yes     Alcohol/week: 2.5 standard drinks     Types: 3 Standard drinks or equivalent per week    Drug use: No    Sexual activity: Yes     Partners: Female       Family History:  Family History   Problem Relation Age of Onset    Diabetes Mother     Heart disease Mother     Diabetes Father     Heart disease  "Sister     Heart disease Brother     Heart disease Sister     Strabismus Neg Hx     Retinal detachment Neg Hx     Macular degeneration Neg Hx     Glaucoma Neg Hx     Blindness Neg Hx     Amblyopia Neg Hx        ROS: No acute cardiac events, no acute respiratory complaints.     Physical Exam (all patients):    /65 (BP Location: Left arm, Patient Position: Lying)   Pulse 76   Temp 97.9 °F (36.6 °C)   Resp 15   Ht 5' 6" (1.676 m)   Wt 70.3 kg (155 lb)   SpO2 95%   BMI 25.02 kg/m²   Lungs: Clear to auscultation bilaterally, respirations unlabored  Heart: Regular rate and rhythm, S1 and S2 normal, no obvious murmurs  Abdomen:         Soft, non-tender, bowel sounds normal, no masses, no organomegaly    Lab Results   Component Value Date    WBC 6.63 06/22/2021    MCV 91 06/22/2021    RDW 11.7 06/22/2021     06/22/2021    INR 0.9 06/22/2021     (H) 11/18/2021    HGBA1C 8.2 (H) 02/28/2022    BUN 20 11/18/2021     11/18/2021    K 5.4 (H) 11/18/2021     11/18/2021        SEDATION PLAN: per anesthesia      History reviewed, vital signs satisfactory, cardiopulmonary status satisfactory, sedation options, risks and plans have been discussed with the patient  All their questions were answered and the patient agrees to the sedation procedures as planned and the patient is deemed an appropriate candidate for the sedation as planned.    Procedure explained to patient, informed consent obtained and placed in chart.    Daysi Browne  6/29/2022  10:12 AM     "

## 2022-06-29 NOTE — ANESTHESIA POSTPROCEDURE EVALUATION
Anesthesia Post Evaluation    Patient: Robert Sinha    Procedure(s) Performed: Procedure(s) (LRB):  COLONOSCOPY (N/A)    Final Anesthesia Type: MAC      Patient location during evaluation: PACU  Patient participation: Yes- Able to Participate  Level of consciousness: awake and alert  Post-procedure vital signs: reviewed and stable  Pain management: adequate  Airway patency: patent    PONV status at discharge: No PONV  Anesthetic complications: no      Cardiovascular status: hemodynamically stable  Respiratory status: spontaneous ventilation  Hydration status: euvolemic  Follow-up not needed.            No case tracking events are documented in the log.      Pain/Raul Score: No data recorded

## 2022-06-29 NOTE — PLAN OF CARE
Dr brady came to bedside to discuss findings. Vital signs stable, no patient c/o nausea/vomitting, no abdominal pain, no gi bleeding. Patient to be discharged from unit.

## 2022-07-01 NOTE — TELEPHONE ENCOUNTER
Spoke with patient regarding repatha refill.  Patient states that he is only injecting once a month because his labs are showing his cholesterol looking better.  I explained that he should still take the medication as prescribed even if his labs are looking better.  He said that he discussed this with his doctor.  Will follow up with provider. Patient is scheduled to inject on 7/22 if this dose is appropriate.

## 2022-07-05 LAB
FINAL PATHOLOGIC DIAGNOSIS: NORMAL
GROSS: NORMAL
Lab: NORMAL

## 2022-07-06 ENCOUNTER — PATIENT OUTREACH (OUTPATIENT)
Dept: ADMINISTRATIVE | Facility: HOSPITAL | Age: 62
End: 2022-07-06
Payer: COMMERCIAL

## 2022-07-06 NOTE — TELEPHONE ENCOUNTER
Resent message to [facility] if Dr. Falk was aware of patient's dosing schedule for repatha. Will follow up.

## 2022-07-11 NOTE — TELEPHONE ENCOUNTER
[facility] confirm patient dosing repatha sureclick once monthly is okay.     Specialty Pharmacy - Refill Coordination    Specialty Medication Orders Linked to Encounter    Flowsheet Row Most Recent Value   Medication #1 evolocumab (REPATHA SURECLICK) 140 mg/mL PnIj (Order#[id number], Rx#[id number])          Refill Questions - Documented Responses    Flowsheet Row Most Recent Value   Patient Availability and HIPAA Verification    Does patient want to proceed with activity? Yes   HIPAA/medical authority confirmed? Yes   Relationship to patient of person spoken to? Self   Refill Screening Questions    Would patient like to speak to a pharmacist? No   When does the patient need to receive the medication? 07/13/22   Refill Delivery Questions    How will the patient receive the medication? Delivery Latha   When does the patient need to receive the medication? 07/13/22   Shipping Address Home   Address in [facility] confirmed and updated if neccessary? Yes   Expected Copay ($) 5   Is the patient able to afford the medication copay? Yes   Payment Method invoice (approval required)   Days supply of Refill 56   Supplies needed? No supplies needed   Refill activity completed? Yes   Refill activity plan Refill scheduled   Shipment/Pickup Date: 07/12/22          Current Outpatient Medications   Medication Sig    amLODIPine (NORVASC) 2.5 MG tablet TAKE 1 TABLET(2.5 MG) BY MOUTH EVERY DAY    aspirin (ECOTRIN) 81 MG EC tablet Take 1 tablet (81 mg total) by mouth once daily.    blood sugar diagnostic Strp 1 strip by Misc.(Non-Drug; Combo Route) route once daily.    blood-glucose meter (BLOOD GLUCOSE MONITORING) kit Use as instructed    cyclobenzaprine (FLEXERIL) 10 MG tablet TK 1 T ONCE A DAY ONE HOUR BEFORE SLEEP FOR TMJ PAIN    empagliflozin (JARDIANCE) 10 mg tablet Take 1 tablet (10 mg total) by mouth once daily.    evolocumab (REPATHA SURECLICK) 140 mg/mL PnIj Inject 1 mL (140 mg total) into the skin every 14 (fourteen)  days.    glimepiride (AMARYL) 2 MG tablet Take 1 tablet (2 mg total) by mouth 2 (two) times daily with meals.    lancets Misc 1 each by Misc.(Non-Drug; Combo Route) route once daily.    lancing device Misc Use daily per directions    lisinopriL 10 MG tablet Take 1 tablet (10 mg total) by mouth once daily.    metFORMIN (GLUCOPHAGE-XR) 500 MG ER 24hr tablet Take 2 tablets (1,000 mg total) by mouth 2 (two) times daily with meals.    metoprolol succinate (TOPROL-XL) 50 MG 24 hr tablet TAKE 1 TABLET(50 MG) BY MOUTH EVERY DAY    PREVIDENT 5000 BOOSTER PLUS 1.1 % Pste BRUSH FOR 2 MINUTES AS DIRECTED THEN SPIT OUT    ranolazine (RANEXA) 500 MG Tb12 TAKE 1 TABLET(500 MG) BY MOUTH TWICE DAILY    rosuvastatin (CRESTOR) 40 MG Tab Take 1 tablet (40 mg total) by mouth every evening.   Last reviewed on 6/29/2022 10:36 AM by Gee Pineda RN    Review of patient's allergies indicates:   Allergen Reactions    Nsaids (non-steroidal anti-inflammatory drug) Itching    Last reviewed on  6/29/2022 10:35 AM by Gee Pineda    Interventions added this encounter   Closed: [facility] Provider Intervention - Drug therapy adherence: evolocumab (REPATHA SURECLICK) 140 mg/mL PnIj     Tasks added this encounter   No tasks added.   Tasks due within next 3 months   6/29/2022 - Refill Call (Auto Added)     Rohini Sanz, PharmD  [facility]  [address] [place] [id number]  Phone: [phone]  Fax: [phone]

## 2022-08-29 ENCOUNTER — SPECIALTY PHARMACY (OUTPATIENT)
Dept: PHARMACY | Facility: CLINIC | Age: 62
End: 2022-08-29
Payer: COMMERCIAL

## 2022-08-29 RX ORDER — EVOLOCUMAB 140 MG/ML
140 INJECTION, SOLUTION SUBCUTANEOUS
Qty: 2 ML | Refills: 5 | Status: SHIPPED | OUTPATIENT
Start: 2022-08-29 | End: 2022-09-18

## 2022-08-29 NOTE — TELEPHONE ENCOUNTER
Followed up with patient in regards to Repatha dosing schedule. Mr. Gonzáles reports that he is injecting one dose/pen of Repatha SureClick just once a month, therefore each refill of two pens lasts two months. He reports that provider is aware of this and approved for him to take it this way. Patient reports last injection was on 8/22 and next dose is due on 9/22 per once monthly schedule. The current prescription on file does not reflect that dose.     Messaged provider to confirm once monthly dosing and requested an updated prescription.

## 2022-08-29 NOTE — TELEPHONE ENCOUNTER
Spoke to patient regarding repatha refill. Repatha is out of refills. Advised pt that I will reach out the md and we will give him a call back once we receive more refills.    pt was out of refills for his repatha reached ot to md for refills and they sent them in with directions every 14 days just talked to pt he stated he took his last injection on 8/22 and he told the md that he only wants to take it once a month.

## 2022-09-18 ENCOUNTER — TELEPHONE (OUTPATIENT)
Dept: CARDIOLOGY | Facility: CLINIC | Age: 62
End: 2022-09-18
Payer: COMMERCIAL

## 2022-09-18 RX ORDER — EVOLOCUMAB 420 MG/3.5
420 KIT SUBCUTANEOUS
Qty: 3.5 ML | Refills: 0 | Status: SHIPPED | OUTPATIENT
Start: 2022-09-18 | End: 2022-10-12 | Stop reason: SDUPTHER

## 2022-09-19 NOTE — TELEPHONE ENCOUNTER
Casper CHEN approved from 9/19/22 - 9/19/23 per . Case: [id number].     Copay $4.99. [employer] copay applied $1416.91 of $1550 in benefits remaining.    Pending initial consult.

## 2022-09-19 NOTE — TELEPHONE ENCOUNTER
Called patient to let him know Repatha Pushtronex was received by [facility] and needed a PA. [facility] will contact him once determination received.

## 2022-09-20 ENCOUNTER — SPECIALTY PHARMACY (OUTPATIENT)
Dept: PHARMACY | Facility: CLINIC | Age: 62
End: 2022-09-20
Payer: COMMERCIAL

## 2022-09-20 DIAGNOSIS — E78.5 HYPERLIPIDEMIA LDL GOAL <70: Primary | ICD-10-CM

## 2022-09-20 NOTE — TELEPHONE ENCOUNTER
Specialty Pharmacy - Refill Coordination    Specialty Medication Orders Linked to Encounter      Flowsheet Row Most Recent Value   Medication #1 evolocumab (REPATHA PUSHTRONEX) 420 mg/3.5 mL Injt (Order#[id number], Rx#[id number])            Refill Questions - Documented Responses      Flowsheet Row Most Recent Value   Patient Availability and HIPAA Verification    Does patient want to proceed with activity? Yes   HIPAA/medical authority confirmed? Yes   Relationship to patient of person spoken to? Self   Refill Screening Questions    Would patient like to speak to a pharmacist? No   When does the patient need to receive the medication? 09/22/22   Refill Delivery Questions    How will the patient receive the medication? Delivery Latha   When does the patient need to receive the medication? 09/22/22   Shipping Address Home   Address in [facility] confirmed and updated if neccessary? Yes   Expected Copay ($) 4.99   Is the patient able to afford the medication copay? Yes   Payment Method invoice (Approved by Rohini Taylor, PharmD)  [AR- will send in check]   Days supply of Refill 28   Supplies needed? No supplies needed   Refill activity completed? Yes   Refill activity plan Refill scheduled   Shipment/Pickup Date: 09/20/22            Current Outpatient Medications   Medication Sig    amLODIPine (NORVASC) 2.5 MG tablet TAKE 1 TABLET(2.5 MG) BY MOUTH EVERY DAY    aspirin (ECOTRIN) 81 MG EC tablet Take 1 tablet (81 mg total) by mouth once daily.    blood sugar diagnostic Strp 1 strip by Misc.(Non-Drug; Combo Route) route once daily.    blood-glucose meter (BLOOD GLUCOSE MONITORING) kit Use as instructed    cyclobenzaprine (FLEXERIL) 10 MG tablet TK 1 T ONCE A DAY ONE HOUR BEFORE SLEEP FOR TMJ PAIN    empagliflozin (JARDIANCE) 10 mg tablet Take 1 tablet (10 mg total) by mouth once daily.    evolocumab (REPATHA PUSHTRONEX) 420 mg/3.5 mL Injt Inject 3.5 mLs (420 mg total) into the skin every 28 days.    glimepiride  (AMARYL) 2 MG tablet Take 1 tablet (2 mg total) by mouth 2 (two) times daily with meals.    lancets Misc 1 each by Misc.(Non-Drug; Combo Route) route once daily.    lancing device Misc Use daily per directions    lisinopriL 10 MG tablet Take 1 tablet (10 mg total) by mouth once daily.    metFORMIN (GLUCOPHAGE-XR) 500 MG ER 24hr tablet Take 2 tablets (1,000 mg total) by mouth 2 (two) times daily with meals.    metoprolol succinate (TOPROL-XL) 50 MG 24 hr tablet TAKE 1 TABLET(50 MG) BY MOUTH EVERY DAY    PREVIDENT 5000 BOOSTER PLUS 1.1 % Pste BRUSH FOR 2 MINUTES AS DIRECTED THEN SPIT OUT    ranolazine (RANEXA) 500 MG Tb12 TAKE 1 TABLET(500 MG) BY MOUTH TWICE DAILY    rosuvastatin (CRESTOR) 40 MG Tab Take 1 tablet (40 mg total) by mouth every evening.   Last reviewed on 6/29/2022 10:36 AM by Gee Pineda RN    Review of patient's allergies indicates:   Allergen Reactions    Nsaids (non-steroidal anti-inflammatory drug) Itching    Last reviewed on  9/18/2022 9:30 PM by Ren Falk      Tasks added this encounter   10/13/2022 - Refill Call (Auto Added)   Tasks due within next 3 months   9/19/2022 - Clinical - Initial Assessment (Manual Add)     [facility]  [address] [place] [id number]  Phone: [phone]  Fax: [phone]

## 2022-09-20 NOTE — TELEPHONE ENCOUNTER
Specialty Pharmacy - Initial Clinical Assessment    Specialty Medication Orders Linked to Encounter      Flowsheet Row Most Recent Value   Medication #1 evolocumab (REPATHA PUSHTRONEX) 420 mg/3.5 mL Injt (Order#[id number], Rx#[id number])          Patient Diagnosis   E78.5 - Hyperlipidemia LDL goal <70    Subjective    Robert Sinha is a 62 y.o. male, who is followed by the specialty pharmacy service for management and education.    Recent Encounters       Date Type Provider Description    09/20/2022 Specialty Pharmacy Rohini Sanz, DaveD Initial Clinical Assessment    09/20/2022 Specialty Pharmacy Muna Day Refill Coordination    08/29/2022 Specialty Pharmacy Kamaljit Lott Refill Coordination; Referral Authorization    06/29/2022 Specialty Pharmacy Fanny Oropeza, Patient Care Assistant Refill Coordination    06/13/2022 Specialty Pharmacy Kimberli Cortez, Tomás Clinical Intervention          Clinical call attempts since last clinical assessment   8/29/2022  8:19 PM - Specialty Pharmacy - Clinical Intervention by Laurent Calderon PharmD     Current Outpatient Medications   Medication Sig    amLODIPine (NORVASC) 2.5 MG tablet TAKE 1 TABLET(2.5 MG) BY MOUTH EVERY DAY    aspirin (ECOTRIN) 81 MG EC tablet Take 1 tablet (81 mg total) by mouth once daily.    blood sugar diagnostic Strp 1 strip by Misc.(Non-Drug; Combo Route) route once daily.    blood-glucose meter (BLOOD GLUCOSE MONITORING) kit Use as instructed    cyclobenzaprine (FLEXERIL) 10 MG tablet TK 1 T ONCE A DAY ONE HOUR BEFORE SLEEP FOR TMJ PAIN    empagliflozin (JARDIANCE) 10 mg tablet Take 1 tablet (10 mg total) by mouth once daily.    evolocumab (REPATHA PUSHTRONEX) 420 mg/3.5 mL Injt Inject 3.5 mLs (420 mg total) into the skin every 28 days.    glimepiride (AMARYL) 2 MG tablet Take 1 tablet (2 mg total) by mouth 2 (two) times daily with meals.    lancets Misc 1 each by Misc.(Non-Drug; Combo Route) route once daily.    lancing device Misc Use daily per  directions    lisinopriL 10 MG tablet Take 1 tablet (10 mg total) by mouth once daily.    metFORMIN (GLUCOPHAGE-XR) 500 MG ER 24hr tablet Take 2 tablets (1,000 mg total) by mouth 2 (two) times daily with meals.    metoprolol succinate (TOPROL-XL) 50 MG 24 hr tablet TAKE 1 TABLET(50 MG) BY MOUTH EVERY DAY    PREVIDENT 5000 BOOSTER PLUS 1.1 % Pste BRUSH FOR 2 MINUTES AS DIRECTED THEN SPIT OUT    ranolazine (RANEXA) 500 MG Tb12 TAKE 1 TABLET(500 MG) BY MOUTH TWICE DAILY    rosuvastatin (CRESTOR) 40 MG Tab Take 1 tablet (40 mg total) by mouth every evening.   Last reviewed on 6/29/2022 10:36 AM by Gee Pineda RN    Review of patient's allergies indicates:   Allergen Reactions    Nsaids (non-steroidal anti-inflammatory drug) Itching   Last reviewed on  9/18/2022 9:30 PM by Ren Falk    Drug Interactions    Drug interactions evaluated: no  Clinically relevant drug interactions identified: no  Provided the patient with educational material regarding drug interactions: not applicable         Adverse Effects    *All other systems reviewed and are negative       Assessment Questions - Documented Responses      Flowsheet Row Most Recent Value   Assessment    Medication Reconciliation completed for patient No   During the past 4 weeks, has patient missed any activities due to condition or medication? No   During the past 4 weeks, did patient have any of the following urgent care visits? None   Goals of Therapy Status Discussed (new start)   Status of the patients ability to self-administer: Is Able   All education points have been covered with patient? No, patient declined- printed education provided   Welcome packet contents reviewed and discussed with patient? Yes   Assesment completed? Yes   Plan Therapy being initiated   Do you need to open a clinical intervention (i-vent)? No   Do you want to schedule first shipment? Yes          Refill Questions - Documented Responses      Flowsheet Row Most Recent Value   Refill  "Screening Questions    When does the patient need to receive the medication? 09/21/22   Refill Delivery Questions    How will the patient receive the medication? Delivery Latha   When does the patient need to receive the medication? 09/21/22   Shipping Address Home   Address in [facility] confirmed and updated if neccessary? Yes   Expected Copay ($) 4.99   Is the patient able to afford the medication copay? Yes   Payment Method invoice (approval required)   Days supply of Refill 28   Supplies needed? No supplies needed   Refill activity completed? Yes   Refill activity plan Refill scheduled   Shipment/Pickup Date: 09/20/22            Objective    He has a past medical history of Diabetes mellitus, type 2, Hyperlipidemia, and Hypertension.    Tried/failed medications:     BP Readings from Last 4 Encounters:   06/29/22 107/67   11/18/21 122/70   10/04/21 114/62   08/18/21 126/62     Ht Readings from Last 4 Encounters:   06/29/22 5' 6" (1.676 m)   11/18/21 5' 6" (1.676 m)   08/18/21 5' 6" (1.676 m)   07/28/21 5' 6" (1.676 m)     Wt Readings from Last 4 Encounters:   06/29/22 70.3 kg (155 lb)   11/18/21 72.6 kg (160 lb 0.9 oz)   10/04/21 73.6 kg (162 lb 4.1 oz)   08/18/21 73.1 kg (161 lb 2.5 oz)       The goals of prescribed drug therapy management include:  Supporting patient to meet the prescriber's medical treatment objectives  Improving or maintaining quality of life  Maintaining optimal therapy adherence  Minimizing and managing side effects      Goals of Therapy Status: Discussed (new start)    Assessment/Plan  Patient plans to start therapy on 09/21/22      Indication, dosage, appropriateness, effectiveness, safety and convenience of his specialty medication(s) were reviewed today.     Patient Education   Pharmacist offer to  patient was declined. Printed educational materials will be provided with medication.        Tasks added this encounter   9/20/2022 - Clinical - Initial Assessment (Manual Add) "   Tasks due within next 3 months   9/19/2022 - Clinical - Initial Assessment (Manual Add)  10/13/2022 - Refill Call (Auto Added)     Rohini Sanz, PharmD  [facility]  [address] [facility] [address]  Phone: [phone]  Fax: [phone]

## 2022-10-12 ENCOUNTER — SPECIALTY PHARMACY (OUTPATIENT)
Dept: PHARMACY | Facility: CLINIC | Age: 62
End: 2022-10-12
Payer: COMMERCIAL

## 2022-10-12 RX ORDER — EVOLOCUMAB 420 MG/3.5
420 KIT SUBCUTANEOUS
Qty: 3.5 ML | Refills: 5 | Status: SHIPPED | OUTPATIENT
Start: 2022-10-12 | End: 2023-04-12 | Stop reason: SDUPTHER

## 2022-10-12 NOTE — TELEPHONE ENCOUNTER
Outgoing call: Spoke with patient regarding refill, due to inject on 10/21. I informed hi, that a refill request was sent to his doctor and once the refill gets approved [facility] will follow up.

## 2022-10-17 NOTE — TELEPHONE ENCOUNTER
Specialty Pharmacy - Refill Coordination    Specialty Medication Orders Linked to Encounter      Flowsheet Row Most Recent Value   Medication #1 evolocumab (REPATHA PUSHTRONEX) 420 mg/3.5 mL Injt (Order#[id number], Rx#[id number])            Refill Questions - Documented Responses      Flowsheet Row Most Recent Value   Patient Availability and HIPAA Verification    Does patient want to proceed with activity? Yes   HIPAA/medical authority confirmed? Yes   Relationship to patient of person spoken to? Self   Refill Screening Questions    Would patient like to speak to a pharmacist? No   When does the patient need to receive the medication? 10/23/22   Refill Delivery Questions    How will the patient receive the medication? MEDRx   When does the patient need to receive the medication? 10/23/22   Shipping Address Home   Address in [facility] confirmed and updated if neccessary? Yes   Expected Copay ($) 4.99   Is the patient able to afford the medication copay? Yes   Payment Method invoice (approval required)   Days supply of Refill 28   Supplies needed? No supplies needed   Refill activity completed? Yes   Refill activity plan Refill scheduled   Shipment/Pickup Date: 10/18/22            Current Outpatient Medications   Medication Sig    amLODIPine (NORVASC) 2.5 MG tablet TAKE 1 TABLET(2.5 MG) BY MOUTH EVERY DAY    aspirin (ECOTRIN) 81 MG EC tablet Take 1 tablet (81 mg total) by mouth once daily.    blood sugar diagnostic Strp 1 strip by Misc.(Non-Drug; Combo Route) route once daily.    blood-glucose meter (BLOOD GLUCOSE MONITORING) kit Use as instructed    cyclobenzaprine (FLEXERIL) 10 MG tablet TK 1 T ONCE A DAY ONE HOUR BEFORE SLEEP FOR TMJ PAIN    empagliflozin (JARDIANCE) 10 mg tablet Take 1 tablet (10 mg total) by mouth once daily.    evolocumab (REPATHA PUSHTRONEX) 420 mg/3.5 mL Injt Inject 3.5 mLs (420 mg total) into the skin every 28 days.    glimepiride (AMARYL) 2 MG tablet Take 1 tablet (2 mg total) by  mouth 2 (two) times daily with meals.    lancets Misc 1 each by Misc.(Non-Drug; Combo Route) route once daily.    lancing device Misc Use daily per directions    lisinopriL 10 MG tablet Take 1 tablet (10 mg total) by mouth once daily.    metFORMIN (GLUCOPHAGE-XR) 500 MG ER 24hr tablet Take 2 tablets (1,000 mg total) by mouth 2 (two) times daily with meals.    metoprolol succinate (TOPROL-XL) 50 MG 24 hr tablet TAKE 1 TABLET(50 MG) BY MOUTH EVERY DAY    PREVIDENT 5000 BOOSTER PLUS 1.1 % Pste BRUSH FOR 2 MINUTES AS DIRECTED THEN SPIT OUT    ranolazine (RANEXA) 500 MG Tb12 TAKE 1 TABLET(500 MG) BY MOUTH TWICE DAILY    rosuvastatin (CRESTOR) 40 MG Tab Take 1 tablet (40 mg total) by mouth every evening.   Last reviewed on 6/29/2022 10:36 AM by Gee Pineda RN    Review of patient's allergies indicates:   Allergen Reactions    Nsaids (non-steroidal anti-inflammatory drug) Itching    Last reviewed on  9/18/2022 9:30 PM by Ren Falk      Tasks added this encounter   11/13/2022 - Refill Call (Auto Added)   Tasks due within next 3 months   No tasks due.     Neha Salazar, PharmD  [facility]  [address]  [facility] [address]  Phone: [phone]  Fax: [phone]

## 2022-11-15 ENCOUNTER — SPECIALTY PHARMACY (OUTPATIENT)
Dept: PHARMACY | Facility: CLINIC | Age: 62
End: 2022-11-15
Payer: COMMERCIAL

## 2022-12-13 ENCOUNTER — SPECIALTY PHARMACY (OUTPATIENT)
Dept: PHARMACY | Facility: CLINIC | Age: 62
End: 2022-12-13
Payer: COMMERCIAL

## 2022-12-13 NOTE — TELEPHONE ENCOUNTER
Specialty Pharmacy - Refill Coordination    Specialty Medication Orders Linked to Encounter      Flowsheet Row Most Recent Value   Medication #1 evolocumab (REPATHA PUSHTRONEX) 420 mg/3.5 mL Injt (Order#[id number], Rx#[id number])            Refill Questions - Documented Responses      Flowsheet Row Most Recent Value   Patient Availability and HIPAA Verification    Does patient want to proceed with activity? Yes   HIPAA/medical authority confirmed? Yes   Relationship to patient of person spoken to? Self   Refill Screening Questions    Would patient like to speak to a pharmacist? No   When does the patient need to receive the medication? 12/22/22   Refill Delivery Questions    How will the patient receive the medication? MEDRx   When does the patient need to receive the medication? 12/22/22   Shipping Address Home   Address in [facility] confirmed and updated if neccessary? Yes   Expected Copay ($) 0   Is the patient able to afford the medication copay? Yes   Payment Method zero copay   Days supply of Refill 28   Supplies needed? No supplies needed   Refill activity completed? Yes   Refill activity plan Refill scheduled   Shipment/Pickup Date: 12/20/22            Current Outpatient Medications   Medication Sig    amLODIPine (NORVASC) 2.5 MG tablet TAKE 1 TABLET(2.5 MG) BY MOUTH EVERY DAY    aspirin (ECOTRIN) 81 MG EC tablet Take 1 tablet (81 mg total) by mouth once daily.    blood sugar diagnostic Strp 1 strip by Misc.(Non-Drug; Combo Route) route once daily.    blood-glucose meter (BLOOD GLUCOSE MONITORING) kit Use as instructed    cyclobenzaprine (FLEXERIL) 10 MG tablet TK 1 T ONCE A DAY ONE HOUR BEFORE SLEEP FOR TMJ PAIN    empagliflozin (JARDIANCE) 10 mg tablet Take 1 tablet (10 mg total) by mouth once daily.    evolocumab (REPATHA PUSHTRONEX) 420 mg/3.5 mL Injt Inject 3.5 mLs (420 mg total) into the skin every 28 days.    glimepiride (AMARYL) 2 MG tablet Take 1 tablet (2 mg total) by mouth 2 (two) times  daily with meals.    lancets Misc 1 each by Misc.(Non-Drug; Combo Route) route once daily.    lancing device Misc Use daily per directions    lisinopriL 10 MG tablet Take 1 tablet (10 mg total) by mouth once daily.    metFORMIN (GLUCOPHAGE-XR) 500 MG ER 24hr tablet Take 2 tablets (1,000 mg total) by mouth 2 (two) times daily with meals.    metoprolol succinate (TOPROL-XL) 50 MG 24 hr tablet TAKE 1 TABLET(50 MG) BY MOUTH EVERY DAY    PREVIDENT 5000 BOOSTER PLUS 1.1 % Pste BRUSH FOR 2 MINUTES AS DIRECTED THEN SPIT OUT    ranolazine (RANEXA) 500 MG Tb12 TAKE 1 TABLET(500 MG) BY MOUTH TWICE DAILY    rosuvastatin (CRESTOR) 40 MG Tab Take 1 tablet (40 mg total) by mouth every evening.   Last reviewed on 6/29/2022 10:36 AM by Gee Pineda RN    Review of patient's allergies indicates:   Allergen Reactions    Nsaids (non-steroidal anti-inflammatory drug) Itching    Last reviewed on  9/18/2022 9:30 PM by Ren Falk      Tasks added this encounter   1/12/2023 - Refill Call (Auto Added)   Tasks due within next 3 months   No tasks due.     [facility]  [address] [facility] [address]  Phone: [phone]  Fax: [phone]

## 2023-01-12 ENCOUNTER — SPECIALTY PHARMACY (OUTPATIENT)
Dept: PHARMACY | Facility: CLINIC | Age: 63
End: 2023-01-12
Payer: COMMERCIAL

## 2023-01-12 NOTE — TELEPHONE ENCOUNTER
Specialty Pharmacy - Refill Coordination    Specialty Medication Orders Linked to Encounter      Flowsheet Row Most Recent Value   Medication #1 evolocumab (REPATHA PUSHTRONEX) 420 mg/3.5 mL Injt (Order#[id number], Rx#[id number])            Refill Questions - Documented Responses      Flowsheet Row Most Recent Value   Patient Availability and HIPAA Verification    Does patient want to proceed with activity? Yes   HIPAA/medical authority confirmed? Yes   Relationship to patient of person spoken to? Self   Refill Screening Questions    Would patient like to speak to a pharmacist? No   When does the patient need to receive the medication? 01/20/23   Refill Delivery Questions    How will the patient receive the medication? MEDRx   When does the patient need to receive the medication? 01/20/23   Shipping Address Home   Address in [facility] confirmed and updated if neccessary? Yes   Expected Copay ($) 5   Is the patient able to afford the medication copay? Yes   Payment Method invoice (approval required)   Days supply of Refill 28   Supplies needed? No supplies needed   Refill activity completed? Yes   Refill activity plan Refill scheduled   Shipment/Pickup Date: 01/17/23            Current Outpatient Medications   Medication Sig    amLODIPine (NORVASC) 2.5 MG tablet TAKE 1 TABLET(2.5 MG) BY MOUTH EVERY DAY    aspirin (ECOTRIN) 81 MG EC tablet Take 1 tablet (81 mg total) by mouth once daily.    blood sugar diagnostic Strp 1 strip by Misc.(Non-Drug; Combo Route) route once daily.    blood-glucose meter (BLOOD GLUCOSE MONITORING) kit Use as instructed    cyclobenzaprine (FLEXERIL) 10 MG tablet TK 1 T ONCE A DAY ONE HOUR BEFORE SLEEP FOR TMJ PAIN    empagliflozin (JARDIANCE) 10 mg tablet Take 1 tablet (10 mg total) by mouth once daily.    evolocumab (REPATHA PUSHTRONEX) 420 mg/3.5 mL Injt Inject 3.5 mLs (420 mg total) into the skin every 28 days.    glimepiride (AMARYL) 2 MG tablet Take 1 tablet (2 mg total) by mouth  2 (two) times daily with meals.    lancets Misc 1 each by Misc.(Non-Drug; Combo Route) route once daily.    lancing device Misc Use daily per directions    lisinopriL 10 MG tablet Take 1 tablet (10 mg total) by mouth once daily.    metFORMIN (GLUCOPHAGE-XR) 500 MG ER 24hr tablet Take 2 tablets (1,000 mg total) by mouth 2 (two) times daily with meals.    metoprolol succinate (TOPROL-XL) 50 MG 24 hr tablet TAKE 1 TABLET(50 MG) BY MOUTH EVERY DAY    PREVIDENT 5000 BOOSTER PLUS 1.1 % Pste BRUSH FOR 2 MINUTES AS DIRECTED THEN SPIT OUT    ranolazine (RANEXA) 500 MG Tb12 TAKE 1 TABLET(500 MG) BY MOUTH TWICE DAILY    rosuvastatin (CRESTOR) 40 MG Tab Take 1 tablet (40 mg total) by mouth every evening.   Last reviewed on 6/29/2022 10:36 AM by Gee Pineda RN    Review of patient's allergies indicates:   Allergen Reactions    Nsaids (non-steroidal anti-inflammatory drug) Itching    Last reviewed on  9/18/2022 9:30 PM by Ren Falk      Tasks added this encounter   2/10/2023 - Refill Call (Auto Added)   Tasks due within next 3 months   No tasks due.     [facility]  [address] [facility] [address]  Phone: [phone]  Fax: [phone]

## 2023-02-10 ENCOUNTER — SPECIALTY PHARMACY (OUTPATIENT)
Dept: PHARMACY | Facility: CLINIC | Age: 63
End: 2023-02-10
Payer: COMMERCIAL

## 2023-02-10 NOTE — TELEPHONE ENCOUNTER
Outgoing call to pt regarding Repatha refill. Pt stated he is due 2/22. Asked pt if we could call a week before he is due and he agreed.

## 2023-02-15 NOTE — TELEPHONE ENCOUNTER
Specialty Pharmacy - Refill Coordination    Specialty Medication Orders Linked to Encounter      Flowsheet Row Most Recent Value   Medication #1 evolocumab (REPATHA PUSHTRONEX) 420 mg/3.5 mL Injt (Order#[id number], Rx#[id number])            Refill Questions - Documented Responses      Flowsheet Row Most Recent Value   Patient Availability and HIPAA Verification    Does patient want to proceed with activity? Yes   HIPAA/medical authority confirmed? Yes   Relationship to patient of person spoken to? Self   Refill Screening Questions    Would patient like to speak to a pharmacist? No   When does the patient need to receive the medication? 02/22/23   Refill Delivery Questions    How will the patient receive the medication? MEDRx   When does the patient need to receive the medication? 02/22/23   Shipping Address Home   Address in [facility] confirmed and updated if neccessary? Yes   Expected Copay ($) 5   Is the patient able to afford the medication copay? Yes   Payment Method invoice (approval required)   Days supply of Refill 28   Supplies needed? No supplies needed   Refill activity completed? Yes   Refill activity plan Refill scheduled   Shipment/Pickup Date: 02/17/23            Current Outpatient Medications   Medication Sig    amLODIPine (NORVASC) 2.5 MG tablet TAKE 1 TABLET(2.5 MG) BY MOUTH EVERY DAY    aspirin (ECOTRIN) 81 MG EC tablet Take 1 tablet (81 mg total) by mouth once daily.    blood sugar diagnostic Strp 1 strip by Misc.(Non-Drug; Combo Route) route once daily.    blood-glucose meter (BLOOD GLUCOSE MONITORING) kit Use as instructed    cyclobenzaprine (FLEXERIL) 10 MG tablet TK 1 T ONCE A DAY ONE HOUR BEFORE SLEEP FOR TMJ PAIN    empagliflozin (JARDIANCE) 10 mg tablet Take 1 tablet (10 mg total) by mouth once daily.    evolocumab (REPATHA PUSHTRONEX) 420 mg/3.5 mL Injt Inject 3.5 mLs (420 mg total) into the skin every 28 days.    glimepiride (AMARYL) 2 MG tablet Take 1 tablet (2 mg total) by mouth  2 (two) times daily with meals.    lancets Misc 1 each by Misc.(Non-Drug; Combo Route) route once daily.    lancing device Misc Use daily per directions    lisinopriL 10 MG tablet Take 1 tablet (10 mg total) by mouth once daily.    metFORMIN (GLUCOPHAGE-XR) 500 MG ER 24hr tablet Take 2 tablets (1,000 mg total) by mouth 2 (two) times daily with meals.    metoprolol succinate (TOPROL-XL) 50 MG 24 hr tablet TAKE 1 TABLET(50 MG) BY MOUTH EVERY DAY    PREVIDENT 5000 BOOSTER PLUS 1.1 % Pste BRUSH FOR 2 MINUTES AS DIRECTED THEN SPIT OUT    ranolazine (RANEXA) 500 MG Tb12 TAKE 1 TABLET(500 MG) BY MOUTH TWICE DAILY    rosuvastatin (CRESTOR) 40 MG Tab Take 1 tablet (40 mg total) by mouth every evening.   Last reviewed on 6/29/2022 10:36 AM by Gee Pineda RN    Review of patient's allergies indicates:   Allergen Reactions    Nsaids (non-steroidal anti-inflammatory drug) Itching    Last reviewed on  9/18/2022 9:30 PM by Ren Falk      Tasks added this encounter   3/15/2023 - Refill Call (Auto Added)   Tasks due within next 3 months   No tasks due.     [facility]  [address]  [facility] [address]  Phone: [phone]  Fax: [phone]

## 2023-03-15 ENCOUNTER — SPECIALTY PHARMACY (OUTPATIENT)
Dept: PHARMACY | Facility: CLINIC | Age: 63
End: 2023-03-15
Payer: COMMERCIAL

## 2023-03-15 NOTE — TELEPHONE ENCOUNTER
Specialty Pharmacy - Refill Coordination    Specialty Medication Orders Linked to Encounter      Flowsheet Row Most Recent Value   Medication #1 evolocumab (REPATHA PUSHTRONEX) 420 mg/3.5 mL Injt (Order#[id number], Rx#[id number])            Refill Questions - Documented Responses      Flowsheet Row Most Recent Value   Patient Availability and HIPAA Verification    Does patient want to proceed with activity? Yes   HIPAA/medical authority confirmed? Yes   Relationship to patient of person spoken to? Self   Refill Screening Questions    Would patient like to speak to a pharmacist? No   When does the patient need to receive the medication? 03/22/23   Refill Delivery Questions    How will the patient receive the medication? MEDRx   When does the patient need to receive the medication? 03/22/23   Shipping Address Home   Address in [facility] confirmed and updated if neccessary? Yes   Expected Copay ($) 5   Is the patient able to afford the medication copay? Yes   Payment Method invoice (approval required)   Days supply of Refill 28   Supplies needed? No supplies needed   Refill activity completed? Yes   Refill activity plan Refill scheduled   Shipment/Pickup Date: 03/16/23            Current Outpatient Medications   Medication Sig    amLODIPine (NORVASC) 2.5 MG tablet TAKE 1 TABLET(2.5 MG) BY MOUTH EVERY DAY    aspirin (ECOTRIN) 81 MG EC tablet Take 1 tablet (81 mg total) by mouth once daily.    blood sugar diagnostic Strp 1 strip by Misc.(Non-Drug; Combo Route) route once daily.    blood-glucose meter (BLOOD GLUCOSE MONITORING) kit Use as instructed    cyclobenzaprine (FLEXERIL) 10 MG tablet TK 1 T ONCE A DAY ONE HOUR BEFORE SLEEP FOR TMJ PAIN    empagliflozin (JARDIANCE) 10 mg tablet Take 1 tablet (10 mg total) by mouth once daily.    evolocumab (REPATHA PUSHTRONEX) 420 mg/3.5 mL Injt Inject 3.5 mLs (420 mg total) into the skin every 28 days.    glimepiride (AMARYL) 2 MG tablet Take 1 tablet (2 mg total) by mouth  2 (two) times daily with meals.    lancets Misc 1 each by Misc.(Non-Drug; Combo Route) route once daily.    lancing device Misc Use daily per directions    lisinopriL 10 MG tablet Take 1 tablet (10 mg total) by mouth once daily.    metFORMIN (GLUCOPHAGE-XR) 500 MG ER 24hr tablet Take 2 tablets (1,000 mg total) by mouth 2 (two) times daily with meals.    metoprolol succinate (TOPROL-XL) 50 MG 24 hr tablet TAKE 1 TABLET(50 MG) BY MOUTH EVERY DAY    PREVIDENT 5000 BOOSTER PLUS 1.1 % Pste BRUSH FOR 2 MINUTES AS DIRECTED THEN SPIT OUT    ranolazine (RANEXA) 500 MG Tb12 TAKE 1 TABLET(500 MG) BY MOUTH TWICE DAILY    rosuvastatin (CRESTOR) 40 MG Tab Take 1 tablet (40 mg total) by mouth every evening.   Last reviewed on 6/29/2022 10:36 AM by Gee Pineda RN    Review of patient's allergies indicates:   Allergen Reactions    Nsaids (non-steroidal anti-inflammatory drug) Itching    Last reviewed on  9/18/2022 9:30 PM by Ren Falk      Tasks added this encounter   4/12/2023 - Refill Call (Auto Added)   Tasks due within next 3 months   No tasks due.     Indigo Lau, PharmD  [facility]  [address]  [facility] [address]  Phone: [phone]  Fax: [phone]

## 2023-04-12 DIAGNOSIS — I10 HYPERTENSION GOAL BP (BLOOD PRESSURE) < 130/80: Primary | ICD-10-CM

## 2023-04-12 DIAGNOSIS — R07.89 OTHER CHEST PAIN: ICD-10-CM

## 2023-04-12 DIAGNOSIS — I51.7 LVH (LEFT VENTRICULAR HYPERTROPHY): ICD-10-CM

## 2023-04-12 DIAGNOSIS — I25.118 CORONARY ARTERY DISEASE OF NATIVE ARTERY OF NATIVE HEART WITH STABLE ANGINA PECTORIS: ICD-10-CM

## 2023-04-13 ENCOUNTER — SPECIALTY PHARMACY (OUTPATIENT)
Dept: PHARMACY | Facility: CLINIC | Age: 63
End: 2023-04-13
Payer: COMMERCIAL

## 2023-04-13 ENCOUNTER — PATIENT MESSAGE (OUTPATIENT)
Dept: PHARMACY | Facility: CLINIC | Age: 63
End: 2023-04-13
Payer: COMMERCIAL

## 2023-04-13 RX ORDER — EVOLOCUMAB 420 MG/3.5
420 KIT SUBCUTANEOUS
Qty: 3.5 ML | Refills: 5 | Status: SHIPPED | OUTPATIENT
Start: 2023-04-13 | End: 2023-10-11 | Stop reason: SDUPTHER

## 2023-04-13 NOTE — TELEPHONE ENCOUNTER
Specialty Pharmacy - Refill Coordination    Specialty Medication Orders Linked to Encounter      Flowsheet Row Most Recent Value   Medication #1 evolocumab (REPATHA PUSHTRONEX) 420 mg/3.5 mL Injt (Order#[id number], Rx#[id number])            Refill Questions - Documented Responses      Flowsheet Row Most Recent Value   Patient Availability and HIPAA Verification    Does patient want to proceed with activity? Yes   HIPAA/medical authority confirmed? Yes   Relationship to patient of person spoken to? Self   Refill Screening Questions    Would patient like to speak to a pharmacist? No   When does the patient need to receive the medication? 04/22/23   Refill Delivery Questions    How will the patient receive the medication? MEDRx   When does the patient need to receive the medication? 04/22/23   Shipping Address Home   Expected Copay ($) 5   Is the patient able to afford the medication copay? Yes   Payment Method invoice (approval required)   Days supply of Refill 28   Supplies needed? No supplies needed   Refill activity completed? Yes   Refill activity plan Refill scheduled   Shipment/Pickup Date: 04/20/23            Current Outpatient Medications   Medication Sig    amLODIPine (NORVASC) 2.5 MG tablet TAKE 1 TABLET(2.5 MG) BY MOUTH EVERY DAY    aspirin (ECOTRIN) 81 MG EC tablet Take 1 tablet (81 mg total) by mouth once daily.    blood sugar diagnostic Strp 1 strip by Misc.(Non-Drug; Combo Route) route once daily.    blood-glucose meter (BLOOD GLUCOSE MONITORING) kit Use as instructed    cyclobenzaprine (FLEXERIL) 10 MG tablet TK 1 T ONCE A DAY ONE HOUR BEFORE SLEEP FOR TMJ PAIN    empagliflozin (JARDIANCE) 10 mg tablet Take 1 tablet (10 mg total) by mouth once daily.    evolocumab (REPATHA PUSHTRONEX) 420 mg/3.5 mL Injt Inject 3.5 mLs (420 mg total) into the skin every 28 days.    glimepiride (AMARYL) 2 MG tablet Take 1 tablet (2 mg total) by mouth 2 (two) times daily with meals.    lancets Misc 1 each by  Misc.(Non-Drug; Combo Route) route once daily.    lancing device Misc Use daily per directions    lisinopriL 10 MG tablet Take 1 tablet (10 mg total) by mouth once daily.    metFORMIN (GLUCOPHAGE-XR) 500 MG ER 24hr tablet Take 2 tablets (1,000 mg total) by mouth 2 (two) times daily with meals.    metoprolol succinate (TOPROL-XL) 50 MG 24 hr tablet TAKE 1 TABLET(50 MG) BY MOUTH EVERY DAY    PREVIDENT 5000 BOOSTER PLUS 1.1 % Pste BRUSH FOR 2 MINUTES AS DIRECTED THEN SPIT OUT    ranolazine (RANEXA) 500 MG Tb12 TAKE 1 TABLET(500 MG) BY MOUTH TWICE DAILY    rosuvastatin (CRESTOR) 40 MG Tab Take 1 tablet (40 mg total) by mouth every evening.   Last reviewed on 6/29/2022 10:36 AM by Gee Pineda RN    Review of patient's allergies indicates:   Allergen Reactions    Nsaids (non-steroidal anti-inflammatory drug) Itching    Last reviewed on  9/18/2022 9:30 PM by Ren Falk      Tasks added this encounter   5/11/2023 - Refill Call (Auto Added)   Tasks due within next 3 months   No tasks due.     Ruel Hensley, PharmD  [facility]  [address] [facility] [address]  Phone: [phone]  Fax: [phone]

## 2023-05-02 DIAGNOSIS — I51.7 LVH (LEFT VENTRICULAR HYPERTROPHY): Primary | ICD-10-CM

## 2023-05-04 ENCOUNTER — HOSPITAL ENCOUNTER (OUTPATIENT)
Dept: CARDIOLOGY | Facility: HOSPITAL | Age: 63
Discharge: HOME | End: 2023-05-04
Attending: INTERNAL MEDICINE
Payer: COMMERCIAL

## 2023-05-04 ENCOUNTER — OFFICE VISIT (OUTPATIENT)
Dept: CARDIOLOGY | Facility: CLINIC | Age: 63
End: 2023-05-04
Payer: COMMERCIAL

## 2023-05-04 VITALS
DIASTOLIC BLOOD PRESSURE: 68 MMHG | OXYGEN SATURATION: 99 % | BODY MASS INDEX: 24.03 KG/M2 | WEIGHT: 151.44 LBS | HEART RATE: 86 BPM | BODY MASS INDEX: 24.45 KG/M2 | WEIGHT: 149.5 LBS | SYSTOLIC BLOOD PRESSURE: 138 MMHG | HEIGHT: 66 IN

## 2023-05-04 DIAGNOSIS — I51.7 LVH (LEFT VENTRICULAR HYPERTROPHY): ICD-10-CM

## 2023-05-04 DIAGNOSIS — E78.5 HYPERLIPIDEMIA LDL GOAL <70: ICD-10-CM

## 2023-05-04 DIAGNOSIS — I45.10 RBBB: ICD-10-CM

## 2023-05-04 DIAGNOSIS — Z82.49 FAMILY HISTORY OF HEART DISEASE IN MALE FAMILY MEMBER BEFORE AGE 55: ICD-10-CM

## 2023-05-04 DIAGNOSIS — E11.9 DIABETES MELLITUS TYPE 2 WITHOUT RETINOPATHY: ICD-10-CM

## 2023-05-04 DIAGNOSIS — I25.118 CORONARY ARTERY DISEASE OF NATIVE ARTERY OF NATIVE HEART WITH STABLE ANGINA PECTORIS: Primary | ICD-10-CM

## 2023-05-04 DIAGNOSIS — I10 HYPERTENSION GOAL BP (BLOOD PRESSURE) < 130/80: ICD-10-CM

## 2023-05-04 PROCEDURE — 3008F BODY MASS INDEX DOCD: CPT | Mod: CPTII,S$GLB,, | Performed by: INTERNAL MEDICINE

## 2023-05-04 PROCEDURE — 99214 OFFICE O/P EST MOD 30 MIN: CPT | Mod: S$GLB,,, | Performed by: INTERNAL MEDICINE

## 2023-05-04 PROCEDURE — 3075F SYST BP GE 130 - 139MM HG: CPT | Mod: CPTII,S$GLB,, | Performed by: INTERNAL MEDICINE

## 2023-05-04 PROCEDURE — 1160F RVW MEDS BY RX/DR IN RCRD: CPT | Mod: CPTII,S$GLB,, | Performed by: INTERNAL MEDICINE

## 2023-05-04 PROCEDURE — 1159F PR MEDICATION LIST DOCUMENTED IN MEDICAL RECORD: ICD-10-PCS | Mod: CPTII,S$GLB,, | Performed by: INTERNAL MEDICINE

## 2023-05-04 PROCEDURE — 93010 EKG 12-LEAD: ICD-10-PCS | Mod: ,,, | Performed by: INTERNAL MEDICINE

## 2023-05-04 PROCEDURE — 99214 PR OFFICE/OUTPT VISIT, EST, LEVL IV, 30-39 MIN: ICD-10-PCS | Mod: S$GLB,,, | Performed by: INTERNAL MEDICINE

## 2023-05-04 PROCEDURE — 3008F PR BODY MASS INDEX (BMI) DOCUMENTED: ICD-10-PCS | Mod: CPTII,S$GLB,, | Performed by: INTERNAL MEDICINE

## 2023-05-04 PROCEDURE — 3078F DIAST BP <80 MM HG: CPT | Mod: CPTII,S$GLB,, | Performed by: INTERNAL MEDICINE

## 2023-05-04 PROCEDURE — 3075F PR MOST RECENT SYSTOLIC BLOOD PRESS GE 130-139MM HG: ICD-10-PCS | Mod: CPTII,S$GLB,, | Performed by: INTERNAL MEDICINE

## 2023-05-04 PROCEDURE — 1160F PR REVIEW ALL MEDS BY PRESCRIBER/CLIN PHARMACIST DOCUMENTED: ICD-10-PCS | Mod: CPTII,S$GLB,, | Performed by: INTERNAL MEDICINE

## 2023-05-04 PROCEDURE — 93005 ELECTROCARDIOGRAM TRACING: CPT

## 2023-05-04 PROCEDURE — 99999 PR PBB SHADOW E&M-EST. PATIENT-LVL V: CPT | Mod: PBBFAC,,, | Performed by: INTERNAL MEDICINE

## 2023-05-04 PROCEDURE — 3078F PR MOST RECENT DIASTOLIC BLOOD PRESSURE < 80 MM HG: ICD-10-PCS | Mod: CPTII,S$GLB,, | Performed by: INTERNAL MEDICINE

## 2023-05-04 PROCEDURE — 1159F MED LIST DOCD IN RCRD: CPT | Mod: CPTII,S$GLB,, | Performed by: INTERNAL MEDICINE

## 2023-05-04 PROCEDURE — 93010 ELECTROCARDIOGRAM REPORT: CPT | Mod: ,,, | Performed by: INTERNAL MEDICINE

## 2023-05-04 PROCEDURE — 99999 PR PBB SHADOW E&M-EST. PATIENT-LVL V: ICD-10-PCS | Mod: PBBFAC,,, | Performed by: INTERNAL MEDICINE

## 2023-05-04 NOTE — PROGRESS NOTES
Subjective:   Patient ID:  Robert Sinha is a 63 y.o. male who presents for follow up of No chief complaint on file.      60 yo male, post [facility] f/u  PMH CAD severe distal vessel Dz h/o [facility] in , DM > 20 yrs, HTN, HLD, gerd and current smoking. No h/o heart attack, stroke and cancer. F/H premature CAD.  Occasional heart burn with spicy food or when he was hungry  No chest pain, palpitation dizziness and faint  Mild DENNY  Smoking 1/2 ppd for 40 yrs. Drinking beers.  Mother DM and heart attack at age of 52  1-2019 Exercise stress echo METs 13, normal EF and no stress induced ischemia. [id number] g/m2   chest CT showed diffuse corornary calcification     visit  Occasional heart burn.  No other chest pain, SOB palpitation  Walks daily, Still smoking     visit  Phar. MPI showed inferior scar and some inferolateral ischemia  Some heart burning sensation  DM and smoker     visit  [facility] showed Occluded om4 subtotal om3 occluded left pda. distal lad 80% Small non dominant rca with luminal irregularities. lmca normal. EF joanie and LV filling pressure normal. Continue medical Rx  Left radial access normal.  Went back to work  No chest pain/heart burning. No palpitation     [id number] visit  Works at [employer]. 8 hours a day. Denied chest pain dyspnea dizziness faint. No active bleeding  Med compliance. NO smoking. Occasional drinking.     Interval history  FT work. Walking daily, Occasional LUQ abd pain, no dyspnea dizziness palpitation and leg swelling   Ekg NSr RBBB          Past Medical History:   Diagnosis Date    Diabetes mellitus, type 2     Hyperlipidemia     Hypertension        Past Surgical History:   Procedure Laterality Date    COLONOSCOPY N/A 12/13/2016    Procedure: COLONOSCOPY;  Surgeon: Jethro Grande MD;  Location: [facility];  Service: Endoscopy;  Laterality: N/A;    COLONOSCOPY N/A 6/29/2022    Procedure: COLONOSCOPY;  Surgeon: Daysi Browne MD;  Location: [facility];   Service: Endoscopy;  Laterality: N/A;    COLONOSCOPY W/ POLYPECTOMY  12/13/2016    polyp x 1/ Dr Grande; repeat 5 yrs (adenomatous tissue)    LEFT HEART CATHETERIZATION Left 6/22/2021    Procedure: CATHETERIZATION, HEART, LEFT;  Surgeon: Oswaldo Anglin MD;  Location: [facility];  Service: Cardiology;  Laterality: Left;       Social History     Tobacco Use    Smoking status: Former     Packs/day: 0.50     Types: Cigarettes     Start date: 4/19/2022    Smokeless tobacco: Never   Substance Use Topics    Alcohol use: Yes     Alcohol/week: 2.5 standard drinks     Types: 3 Standard drinks or equivalent per week    Drug use: No       Family History   Problem Relation Age of Onset    Diabetes Mother     Heart disease Mother     Diabetes Father     Heart disease Sister     Heart disease Brother     Heart disease Sister     Strabismus Neg Hx     Retinal detachment Neg Hx     Macular degeneration Neg Hx     Glaucoma Neg Hx     Blindness Neg Hx     Amblyopia Neg Hx          Review of Systems   Constitutional: Negative for decreased appetite, diaphoresis, fever, malaise/fatigue and night sweats.   HENT:  Negative for nosebleeds.    Eyes:  Negative for blurred vision and double vision.   Cardiovascular:  Negative for claudication, dyspnea on exertion, irregular heartbeat, leg swelling, near-syncope, orthopnea, palpitations, paroxysmal nocturnal dyspnea and syncope.   Respiratory:  Positive for shortness of breath. Negative for cough, sleep disturbances due to breathing, snoring, sputum production and wheezing.    Endocrine: Negative for cold intolerance and polyuria.   Hematologic/Lymphatic: Does not bruise/bleed easily.   Skin:  Negative for rash.   Musculoskeletal:  Negative for back pain, falls, joint pain, joint swelling and neck pain.   Gastrointestinal:  Negative for abdominal pain, heartburn, nausea and vomiting.   Genitourinary:  Negative for dysuria, frequency and hematuria.   Neurological:  Negative for difficulty  with concentration, dizziness, focal weakness, headaches, light-headedness, numbness, seizures and weakness.   Psychiatric/Behavioral:  Negative for depression, memory loss and substance abuse. The patient does not have insomnia.    Allergic/Immunologic: Negative for HIV exposure and hives.     Objective:   Physical Exam  HENT:      Head: Normocephalic.   Eyes:      Pupils: Pupils are equal, round, and reactive to light.   Neck:      Thyroid: No thyromegaly.      Vascular: Normal carotid pulses. No carotid bruit or JVD.   Cardiovascular:      Rate and Rhythm: Normal rate and regular rhythm. No extrasystoles are present.     Chest Wall: PMI is not displaced.      Pulses: Normal pulses.      Heart sounds: Normal heart sounds. No murmur heard.    No gallop. No S3 sounds.   Pulmonary:      Effort: No respiratory distress.      Breath sounds: Normal breath sounds. No stridor.   Abdominal:      General: Bowel sounds are normal.      Palpations: Abdomen is soft.      Tenderness: There is no abdominal tenderness. There is no rebound.   Musculoskeletal:         General: Normal range of motion.   Skin:     Findings: No rash.   Neurological:      Mental Status: He is alert and oriented to person, place, and time.   Psychiatric:         Behavior: Behavior normal.       Lab Results   Component Value Date    CHOL 93 (L) 11/10/2021    CHOL 165 08/11/2021    CHOL 165 08/11/2021     Lab Results   Component Value Date    HDL 41 11/10/2021    HDL 34 (L) 08/11/2021    HDL 34 (L) 08/11/2021     Lab Results   Component Value Date    LDLCALC 33.0 (L) 11/10/2021    LDLCALC 106.6 08/11/2021    LDLCALC 106.6 08/11/2021     Lab Results   Component Value Date    TRIG 95 11/10/2021    TRIG 122 08/11/2021    TRIG 122 08/11/2021     Lab Results   Component Value Date    CHOLHDL 44.1 11/10/2021    CHOLHDL 20.6 08/11/2021    CHOLHDL 20.6 08/11/2021       Chemistry        Component Value Date/Time     11/18/2021 0938    K 5.4 (H) 11/18/2021  0938     11/18/2021 0938    CO2 26 11/18/2021 0938    BUN 20 11/18/2021 0938    CREATININE 1.2 11/18/2021 0938     (H) 11/18/2021 0938        Component Value Date/Time    CALCIUM 10.2 11/18/2021 0938    ALKPHOS 42 (L) 11/10/2021 0848    AST 19 11/10/2021 0848    ALT 23 11/10/2021 0848    BILITOT 0.5 11/10/2021 0848    ESTGFRAFRICA >60.0 11/18/2021 0938    EGFRNONAA >60.0 11/18/2021 0938          Lab Results   Component Value Date    HGBA1C 8.2 (H) 02/28/2022     No results found for: TSH  Lab Results   Component Value Date    INR 0.9 06/22/2021     Lab Results   Component Value Date    WBC 6.63 06/22/2021    HGB 14.5 06/22/2021    HCT 42.4 06/22/2021    MCV 91 06/22/2021     06/22/2021     BMP  Sodium   Date Value Ref Range Status   11/18/2021 136 136 - 145 mmol/L Final     Potassium   Date Value Ref Range Status   11/18/2021 5.4 (H) 3.5 - 5.1 mmol/L Final     Comment:     *No Visible Hemolysis     Chloride   Date Value Ref Range Status   11/18/2021 101 95 - 110 mmol/L Final     CO2   Date Value Ref Range Status   11/18/2021 26 23 - 29 mmol/L Final     BUN   Date Value Ref Range Status   11/18/2021 20 6 - 20 mg/dL Final     Creatinine   Date Value Ref Range Status   11/18/2021 1.2 0.5 - 1.4 mg/dL Final     Calcium   Date Value Ref Range Status   11/18/2021 10.2 8.7 - 10.5 mg/dL Final     Anion Gap   Date Value Ref Range Status   11/18/2021 9 8 - 16 mmol/L Final     eGFR if    Date Value Ref Range Status   11/18/2021 >60.0 >60 mL/min/1.73 m^2 Final     eGFR if non    Date Value Ref Range Status   11/18/2021 >60.0 >60 mL/min/1.73 m^2 Final     Comment:     Calculation used to obtain the estimated glomerular filtration  rate (eGFR) is the CKD-EPI equation.        BNP  @LABRCNTIP(BNP,BNPTRIAGEBLO)@  @LABRCNTIP(troponini)@  CrCl cannot be calculated (Patient's most recent lab result is older than the maximum 7 days allowed.).  No results found in the last 24 hours.  No  results found in the last 24 hours.  No results found in the last 24 hours.    Assessment:      1. Coronary artery disease of native artery of native heart with stable angina pectoris    2. Hypertension goal BP (blood pressure) < 130/80    3. Hyperlipidemia LDL goal <70    4. Family history of heart disease in male family member before age 55    5. Diabetes mellitus type 2 without retinopathy    6. RBBB        Plan:   Continue ASA 81 mg, crestor 40 mg daily, repetha Amlodipine 2.5 mg daily Ranexa metoprolol and Lisinopril.   DM Rx per PCP  Counseled DASH  Check Lipid profile with PCP in 6 months  Recommend heart-healthy diet, weight control and regular exercise.  John. Risk modification.   I have reviewed all pertinent labs and cardiac studies independently. Plans and recommendations have been formulated under my direct supervision. All questions answered and patient voiced understanding.   If symptoms persist go to the ED  RTC in 12 months

## 2023-05-11 ENCOUNTER — PATIENT MESSAGE (OUTPATIENT)
Dept: PHARMACY | Facility: CLINIC | Age: 63
End: 2023-05-11
Payer: COMMERCIAL

## 2023-05-17 ENCOUNTER — SPECIALTY PHARMACY (OUTPATIENT)
Dept: PHARMACY | Facility: CLINIC | Age: 63
End: 2023-05-17
Payer: COMMERCIAL

## 2023-05-17 NOTE — TELEPHONE ENCOUNTER
Specialty Pharmacy - Refill Coordination    Specialty Medication Orders Linked to Encounter      Flowsheet Row Most Recent Value   Medication #1 evolocumab (REPATHA PUSHTRONEX) 420 mg/3.5 mL Injt (Order#[id number], Rx#[id number])            Refill Questions - Documented Responses      Flowsheet Row Most Recent Value   Patient Availability and HIPAA Verification    Does patient want to proceed with activity? Yes   HIPAA/medical authority confirmed? Yes   Relationship to patient of person spoken to? Self   Refill Screening Questions    Would patient like to speak to a pharmacist? No   When does the patient need to receive the medication? 05/22/23   Refill Delivery Questions    How will the patient receive the medication? MEDRx   When does the patient need to receive the medication? 05/22/23   Shipping Address Home   Address in [facility] confirmed and updated if neccessary? Yes   Expected Copay ($) 5   Payment Method invoice (approval required)  [SENDS CHECK]   Days supply of Refill 28   Supplies needed? No supplies needed   Refill activity completed? Yes   Refill activity plan Refill scheduled   Shipment/Pickup Date: 05/18/23            Current Outpatient Medications   Medication Sig    amLODIPine (NORVASC) 2.5 MG tablet TAKE 1 TABLET(2.5 MG) BY MOUTH EVERY DAY    aspirin (ECOTRIN) 81 MG EC tablet Take 1 tablet (81 mg total) by mouth once daily.    blood sugar diagnostic Strp 1 strip by Misc.(Non-Drug; Combo Route) route once daily.    blood-glucose meter (BLOOD GLUCOSE MONITORING) kit Use as instructed    cyclobenzaprine (FLEXERIL) 10 MG tablet TK 1 T ONCE A DAY ONE HOUR BEFORE SLEEP FOR TMJ PAIN    empagliflozin (JARDIANCE) 10 mg tablet Take 1 tablet (10 mg total) by mouth once daily.    evolocumab (REPATHA PUSHTRONEX) 420 mg/3.5 mL Injt Inject 3.5 mLs (420 mg total) into the skin every 28 days.    glimepiride (AMARYL) 2 MG tablet Take 1 tablet (2 mg total) by mouth 2 (two) times daily with meals.    lancets  Misc 1 each by Misc.(Non-Drug; Combo Route) route once daily.    lancing device Misc Use daily per directions    lisinopriL 10 MG tablet Take 1 tablet (10 mg total) by mouth once daily.    metFORMIN (GLUCOPHAGE-XR) 500 MG ER 24hr tablet Take 2 tablets (1,000 mg total) by mouth 2 (two) times daily with meals.    metoprolol succinate (TOPROL-XL) 50 MG 24 hr tablet TAKE 1 TABLET(50 MG) BY MOUTH EVERY DAY    PREVIDENT 5000 BOOSTER PLUS 1.1 % Pste BRUSH FOR 2 MINUTES AS DIRECTED THEN SPIT OUT    ranolazine (RANEXA) 500 MG Tb12 TAKE 1 TABLET(500 MG) BY MOUTH TWICE DAILY    rosuvastatin (CRESTOR) 40 MG Tab Take 1 tablet (40 mg total) by mouth every evening.   Last reviewed on 5/4/2023  8:43 AM by Ren Falk MD    Review of patient's allergies indicates:   Allergen Reactions    Nsaids (non-steroidal anti-inflammatory drug) Itching    Last reviewed on  5/4/2023 8:42 AM by Ren Falk      Tasks added this encounter   No tasks added.   Tasks due within next 3 months   5/15/2023 - Refill Coordination Outreach (1 time occurrence)     Kamaljit Escobar [facility]  [address] [address]  Phone: [phone]  Fax: [phone]

## 2023-06-08 ENCOUNTER — SPECIALTY PHARMACY (OUTPATIENT)
Dept: PHARMACY | Facility: CLINIC | Age: 63
End: 2023-06-08
Payer: COMMERCIAL

## 2023-06-14 NOTE — TELEPHONE ENCOUNTER
Specialty Pharmacy - Refill Coordination    Specialty Medication Orders Linked to Encounter      Flowsheet Row Most Recent Value   Medication #1 evolocumab (REPATHA PUSHTRONEX) 420 mg/3.5 mL Injt (Order#[id number], Rx#[id number])            Refill Questions - Documented Responses      Flowsheet Row Most Recent Value   Patient Availability and HIPAA Verification    Does patient want to proceed with activity? Yes   HIPAA/medical authority confirmed? Yes   Relationship to patient of person spoken to? Self   Refill Screening Questions    Would patient like to speak to a pharmacist? No   When does the patient need to receive the medication? 06/22/23   Refill Delivery Questions    How will the patient receive the medication? MEDRx   When does the patient need to receive the medication? 06/22/23   Shipping Address Home   Address in [facility] confirmed and updated if neccessary? Yes   Expected Copay ($) 5   Is the patient able to afford the medication copay? Yes   Payment Method invoice (approval required)   Days supply of Refill 28   Supplies needed? No supplies needed   Refill activity completed? Yes   Refill activity plan Refill scheduled   Shipment/Pickup Date: 06/20/23            Current Outpatient Medications   Medication Sig    amLODIPine (NORVASC) 2.5 MG tablet TAKE 1 TABLET(2.5 MG) BY MOUTH EVERY DAY    aspirin (ECOTRIN) 81 MG EC tablet Take 1 tablet (81 mg total) by mouth once daily.    blood sugar diagnostic Strp 1 strip by Misc.(Non-Drug; Combo Route) route once daily.    blood-glucose meter (BLOOD GLUCOSE MONITORING) kit Use as instructed    cyclobenzaprine (FLEXERIL) 10 MG tablet TK 1 T ONCE A DAY ONE HOUR BEFORE SLEEP FOR TMJ PAIN    empagliflozin (JARDIANCE) 10 mg tablet Take 1 tablet (10 mg total) by mouth once daily.    evolocumab (REPATHA PUSHTRONEX) 420 mg/3.5 mL Injt Inject 3.5 mLs (420 mg total) into the skin every 28 days.    glimepiride (AMARYL) 2 MG tablet Take 1 tablet (2 mg total) by mouth  2 (two) times daily with meals.    lancets Misc 1 each by Misc.(Non-Drug; Combo Route) route once daily.    lancing device Misc Use daily per directions    lisinopriL 10 MG tablet Take 1 tablet (10 mg total) by mouth once daily.    metFORMIN (GLUCOPHAGE-XR) 500 MG ER 24hr tablet Take 2 tablets (1,000 mg total) by mouth 2 (two) times daily with meals.    metoprolol succinate (TOPROL-XL) 50 MG 24 hr tablet TAKE 1 TABLET(50 MG) BY MOUTH EVERY DAY    PREVIDENT 5000 BOOSTER PLUS 1.1 % Pste BRUSH FOR 2 MINUTES AS DIRECTED THEN SPIT OUT    ranolazine (RANEXA) 500 MG Tb12 TAKE 1 TABLET(500 MG) BY MOUTH TWICE DAILY    rosuvastatin (CRESTOR) 40 MG Tab Take 1 tablet (40 mg total) by mouth every evening.   Last reviewed on 5/4/2023  8:43 AM by Ren Falk MD    Review of patient's allergies indicates:   Allergen Reactions    Nsaids (non-steroidal anti-inflammatory drug) Itching    Last reviewed on  5/4/2023 8:42 AM by Ren Falk      Tasks added this encounter   No tasks added.   Tasks due within next 3 months   No tasks due.     Luz Oropeza, Patient Care Assistant  [facility]  [address] [facility] [address]  Phone: [phone]  Fax: [phone]

## 2023-07-11 ENCOUNTER — SPECIALTY PHARMACY (OUTPATIENT)
Dept: PHARMACY | Facility: CLINIC | Age: 63
End: 2023-07-11
Payer: COMMERCIAL

## 2023-07-11 NOTE — TELEPHONE ENCOUNTER
Specialty Pharmacy - Refill Coordination    Specialty Medication Orders Linked to Encounter      Flowsheet Row Most Recent Value   Medication #1 evolocumab (REPATHA PUSHTRONEX) 420 mg/3.5 mL Injt (Order#[id number], Rx#[id number])            Refill Questions - Documented Responses      Flowsheet Row Most Recent Value   Patient Availability and HIPAA Verification    Does patient want to proceed with activity? Yes   HIPAA/medical authority confirmed? Yes   Relationship to patient of person spoken to? Self   Refill Screening Questions    Would patient like to speak to a pharmacist? No   When does the patient need to receive the medication? 07/22/23   Refill Delivery Questions    How will the patient receive the medication? MEDRx   When does the patient need to receive the medication? 07/22/23   Shipping Address Home   Address in [facility] confirmed and updated if neccessary? Yes   Expected Copay ($) 5   Is the patient able to afford the medication copay? Yes   Payment Method invoice (approval required)   Days supply of Refill 28   Supplies needed? No supplies needed   Refill activity completed? Yes   Refill activity plan Refill scheduled   Shipment/Pickup Date: 07/20/23            Current Outpatient Medications   Medication Sig    amLODIPine (NORVASC) 2.5 MG tablet TAKE 1 TABLET(2.5 MG) BY MOUTH EVERY DAY    aspirin (ECOTRIN) 81 MG EC tablet Take 1 tablet (81 mg total) by mouth once daily.    blood sugar diagnostic Strp 1 strip by Misc.(Non-Drug; Combo Route) route once daily.    blood-glucose meter (BLOOD GLUCOSE MONITORING) kit Use as instructed    cyclobenzaprine (FLEXERIL) 10 MG tablet TK 1 T ONCE A DAY ONE HOUR BEFORE SLEEP FOR TMJ PAIN    empagliflozin (JARDIANCE) 10 mg tablet Take 1 tablet (10 mg total) by mouth once daily.    evolocumab (REPATHA PUSHTRONEX) 420 mg/3.5 mL Injt Inject 3.5 mLs (420 mg total) into the skin every 28 days.    glimepiride (AMARYL) 2 MG tablet Take 1 tablet (2 mg total) by mouth  2 (two) times daily with meals.    lancets Misc 1 each by Misc.(Non-Drug; Combo Route) route once daily.    lancing device Misc Use daily per directions    lisinopriL 10 MG tablet Take 1 tablet (10 mg total) by mouth once daily.    metFORMIN (GLUCOPHAGE-XR) 500 MG ER 24hr tablet Take 2 tablets (1,000 mg total) by mouth 2 (two) times daily with meals.    metoprolol succinate (TOPROL-XL) 50 MG 24 hr tablet TAKE 1 TABLET(50 MG) BY MOUTH EVERY DAY    PREVIDENT 5000 BOOSTER PLUS 1.1 % Pste BRUSH FOR 2 MINUTES AS DIRECTED THEN SPIT OUT    ranolazine (RANEXA) 500 MG Tb12 TAKE 1 TABLET(500 MG) BY MOUTH TWICE DAILY    rosuvastatin (CRESTOR) 40 MG Tab Take 1 tablet (40 mg total) by mouth every evening.   Last reviewed on 5/4/2023  8:43 AM by Ren Falk MD    Review of patient's allergies indicates:   Allergen Reactions    Nsaids (non-steroidal anti-inflammatory drug) Itching    Last reviewed on  5/4/2023 8:42 AM by Ren Falk      Tasks added this encounter   No tasks added.   Tasks due within next 3 months   No tasks due.     Luz Oropeza, Patient Care Assistant  [facility]  [address] [facility] [address]  Phone: [phone]  Fax: [phone]

## 2023-08-10 ENCOUNTER — SPECIALTY PHARMACY (OUTPATIENT)
Dept: PHARMACY | Facility: CLINIC | Age: 63
End: 2023-08-10
Payer: COMMERCIAL

## 2023-08-10 NOTE — TELEPHONE ENCOUNTER
Outgoing call regarding repatha refill; per pt, he's due to inject on 8/22; informed him that [facility] will follow up on 8/15 to schedule delivery

## 2023-08-15 NOTE — TELEPHONE ENCOUNTER
Specialty Pharmacy - Refill Coordination    Specialty Medication Orders Linked to Encounter      Flowsheet Row Most Recent Value   Medication #1 evolocumab (REPATHA PUSHTRONEX) 420 mg/3.5 mL Injt (Order#[id number], Rx#[id number])            Refill Questions - Documented Responses      Flowsheet Row Most Recent Value   Patient Availability and HIPAA Verification    Does patient want to proceed with activity? Yes   HIPAA/medical authority confirmed? Yes   Relationship to patient of person spoken to? Self   Refill Screening Questions    Would patient like to speak to a pharmacist? No   When does the patient need to receive the medication? 08/22/23   Refill Delivery Questions    How will the patient receive the medication? MEDRx   When does the patient need to receive the medication? 08/22/23   Shipping Address Home   Address in [facility] confirmed and updated if neccessary? Yes   Expected Copay ($) 5   Is the patient able to afford the medication copay? Yes   Payment Method invoice (approval required)   Days supply of Refill 28   Supplies needed? No supplies needed   Refill activity completed? Yes   Refill activity plan Refill scheduled   Shipment/Pickup Date: 08/17/23            Current Outpatient Medications   Medication Sig    amLODIPine (NORVASC) 2.5 MG tablet TAKE 1 TABLET(2.5 MG) BY MOUTH EVERY DAY    aspirin (ECOTRIN) 81 MG EC tablet Take 1 tablet (81 mg total) by mouth once daily.    blood sugar diagnostic Strp 1 strip by Misc.(Non-Drug; Combo Route) route once daily.    blood-glucose meter (BLOOD GLUCOSE MONITORING) kit Use as instructed    cyclobenzaprine (FLEXERIL) 10 MG tablet TK 1 T ONCE A DAY ONE HOUR BEFORE SLEEP FOR TMJ PAIN    empagliflozin (JARDIANCE) 10 mg tablet Take 1 tablet (10 mg total) by mouth once daily.    evolocumab (REPATHA PUSHTRONEX) 420 mg/3.5 mL Injt Inject 3.5 mLs (420 mg total) into the skin every 28 days.    glimepiride (AMARYL) 2 MG tablet Take 1 tablet (2 mg total) by mouth  2 (two) times daily with meals.    lancets Misc 1 each by Misc.(Non-Drug; Combo Route) route once daily.    lancing device Misc Use daily per directions    lisinopriL 10 MG tablet Take 1 tablet (10 mg total) by mouth once daily.    metFORMIN (GLUCOPHAGE-XR) 500 MG ER 24hr tablet Take 2 tablets (1,000 mg total) by mouth 2 (two) times daily with meals.    metoprolol succinate (TOPROL-XL) 50 MG 24 hr tablet TAKE 1 TABLET(50 MG) BY MOUTH EVERY DAY    PREVIDENT 5000 BOOSTER PLUS 1.1 % Pste BRUSH FOR 2 MINUTES AS DIRECTED THEN SPIT OUT    ranolazine (RANEXA) 500 MG Tb12 TAKE 1 TABLET(500 MG) BY MOUTH TWICE DAILY    rosuvastatin (CRESTOR) 40 MG Tab Take 1 tablet (40 mg total) by mouth every evening.   Last reviewed on 5/4/2023  8:43 AM by Ren Falk MD    Review of patient's allergies indicates:   Allergen Reactions    Nsaids (non-steroidal anti-inflammatory drug) Itching    Last reviewed on  5/4/2023 8:42 AM by Ren Falk      Tasks added this encounter   No tasks added.   Tasks due within next 3 months   8/15/2023 - Refill Coordination Outreach (1 time occurrence)     [facility]  [address] [facility] [address]  Phone: [phone]  Fax: [phone]

## 2023-10-11 DIAGNOSIS — I51.7 LVH (LEFT VENTRICULAR HYPERTROPHY): ICD-10-CM

## 2023-10-11 DIAGNOSIS — I10 HYPERTENSION GOAL BP (BLOOD PRESSURE) < 130/80: ICD-10-CM

## 2023-10-11 RX ORDER — EVOLOCUMAB 420 MG/3.5
420 KIT SUBCUTANEOUS
Qty: 3.5 ML | Refills: 5 | Status: ACTIVE | OUTPATIENT
Start: 2023-10-11 | End: 2024-04-17

## 2024-04-10 DIAGNOSIS — I51.7 LVH (LEFT VENTRICULAR HYPERTROPHY): ICD-10-CM

## 2024-04-10 DIAGNOSIS — I10 HYPERTENSION GOAL BP (BLOOD PRESSURE) < 130/80: ICD-10-CM

## 2024-04-10 RX ORDER — EVOLOCUMAB 420 MG/3.5
420 KIT SUBCUTANEOUS
Qty: 3.5 ML | Refills: 5 | Status: ACTIVE | OUTPATIENT
Start: 2024-04-10 | End: 2024-06-06 | Stop reason: SDUPTHER

## 2024-05-06 DIAGNOSIS — I25.118 CORONARY ARTERY DISEASE OF NATIVE ARTERY OF NATIVE HEART WITH STABLE ANGINA PECTORIS: ICD-10-CM

## 2024-05-06 DIAGNOSIS — I10 HYPERTENSION GOAL BP (BLOOD PRESSURE) < 130/80: ICD-10-CM

## 2024-05-06 DIAGNOSIS — I51.7 LVH (LEFT VENTRICULAR HYPERTROPHY): Primary | ICD-10-CM

## 2024-05-07 ENCOUNTER — OFFICE VISIT (OUTPATIENT)
Dept: CARDIOLOGY | Facility: CLINIC | Age: 64
End: 2024-05-07
Payer: COMMERCIAL

## 2024-05-07 ENCOUNTER — HOSPITAL ENCOUNTER (OUTPATIENT)
Dept: CARDIOLOGY | Facility: HOSPITAL | Age: 64
Discharge: HOME | End: 2024-05-07
Attending: INTERNAL MEDICINE
Payer: COMMERCIAL

## 2024-05-07 VITALS
DIASTOLIC BLOOD PRESSURE: 64 MMHG | OXYGEN SATURATION: 98 % | HEART RATE: 70 BPM | BODY MASS INDEX: 25.44 KG/M2 | WEIGHT: 158.31 LBS | SYSTOLIC BLOOD PRESSURE: 140 MMHG | HEIGHT: 66 IN

## 2024-05-07 DIAGNOSIS — I51.7 LVH (LEFT VENTRICULAR HYPERTROPHY): ICD-10-CM

## 2024-05-07 DIAGNOSIS — E78.5 HYPERLIPIDEMIA LDL GOAL <70: ICD-10-CM

## 2024-05-07 DIAGNOSIS — I45.10 RBBB: ICD-10-CM

## 2024-05-07 DIAGNOSIS — Z82.49 FAMILY HISTORY OF HEART DISEASE IN MALE FAMILY MEMBER BEFORE AGE 55: ICD-10-CM

## 2024-05-07 DIAGNOSIS — I25.118 CORONARY ARTERY DISEASE OF NATIVE ARTERY OF NATIVE HEART WITH STABLE ANGINA PECTORIS: ICD-10-CM

## 2024-05-07 DIAGNOSIS — F17.200 SMOKER: ICD-10-CM

## 2024-05-07 DIAGNOSIS — I10 HYPERTENSION GOAL BP (BLOOD PRESSURE) < 130/80: ICD-10-CM

## 2024-05-07 DIAGNOSIS — E78.2 MIXED HYPERLIPIDEMIA: ICD-10-CM

## 2024-05-07 DIAGNOSIS — I25.118 CORONARY ARTERY DISEASE OF NATIVE ARTERY OF NATIVE HEART WITH STABLE ANGINA PECTORIS: Primary | ICD-10-CM

## 2024-05-07 DIAGNOSIS — E11.9 DIABETES MELLITUS TYPE 2 WITHOUT RETINOPATHY: ICD-10-CM

## 2024-05-07 DIAGNOSIS — R94.31 ABNORMAL EKG: ICD-10-CM

## 2024-05-07 LAB
OHS QRS DURATION: 154 MS
OHS QTC CALCULATION: 477 MS

## 2024-05-07 PROCEDURE — 99214 OFFICE O/P EST MOD 30 MIN: CPT | Mod: S$GLB,,, | Performed by: INTERNAL MEDICINE

## 2024-05-07 PROCEDURE — 1160F RVW MEDS BY RX/DR IN RCRD: CPT | Mod: CPTII,S$GLB,, | Performed by: INTERNAL MEDICINE

## 2024-05-07 PROCEDURE — 93010 ELECTROCARDIOGRAM REPORT: CPT | Mod: ,,, | Performed by: INTERNAL MEDICINE

## 2024-05-07 PROCEDURE — 3078F DIAST BP <80 MM HG: CPT | Mod: CPTII,S$GLB,, | Performed by: INTERNAL MEDICINE

## 2024-05-07 PROCEDURE — 99999 PR PBB SHADOW E&M-EST. PATIENT-LVL IV: CPT | Mod: PBBFAC,,, | Performed by: INTERNAL MEDICINE

## 2024-05-07 PROCEDURE — 4010F ACE/ARB THERAPY RXD/TAKEN: CPT | Mod: CPTII,S$GLB,, | Performed by: INTERNAL MEDICINE

## 2024-05-07 PROCEDURE — 3077F SYST BP >= 140 MM HG: CPT | Mod: CPTII,S$GLB,, | Performed by: INTERNAL MEDICINE

## 2024-05-07 PROCEDURE — 93005 ELECTROCARDIOGRAM TRACING: CPT

## 2024-05-07 PROCEDURE — 3008F BODY MASS INDEX DOCD: CPT | Mod: CPTII,S$GLB,, | Performed by: INTERNAL MEDICINE

## 2024-05-07 PROCEDURE — 1159F MED LIST DOCD IN RCRD: CPT | Mod: CPTII,S$GLB,, | Performed by: INTERNAL MEDICINE

## 2024-05-07 RX ORDER — AMLODIPINE BESYLATE 5 MG/1
5 TABLET ORAL DAILY
Qty: 90 TABLET | Refills: 3 | Status: SHIPPED | OUTPATIENT
Start: 2024-05-07

## 2024-05-07 RX ORDER — ROSUVASTATIN CALCIUM 10 MG/1
10 TABLET, COATED ORAL NIGHTLY
Qty: 90 TABLET | Refills: 3 | Status: SHIPPED | OUTPATIENT
Start: 2024-05-07

## 2024-05-07 NOTE — PROGRESS NOTES
Subjective:   Patient ID:  Robert Sinha is a 64 y.o. male who presents for follow up of No chief complaint on file.      63 yo male, 1 yr f/u  PMH CAD severe distal vessel Dz h/o LHC in , DM > 20 yrs, HTN, HLD, gerd and current smoking. No h/o heart attack, stroke and cancer. F/H premature CAD.  Occasional heart burn with spicy food or when he was hungry  No chest pain, palpitation dizziness and faint  Mild DENNY  Smoking 1/2 ppd for 40 yrs. Drinking beers.  Mother DM and heart attack at age of 52  1-2019 Exercise stress echo METs 13, normal EF and no stress induced ischemia. [id number] g/m2   chest CT showed diffuse corornary calcification     visit  Occasional heart burn.  No other chest pain, SOB palpitation  Walks daily, Still smoking     visit  Phar. MPI showed inferior scar and some inferolateral ischemia  Some heart burning sensation  DM and smoker     visit  C showed Occluded om4 subtotal om3 occluded left pda. distal lad 80% Small non dominant rca with luminal irregularities. lmca normal. EF joanie and LV filling pressure normal. Continue medical Rx  Left radial access normal.  Went back to work  No chest pain/heart burning. No palpitation     [id number] visit  Works at [employer]. 8 hours a day. Denied chest pain dyspnea dizziness faint. No active bleeding  Med compliance. NO smoking. Occasional drinking.     05/23 visit  FT work. Walking daily, Occasional LUQ abd pain, no dyspnea dizziness palpitation and leg swelling   Ekg NSr RBBB    Interval history  He denied chest pain, dyspnea on exertion, palpitation, fainting, PND, orthopnea, syncope and claudication.   Occasional dizziness if standing up quickly  FT work. Med compliance.  EKG reviewed by myself today reveals NSR. Cut back the smoking  BP high LDL 19 A1c 8.6 high  EKG reviewed by myself today reveals NSR RBBB                Past Medical History:   Diagnosis Date    Diabetes mellitus, type 2     Hyperlipidemia      Hypertension        Past Surgical History:   Procedure Laterality Date    COLONOSCOPY N/A 12/13/2016    Procedure: COLONOSCOPY;  Surgeon: Jethro Grande MD;  Location: [facility] ENDO;  Service: Endoscopy;  Laterality: N/A;    COLONOSCOPY N/A 6/29/2022    Procedure: COLONOSCOPY;  Surgeon: Daysi Browne MD;  Location: [facility] ENDO;  Service: Endoscopy;  Laterality: N/A;    COLONOSCOPY W/ POLYPECTOMY  12/13/2016    polyp x 1/ Dr Grande; repeat 5 yrs (adenomatous tissue)    LEFT HEART CATHETERIZATION Left 6/22/2021    Procedure: CATHETERIZATION, HEART, LEFT;  Surgeon: Oswaldo Anglin MD;  Location: [facility] CATH LAB;  Service: Cardiology;  Laterality: Left;       Social History     Tobacco Use    Smoking status: Former     Current packs/day: 0.50     Average packs/day: 0.5 packs/day for 2.0 years (1.0 ttl pk-yrs)     Types: Cigarettes     Start date: 4/19/2022    Smokeless tobacco: Never   Substance Use Topics    Alcohol use: Yes     Alcohol/week: 2.5 standard drinks of alcohol     Types: 3 Standard drinks or equivalent per week    Drug use: No       Family History   Problem Relation Name Age of Onset    Diabetes Mother      Heart disease Mother      Diabetes Father      Heart disease Sister      Heart disease Brother      Heart disease Sister      Strabismus Neg Hx      Retinal detachment Neg Hx      Macular degeneration Neg Hx      Glaucoma Neg Hx      Blindness Neg Hx      Amblyopia Neg Hx           ROS    Objective:   Physical Exam  HENT:      Head: Normocephalic.   Eyes:      Pupils: Pupils are equal, round, and reactive to light.   Neck:      Thyroid: No thyromegaly.      Vascular: Normal carotid pulses. No carotid bruit or JVD.   Cardiovascular:      Rate and Rhythm: Normal rate and regular rhythm. No extrasystoles are present.     Chest Wall: PMI is not displaced.      Pulses: Normal pulses.      Heart sounds: Normal heart sounds. No murmur heard.     No gallop. No S3 sounds.   Pulmonary:      Effort: No  "respiratory distress.      Breath sounds: Normal breath sounds. No stridor.   Abdominal:      General: Bowel sounds are normal.      Palpations: Abdomen is soft.      Tenderness: There is no abdominal tenderness. There is no rebound.   Skin:     Findings: No rash.   Neurological:      Mental Status: He is alert and oriented to person, place, and time.   Psychiatric:         Behavior: Behavior normal.       Lab Results   Component Value Date    CHOL 79 (L) 05/16/2023    CHOL 77 (L) 02/28/2022    CHOL 93 (L) 11/10/2021     Lab Results   Component Value Date    HDL 41 05/16/2023    HDL 41 02/28/2022    HDL 41 11/10/2021     Lab Results   Component Value Date    LDLCALC 19 05/16/2023    LDLCALC 14 02/28/2022    LDLCALC 33.0 (L) 11/10/2021     Lab Results   Component Value Date    TRIG 96 05/16/2023    TRIG 127 02/28/2022    TRIG 95 11/10/2021     Lab Results   Component Value Date    CHOLHDL 44.1 11/10/2021    CHOLHDL 20.6 08/11/2021    CHOLHDL 20.6 08/11/2021       Chemistry        Component Value Date/Time     11/18/2021 0938    K 4.5 03/28/2022 1513    K 5.4 (H) 11/18/2021 0938     11/18/2021 0938    CO2 26 11/18/2021 0938    BUN 20 11/18/2021 0938    CREATININE 1.2 11/18/2021 0938     (H) 11/18/2021 0938        Component Value Date/Time    CALCIUM 10.2 11/18/2021 0938    ALKPHOS 42 (L) 11/10/2021 0848    AST 19 11/10/2021 0848    ALT 23 11/10/2021 0848    BILITOT 0.5 11/10/2021 0848    ESTGFRAFRICA >60.0 11/18/2021 0938    EGFRNONAA >60.0 11/18/2021 0938          Lab Results   Component Value Date    HGBA1C 8.4 (H) 05/16/2023     No results found for: "TSH"  Lab Results   Component Value Date    INR 0.9 06/22/2021     Lab Results   Component Value Date    WBC 6.63 06/22/2021    HGB 14.5 06/22/2021    HCT 42.4 06/22/2021    MCV 91 06/22/2021     06/22/2021     BMP  Sodium   Date Value Ref Range Status   11/18/2021 136 136 - 145 mmol/L Final     Potassium   Date Value Ref Range Status "   03/28/2022 4.5 3.5 - 5.2 mmol/L Final   11/18/2021 5.4 (H) 3.5 - 5.1 mmol/L Final     Comment:     *No Visible Hemolysis     Chloride   Date Value Ref Range Status   11/18/2021 101 95 - 110 mmol/L Final     CO2   Date Value Ref Range Status   11/18/2021 26 23 - 29 mmol/L Final     BUN   Date Value Ref Range Status   11/18/2021 20 6 - 20 mg/dL Final     Creatinine   Date Value Ref Range Status   11/18/2021 1.2 0.5 - 1.4 mg/dL Final     Calcium   Date Value Ref Range Status   11/18/2021 10.2 8.7 - 10.5 mg/dL Final     Anion Gap   Date Value Ref Range Status   11/18/2021 9 8 - 16 mmol/L Final     eGFR if    Date Value Ref Range Status   11/18/2021 >60.0 >60 mL/min/1.73 m^2 Final     eGFR if non    Date Value Ref Range Status   11/18/2021 >60.0 >60 mL/min/1.73 m^2 Final     Comment:     Calculation used to obtain the estimated glomerular filtration  rate (eGFR) is the CKD-EPI equation.        BNP  @LABRCNTIP(BNP,BNPTRIAGEBLO)@  @LABRCNTIP(troponini)@  CrCl cannot be calculated (Patient's most recent lab result is older than the maximum 7 days allowed.).  No results found in the last 24 hours.  No results found in the last 24 hours.  No results found in the last 24 hours.    Assessment:      1. Coronary artery disease of native artery of native heart with stable angina pectoris    2. Abnormal EKG    3. Family history of heart disease in male family member before age 55    4. Hyperlipidemia LDL goal <70    5. LVH (left ventricular hypertrophy)    6. RBBB    7. Diabetes mellitus type 2 without retinopathy    8. Smoker    9. Mixed hyperlipidemia        Plan:   Increase Amlodipine to 5 mg daiyl for HTN  Continue repatha and decrease crestor to 10 mg daily  Contiune asa metoprolol lisinopril and ranexa  DM Rx per PCP  Counseled DASH  Check Lipid profile with PCP in 6 months  Recommend heart-healthy diet, weight control and regular exercise.  John. Risk modification.   I have reviewed all  pertinent labs and cardiac studies independently. Plans and recommendations have been formulated under my direct supervision. All questions answered and patient voiced understanding.   If symptoms persist go to the ED  RTC in 6 months

## 2024-05-20 RX ORDER — RANOLAZINE 500 MG/1
TABLET, EXTENDED RELEASE ORAL
Qty: 60 TABLET | Refills: 11 | Status: SHIPPED | OUTPATIENT
Start: 2024-05-20

## 2024-06-06 ENCOUNTER — TELEPHONE (OUTPATIENT)
Dept: CARDIOLOGY | Facility: CLINIC | Age: 64
End: 2024-06-06
Payer: COMMERCIAL

## 2024-06-06 DIAGNOSIS — I51.7 LVH (LEFT VENTRICULAR HYPERTROPHY): ICD-10-CM

## 2024-06-06 DIAGNOSIS — E78.2 MIXED HYPERLIPIDEMIA: Primary | ICD-10-CM

## 2024-06-06 DIAGNOSIS — I10 HYPERTENSION GOAL BP (BLOOD PRESSURE) < 130/80: ICD-10-CM

## 2024-06-06 RX ORDER — EVOLOCUMAB 420 MG/3.5
420 KIT SUBCUTANEOUS
Qty: 3.5 ML | Refills: 5 | Status: SHIPPED | OUTPATIENT
Start: 2024-06-06 | End: 2024-06-06

## 2024-06-06 RX ORDER — EVOLOCUMAB 140 MG/ML
140 INJECTION, SOLUTION SUBCUTANEOUS
Qty: 2 EACH | Refills: 11 | Status: ACTIVE | OUTPATIENT
Start: 2024-06-06

## 2024-06-06 NOTE — TELEPHONE ENCOUNTER
----- Message from Dc Tellez PharmD sent at 6/6/2024  4:23 PM CDT -----  Regarding: Repatha Pushtronex RX  Dr. Falk and Staff,    Repatha Pushtronex will be discontinued by the  on 6/30/24.  Please send a prescription for Repatha Sureclick to [facility], if appropriate. Most insurance companies are allowing Repatha Sureclick 140 mg subq every 14 days (#2 pens/28 days) to be processed without a new prior authorization. We will educate the patient on the new device.     Patient list:  Robert Gonzáles MRN: [id number]    Thanks,  Dc-OSP

## 2024-07-26 NOTE — TELEPHONE ENCOUNTER
Specialty Pharmacy - Refill Coordination    Specialty Medication Orders Linked to Encounter      Flowsheet Row Most Recent Value   Medication #1 evolocumab (REPATHA PUSHTRONEX) 420 mg/3.5 mL Injt (Order#[id number], Rx#[id number])            Refill Questions - Documented Responses      Flowsheet Row Most Recent Value   Patient Availability and HIPAA Verification    Does patient want to proceed with activity? Yes   HIPAA/medical authority confirmed? Yes   Relationship to patient of person spoken to? Self   Refill Screening Questions    Would patient like to speak to a pharmacist? No   When does the patient need to receive the medication? 11/22/22   Refill Delivery Questions    How will the patient receive the medication? MEDRx   When does the patient need to receive the medication? 11/22/22   Shipping Address Home   Address in [facility] confirmed and updated if neccessary? Yes   Expected Copay ($) 0   Is the patient able to afford the medication copay? Yes   Payment Method zero copay   Days supply of Refill 28   Supplies needed? No supplies needed   Refill activity completed? Yes   Refill activity plan Refill scheduled   Shipment/Pickup Date: 11/18/22            Current Outpatient Medications   Medication Sig    amLODIPine (NORVASC) 2.5 MG tablet TAKE 1 TABLET(2.5 MG) BY MOUTH EVERY DAY    aspirin (ECOTRIN) 81 MG EC tablet Take 1 tablet (81 mg total) by mouth once daily.    blood sugar diagnostic Strp 1 strip by Misc.(Non-Drug; Combo Route) route once daily.    blood-glucose meter (BLOOD GLUCOSE MONITORING) kit Use as instructed    cyclobenzaprine (FLEXERIL) 10 MG tablet TK 1 T ONCE A DAY ONE HOUR BEFORE SLEEP FOR TMJ PAIN    empagliflozin (JARDIANCE) 10 mg tablet Take 1 tablet (10 mg total) by mouth once daily.    evolocumab (REPATHA PUSHTRONEX) 420 mg/3.5 mL Injt Inject 3.5 mLs (420 mg total) into the skin every 28 days.    glimepiride (AMARYL) 2 MG tablet Take 1 tablet (2 mg total) by mouth 2 (two) times  Spoke with patient via phone regarding anticoagulation monitoring.   Last INR on 7/16/24 was 2.7.  Dose maintained.   Today's INR is 1.8 and is below goal range.    Current warfarin total weekly incorrect dose of 34 mg verified.  Informed the INR result is below therapeutic range and instructed to maintain current dose per protocol. Discussed dose and return date of 8/6/24 for next INR. See Anticoagulation flowsheet.    INR yesterday 1.8, below range and down from previous INR of 2.7 in nine days on incorrect dose of 34mg/last 7 days.  Pt states he missed a dose and thinks it was 7/22/24. Reported dark stools yesterday and is getting worked up by PCP for this.  Pt denies s/s clotting.  S/S of clotting reviewed and when to seek medical care.  With history of missed dose and current concerns of possible bleeding, no boost dose given today.  Instructed pt to continue TWD of 40 mg/wk and recheck INR in two weeks on 8/6/24. Pt already had 6 wk fu scheduled (appt not cancelled at this time). JULIAN Croft RN present and reviewed.    Dr. Sosa is in the office today supervising the treatment.  Referring provider Dr. Rader    Call your physician immediately if you notice any of the following symptoms of a blood clot:   Sudden weakness in any limb  Numbness or tingling anywhere  Visual changes or loss of sight in either eye  Sudden onset of slurred speech or inability to speak  Dizziness or faintness  New pain, swelling, redness or heat in any extremity  New SOB or chest pain  Symptoms associated with blood clotting/low INR reviewed and verbalizes understanding.    Instructed to contact the clinic with any unusual bleeding or bruising, any changes in medications, diet, health status, lifestyle, or any other changes, questions or concerns. Verbalized understanding of all discussed.      daily with meals.    lancets Misc 1 each by Misc.(Non-Drug; Combo Route) route once daily.    lancing device Misc Use daily per directions    lisinopriL 10 MG tablet Take 1 tablet (10 mg total) by mouth once daily.    metFORMIN (GLUCOPHAGE-XR) 500 MG ER 24hr tablet Take 2 tablets (1,000 mg total) by mouth 2 (two) times daily with meals.    metoprolol succinate (TOPROL-XL) 50 MG 24 hr tablet TAKE 1 TABLET(50 MG) BY MOUTH EVERY DAY    PREVIDENT 5000 BOOSTER PLUS 1.1 % Pste BRUSH FOR 2 MINUTES AS DIRECTED THEN SPIT OUT    ranolazine (RANEXA) 500 MG Tb12 TAKE 1 TABLET(500 MG) BY MOUTH TWICE DAILY    rosuvastatin (CRESTOR) 40 MG Tab Take 1 tablet (40 mg total) by mouth every evening.   Last reviewed on 6/29/2022 10:36 AM by Gee Pineda RN    Review of patient's allergies indicates:   Allergen Reactions    Nsaids (non-steroidal anti-inflammatory drug) Itching    Last reviewed on  9/18/2022 9:30 PM by Ren Falk      Tasks added this encounter   12/13/2022 - Refill Call (Auto Added)   Tasks due within next 3 months   No tasks due.     [facility]  [address] [facility] [address]  Phone: [phone]  Fax: [phone]

## 2024-10-10 NOTE — PROGRESS NOTES
HALIE Beyer III is a 16 y.o. previously healthy male with no past medical history presenting with loss of consciousness. He was in his usual state of health until yesterday evening. He was talking to his mom in the kitchen when he felt dizzy and began to have tunnel vision. He states that his vision went black and he woke up on the floor. Mom witnessed this episode and reported that he was leaning against the freezer and slowly slid down and went limp on the floor. She immediately left to get someone to check on him. He woke up after his mom had left and went to his bedroom to lay down. He sat down on the floor in his bedroom. Mom and her boyfriend found him in his room about a minute or two after the first episode of lost of consciousness. Soon after he lost consciousness again and mom noted that his arms were stiff and shaking with his eyes blinking quickly. Seconds later, he woke up confused. He denied biting his tongue or having urinary or bowel incontinence. He was sweating after the episode but was back to himself after a few minutes.      ED Course:  Vitals:   /57; HR 66; T 36.4 °C (97.5 °F); SpO2 99%; 70.4 kg (155 lb 4.8 oz)     Lab Results:  CBC: Hgb 11.4, MCV 78, Hct 33.6%     Imaging Results:  EKG: Rate 62, normal sinus rhythm, no ischemic ST elevations or depressions, normal axis, no QTc prolongation     XR CHEST 2V FRONTAL/LAT:  No acute radiographic abnormality per radiologist      Floor Course: 10/10 - 10/11  He arrived to the floor stable and well-appearing with little concern of acute intracranial process. Normal orthostatic vitals. Repeat EKG demonstrated mild ST elevations in anterior leads. Cardiology was consulted and recommended echo which came back normal. They would like to follow up outpatient in 1 month. Neurology consulted and recommended EEG with low concern of seizure. EEG found to be normal. The most likely cause of the loss of consciousness is vasovagal syncope, given  Subjective:       Patient ID: Robert Sinha is a 59 y.o. male.    Chief Complaint: Follow-up (diabetes)    Patient with type 2 diabetes, hypertension, hyperlipidemia here for scheduled recheck with recent labs. Lab Results       Component                Value               Date                       WBC                      8.81                07/11/2019                 HGB                      14.5                07/11/2019                 HCT                      43.2                07/11/2019                 PLT                      229                 07/11/2019                 CHOL                     182                 02/26/2019                 TRIG                     228 (H)             02/26/2019                 HDL                      31 (L)              02/26/2019                 LDLCALC                  105.4               02/26/2019                 ALT                      30                  02/26/2019                 AST                      22                  02/26/2019                 NA                       138                 07/11/2019                 K                        4.1                 07/11/2019                 CL                       105                 07/11/2019                 CALCIUM                  9.4                 07/11/2019                 CREATININE               0.9                 07/11/2019                 BUN                      16                  07/11/2019                 CO2                      22 (L)              07/11/2019                 PSA                      0.32                05/16/2014                 GLU                      117 (H)             07/11/2019                 ESTGFRAFRICA             >60.0               07/11/2019                 EGFRNONAA                >60.0               07/11/2019                 HGBA1C                   6.9 (H)             07/11/2019                 MICALBCREAT              22.9                02/26/2019             Lab Results       Component                Value               Date                       HGBA1C                   6.9 (H)             07/11/2019                 HGBA1C                   7.3 (H)             02/26/2019                 HGBA1C                   6.4 (H)             10/22/2018                 HGBA1C                   6.4 (H)             06/11/2018                 HGBA1C                   8.7 (H)             12/04/2017                 HGBA1C                   6.6 (H)             06/15/2017            Excellent change in A1c diet modification and taking glimepiride before breakfast.  Blood pressure controlled.  Lipids not controlled.  Taking rosuvastatin 40 mg. Improved in Feb from Oct levels.  Discussed treatment plan for prostate screening using shared decision making w/ patient (w/o urinary sx, no fmh prostate cancer). Pt verbalized understanding. He requests testing at this time.  Possible hypoglycemic HA.  Flexeril from his brother for left neck pain but he has not started using it =- states using posture, modifying work activity and this has helped.      Diabetes   He presents for his follow-up diabetic visit. He has type 2 diabetes mellitus. His disease course has been improving. Hypoglycemia symptoms include headaches (left frontal HA occurring after work - resolves after eating). Pertinent negatives for diabetes include no chest pain. There are no hypoglycemic complications. Symptoms are stable. Pertinent negatives for diabetic complications include no nephropathy, peripheral neuropathy or retinopathy. Risk factors for coronary artery disease include male sex, hypertension, diabetes mellitus and dyslipidemia. Current diabetic treatment includes oral agent (dual therapy) and diet. He is compliant with treatment all of the time. His weight is stable. He is following a generally healthy diet. Home blood sugar record trend: no BS checks. An ACE inhibitor/angiotensin II receptor blocker is being  his prodromal symptoms of dizziness and visual changes prior to LOC and his negative cardiac and neuro workup. Recommended staying well-hydrated and well-nourished at school and during football practices. On the day of discharge, he was stable and well-appearing with no concerns.      taken. He does not see a podiatrist.Eye exam is current.     Review of Systems   Respiratory: Positive for cough (occas). Negative for chest tightness and shortness of breath.    Cardiovascular: Negative for chest pain and leg swelling.   Neurological: Positive for headaches (left frontal HA occurring after work - resolves after eating). Negative for numbness (paresthesias to RUE no longer present).       Objective:      Physical Exam   Constitutional: He is oriented to person, place, and time. He appears well-developed and well-nourished.   HENT:   Head: Normocephalic and atraumatic.   Right Ear: External ear normal.   Left Ear: External ear normal.   Mouth/Throat: Oropharynx is clear and moist. No oropharyngeal exudate.   Neck: Normal range of motion. Neck supple.   Cardiovascular: Normal rate, regular rhythm and normal heart sounds.   Pulses:       Dorsalis pedis pulses are 2+ on the right side, and 2+ on the left side.        Posterior tibial pulses are 2+ on the right side, and 2+ on the left side.   Pulmonary/Chest: Effort normal and breath sounds normal.   Abdominal: Soft. Bowel sounds are normal. He exhibits no distension. There is no tenderness.   Musculoskeletal:        Right foot: There is normal range of motion and no deformity.        Left foot: There is normal range of motion and no deformity.   Feet:   Right Foot:   Protective Sensation: 10 sites tested. 10 sites sensed.   Skin Integrity: Negative for ulcer, blister or skin breakdown.   Left Foot:   Protective Sensation: 10 sites tested. 10 sites sensed.   Skin Integrity: Negative for ulcer, blister or skin breakdown.   Neurological: He is alert and oriented to person, place, and time.   Skin: Skin is warm and dry.   Psychiatric: He has a normal mood and affect. His behavior is normal.         Assessment/Plan:     1. Diabetes mellitus type 2 without retinopathy  Hemoglobin A1c   2. Hypertension, essential  Comprehensive metabolic panel   3.  Hyperlipidemia associated with type 2 diabetes mellitus  Lipid panel   4. Screening for prostate cancer  PSA, Screening    Continue current treatment plan for type 2 diabetes/lipids/hypertension.  Recheck 4 months with labs.  Pt asks about LDCT lungs - performed 10/2018 - will order at next visit.

## 2025-01-27 RX ORDER — RANOLAZINE 500 MG/1
TABLET, EXTENDED RELEASE ORAL
Qty: 60 TABLET | Refills: 11 | Status: SHIPPED | OUTPATIENT
Start: 2025-01-27

## 2025-02-22 RX ORDER — RANOLAZINE 500 MG/1
500 TABLET, EXTENDED RELEASE ORAL 2 TIMES DAILY
Qty: 60 TABLET | Refills: 11 | Status: SHIPPED | OUTPATIENT
Start: 2025-02-22

## 2025-05-02 RX ORDER — AMLODIPINE BESYLATE 5 MG/1
5 TABLET ORAL
Qty: 90 TABLET | Refills: 3 | Status: SHIPPED | OUTPATIENT
Start: 2025-05-02

## 2025-05-08 DIAGNOSIS — E78.2 MIXED HYPERLIPIDEMIA: Primary | ICD-10-CM

## 2025-05-08 DIAGNOSIS — I10 HYPERTENSION GOAL BP (BLOOD PRESSURE) < 130/80: ICD-10-CM

## 2025-05-08 DIAGNOSIS — I51.7 LVH (LEFT VENTRICULAR HYPERTROPHY): ICD-10-CM

## 2025-05-13 ENCOUNTER — HOSPITAL ENCOUNTER (OUTPATIENT)
Dept: CARDIOLOGY | Facility: HOSPITAL | Age: 65
Discharge: HOME | End: 2025-05-13
Attending: INTERNAL MEDICINE
Payer: COMMERCIAL

## 2025-05-13 ENCOUNTER — OFFICE VISIT (OUTPATIENT)
Dept: CARDIOLOGY | Facility: CLINIC | Age: 65
End: 2025-05-13
Payer: COMMERCIAL

## 2025-05-13 VITALS
HEIGHT: 66 IN | HEART RATE: 59 BPM | DIASTOLIC BLOOD PRESSURE: 67 MMHG | SYSTOLIC BLOOD PRESSURE: 125 MMHG | WEIGHT: 149.25 LBS | OXYGEN SATURATION: 97 % | BODY MASS INDEX: 23.99 KG/M2

## 2025-05-13 DIAGNOSIS — I10 HYPERTENSION GOAL BP (BLOOD PRESSURE) < 130/80: ICD-10-CM

## 2025-05-13 DIAGNOSIS — I25.118 CORONARY ARTERY DISEASE OF NATIVE ARTERY OF NATIVE HEART WITH STABLE ANGINA PECTORIS: Primary | ICD-10-CM

## 2025-05-13 DIAGNOSIS — I45.10 RBBB: ICD-10-CM

## 2025-05-13 DIAGNOSIS — E11.9 DIABETES MELLITUS TYPE 2 WITHOUT RETINOPATHY: ICD-10-CM

## 2025-05-13 DIAGNOSIS — I51.7 LVH (LEFT VENTRICULAR HYPERTROPHY): ICD-10-CM

## 2025-05-13 DIAGNOSIS — Z82.49 FAMILY HISTORY OF HEART DISEASE IN MALE FAMILY MEMBER BEFORE AGE 55: ICD-10-CM

## 2025-05-13 DIAGNOSIS — E78.5 HYPERLIPIDEMIA LDL GOAL <70: ICD-10-CM

## 2025-05-13 DIAGNOSIS — E78.2 MIXED HYPERLIPIDEMIA: ICD-10-CM

## 2025-05-13 DIAGNOSIS — R94.31 EKG ABNORMALITIES: ICD-10-CM

## 2025-05-13 DIAGNOSIS — F17.200 SMOKER: ICD-10-CM

## 2025-05-13 PROCEDURE — 3060F POS MICROALBUMINURIA REV: CPT | Mod: CPTII,S$GLB,, | Performed by: INTERNAL MEDICINE

## 2025-05-13 PROCEDURE — 99999 PR PBB SHADOW E&M-EST. PATIENT-LVL IV: CPT | Mod: PBBFAC,,, | Performed by: INTERNAL MEDICINE

## 2025-05-13 PROCEDURE — 3008F BODY MASS INDEX DOCD: CPT | Mod: CPTII,S$GLB,, | Performed by: INTERNAL MEDICINE

## 2025-05-13 PROCEDURE — 3074F SYST BP LT 130 MM HG: CPT | Mod: CPTII,S$GLB,, | Performed by: INTERNAL MEDICINE

## 2025-05-13 PROCEDURE — 3052F HG A1C>EQUAL 8.0%<EQUAL 9.0%: CPT | Mod: CPTII,S$GLB,, | Performed by: INTERNAL MEDICINE

## 2025-05-13 PROCEDURE — 3078F DIAST BP <80 MM HG: CPT | Mod: CPTII,S$GLB,, | Performed by: INTERNAL MEDICINE

## 2025-05-13 PROCEDURE — 3066F NEPHROPATHY DOC TX: CPT | Mod: CPTII,S$GLB,, | Performed by: INTERNAL MEDICINE

## 2025-05-13 PROCEDURE — 4010F ACE/ARB THERAPY RXD/TAKEN: CPT | Mod: CPTII,S$GLB,, | Performed by: INTERNAL MEDICINE

## 2025-05-13 PROCEDURE — 1160F RVW MEDS BY RX/DR IN RCRD: CPT | Mod: CPTII,S$GLB,, | Performed by: INTERNAL MEDICINE

## 2025-05-13 PROCEDURE — 1159F MED LIST DOCD IN RCRD: CPT | Mod: CPTII,S$GLB,, | Performed by: INTERNAL MEDICINE

## 2025-05-13 PROCEDURE — 99214 OFFICE O/P EST MOD 30 MIN: CPT | Mod: S$GLB,,, | Performed by: INTERNAL MEDICINE

## 2025-05-13 RX ORDER — AMLODIPINE BESYLATE 2.5 MG/1
2.5 TABLET ORAL DAILY
Qty: 90 TABLET | Refills: 3 | Status: SHIPPED | OUTPATIENT
Start: 2025-05-13

## 2025-05-13 NOTE — PROGRESS NOTES
Subjective:   Patient ID:  Robert Sinha is a 65 y.o. male who presents for follow up of No chief complaint on file.      64 yo male, 1 yr f/u  PMH CAD severe distal vessel Dz h/o LHC in , DM > 20 yrs, HTN, HLD, gerd and current smoking. No h/o heart attack, stroke and cancer. F/H premature CAD.  Occasional heart burn with spicy food or when he was hungry  No chest pain, palpitation dizziness and faint  Mild DENNY  Smoking 1/2 ppd for 40 yrs. Drinking beers.  Mother DM and heart attack at age of 52  1-2019 Exercise stress echo METs 13, normal EF and no stress induced ischemia. [id number] g/m2   chest CT showed diffuse corornary calcification     visit  Occasional heart burn.  No other chest pain, SOB palpitation  Walks daily, Still smoking     visit  Phar. MPI showed inferior scar and some inferolateral ischemia  Some heart burning sensation  DM and smoker     visit  C showed Occluded om4 subtotal om3 occluded left pda. distal lad 80% Small non dominant rca with luminal irregularities. lmca normal. EF joanie and LV filling pressure normal. Continue medical Rx  Left radial access normal.  Went back to work  No chest pain/heart burning. No palpitation     [id number] visit  Works at [employer]. 8 hours a day. Denied chest pain dyspnea dizziness faint. No active bleeding  Med compliance. NO smoking. Occasional drinking.     05/23 visit  FT work. Walking daily, Occasional LUQ abd pain, no dyspnea dizziness palpitation and leg swelling   Ekg NSr RBBB    04/24 visit  He denied chest pain, dyspnea on exertion, palpitation, fainting, PND, orthopnea, syncope and claudication.   Occasional dizziness if standing up quickly  FT work. Med compliance.  EKG reviewed by myself today reveals NSR. Cut back the smoking  BP high LDL 19 A1c 8.6 high  EKG reviewed by myself today reveals NSR RBBB    Interval history  Physically active and occasional dizziness. No pass out  EKG reviewed by myself today  reveals NSR RBBB            History of Present Illness    CHIEF COMPLAINT:  - Robert presents for follow-up to discuss BP control, DM management, and overall health status.    HPI:  - Reports occasional dizziness, occurring both indoors and outdoors, typically when standing up quickly  - A1c level was 8.7, a decrease of over one point from the previous measurement  - Consumes one bowl of rice daily  - Recently restarted Jardiance for DM management  - Repatha injections reduced from biweekly to monthly, as requested by him and approved by his provider  - Denies chest pain, dyspnea, syncope, or near-syncope  - Denies any hospital admissions or ER visits in the past year    CARDIAC HISTORY:  - EKG ([id number]:00:00.000Z): unremarkable  - CT of lung/chest: performed  - Nuclear stress test: performed, good results  - Heart cath: performed, good results    MEDICATIONS:  - Amlodipine 5 mg daily  - Aspirin 81 mg daily  - Lisinopril  - Metoprolol 50 mg  - Ranexa  - Rosuvastatin 10 mg  - Repatha injection monthly  - Jardiance for diabetes  - Glimepiride (Amaryl) for diabetes  - Metformin for diabetes    TEST RESULTS:  - A1C: 8.7, very high but improved by over one point from previous test  - Cholesterol: Good    MEDICAL HISTORY:  - HTN  - Diabetes  - HLD    SOCIAL HISTORY:  - Smoking: Current smoker  - 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, 5 hours a day, 5 days a week          Past Medical History:   Diagnosis Date    Diabetes mellitus, type 2     Hyperlipidemia     Hypertension        Past Surgical History:   Procedure Laterality Date    COLONOSCOPY N/A 12/13/2016    Procedure: COLONOSCOPY;  Surgeon: Jethro Grande MD;  Location: [facility] ENDO;  Service: Endoscopy;  Laterality: N/A;    COLONOSCOPY N/A 6/29/2022    Procedure: COLONOSCOPY;  Surgeon: Daysi Browne MD;  Location: [facility] ENDO;  Service: Endoscopy;  Laterality: N/A;    COLONOSCOPY W/ POLYPECTOMY  12/13/2016    polyp x 1/ Dr Grande; repeat 5 yrs  (adenomatous tissue)    LEFT HEART CATHETERIZATION Left 6/22/2021    Procedure: CATHETERIZATION, HEART, LEFT;  Surgeon: Oswaldo Anglin MD;  Location: [facility] CATH LAB;  Service: Cardiology;  Laterality: Left;       Social History[1]    Family History   Problem Relation Name Age of Onset    Diabetes Mother      Heart disease Mother      Diabetes Father      Heart disease Sister      Heart disease Brother      Heart disease Sister      Strabismus Neg Hx      Retinal detachment Neg Hx      Macular degeneration Neg Hx      Glaucoma Neg Hx      Blindness Neg Hx      Amblyopia Neg Hx           ROS    Objective:   Physical Exam  HENT:      Head: Normocephalic.   Eyes:      Pupils: Pupils are equal, round, and reactive to light.   Neck:      Thyroid: No thyromegaly.      Vascular: Normal carotid pulses. No carotid bruit or JVD.   Cardiovascular:      Rate and Rhythm: Normal rate and regular rhythm. No extrasystoles are present.     Chest Wall: PMI is not displaced.      Pulses: Normal pulses.      Heart sounds: Normal heart sounds. No murmur heard.     No gallop. No S3 sounds.   Pulmonary:      Effort: No respiratory distress.      Breath sounds: Normal breath sounds. No stridor.   Abdominal:      General: Bowel sounds are normal.      Palpations: Abdomen is soft.      Tenderness: There is no abdominal tenderness. There is no rebound.   Skin:     Findings: No rash.   Neurological:      Mental Status: He is alert and oriented to person, place, and time.   Psychiatric:         Behavior: Behavior normal.         Lab Results   Component Value Date    CHOL 90 (L) 05/01/2025    CHOL 104 02/04/2025    CHOL 79 (L) 05/16/2023     Lab Results   Component Value Date    HDL 45 05/01/2025    HDL 46 02/04/2025    HDL 41 05/16/2023     Lab Results   Component Value Date    LDLCALC 30 05/01/2025    LDLCALC 42 02/04/2025    LDLCALC 19 05/16/2023     Lab Results   Component Value Date    TRIG 67 05/01/2025    TRIG 82 02/04/2025    TRIG 96  05/16/2023     Lab Results   Component Value Date    CHOLHDL 44.1 11/10/2021    CHOLHDL 20.6 08/11/2021    CHOLHDL 20.6 08/11/2021       Chemistry        Component Value Date/Time     11/18/2021 0938    K 4.5 03/28/2022 1513    K 5.4 (H) 11/18/2021 0938     11/18/2021 0938    CO2 26 11/18/2021 0938    BUN 20 11/18/2021 0938    CREATININE 1.2 11/18/2021 0938     (H) 11/18/2021 0938        Component Value Date/Time    CALCIUM 10.2 11/18/2021 0938    ALKPHOS 42 (L) 11/10/2021 0848    AST 19 11/10/2021 0848    ALT 23 11/10/2021 0848    BILITOT 0.5 11/10/2021 0848    ESTGFRAFRICA >60.0 11/18/2021 0938    EGFRNONAA >60.0 11/18/2021 0938          Lab Results   Component Value Date    HGBA1C 8.7 (H) 05/01/2025     Lab Results   Component Value Date    TSH 1.16 05/01/2025     Lab Results   Component Value Date    INR 0.9 06/22/2021     Lab Results   Component Value Date    WBC 7 05/01/2025    HGB 14.7 05/01/2025    HCT 43.7 05/01/2025    MCV 91 06/22/2021     05/01/2025     BMP  Sodium   Date Value Ref Range Status   11/18/2021 136 136 - 145 mmol/L Final     Potassium   Date Value Ref Range Status   03/28/2022 4.5 3.5 - 5.2 mmol/L Final   11/18/2021 5.4 (H) 3.5 - 5.1 mmol/L Final     Comment:     *No Visible Hemolysis     Chloride   Date Value Ref Range Status   11/18/2021 101 95 - 110 mmol/L Final     CO2   Date Value Ref Range Status   11/18/2021 26 23 - 29 mmol/L Final     BUN   Date Value Ref Range Status   11/18/2021 20 6 - 20 mg/dL Final     Creatinine   Date Value Ref Range Status   11/18/2021 1.2 0.5 - 1.4 mg/dL Final     Calcium   Date Value Ref Range Status   11/18/2021 10.2 8.7 - 10.5 mg/dL Final     Anion Gap   Date Value Ref Range Status   11/18/2021 9 8 - 16 mmol/L Final     eGFR if    Date Value Ref Range Status   11/18/2021 >60.0 >60 mL/min/1.73 m^2 Final     eGFR if non    Date Value Ref Range Status   11/18/2021 >60.0 >60 mL/min/1.73 m^2 Final      Comment:     Calculation used to obtain the estimated glomerular filtration  rate (eGFR) is the CKD-EPI equation.        BNP  @LABRCNTIP(BNP,BNPTRIAGEBLO)@  @LABRCNTIP(troponini)@  CrCl cannot be calculated (Patient's most recent lab result is older than the maximum 7 days allowed.).  No results found in the last 24 hours.  No results found in the last 24 hours.  No results found in the last 24 hours.    Assessment:      1. Coronary artery disease of native artery of native heart with stable angina pectoris    2. RBBB    3. Hypertension goal BP (blood pressure) < 130/80    4. Hyperlipidemia LDL goal <70    5. EKG abnormalities    6. Family history of heart disease in male family member before age 55    7. Diabetes mellitus type 2 without retinopathy    8. Smoker        Plan:     Assessment & Plan    IMPRESSION:  - Blood pressure and pulse are controlled.  - Dizziness likely due to temporary orthostatic hypotension from antihypertensive medications.  - Uncontrolled diabetes with A1C of 8.7, though improved from previous visit.    HYPERTENSION:  - Decreased Amlodipine from 5 mg to 2.5 mg daily for blood pressure.  - Continued lisinopril for blood pressure.  - Continued Metoprolol 50 mg for blood pressure.  - Continued aspirin 81 mg.    TYPE 2 DIABETES MELLITUS:  - Continued Jardiance for diabetes.  - Continued Amaryl (glimepiride) for diabetes.  - Continued Metformin for diabetes.    HYPERLIPIDEMIA:  - Continued rosuvastatin 10 mg for cholesterol.  - Continued Repatha injection, reduced from every 2 weeks to monthly.    NICOTINE DEPENDENCE:  - Discussed the dangers of smoking, particularly for diabetic patients, emphasizing increased risk of heart attack.  - Recommend smoking cessation.    CHEST PAIN:  - Continued Ranexa for chest pain.    GENERAL RECOMMENDATIONS:  - Advised maintaining current exercise routine of walking for about an hour.  - Robert to continue working 5 days a week, 5 hours a day.    FOLLOW-UP:  -  Follow up in 3 months for A1C monitoring and to continue current treatment plan.         Decrease Amlodipine to 2.5 mg daily due to dizziness  Rtc in 1 yr       This note was generated with the assistance of ambient listening technology. Verbal consent was obtained by the patient and accompanying visitor(s) for the recording of patient appointment to facilitate this note. I attest to having reviewed and edited the generated note for accuracy, though some syntax or spelling errors may persist. Please contact the author of this note for any clarification.            [1]   Social History  Tobacco Use    Smoking status: Former     Current packs/day: 0.50     Average packs/day: 0.5 packs/day for 3.1 years (1.5 ttl pk-yrs)     Types: Cigarettes     Start date: 4/19/2022    Smokeless tobacco: Never   Substance Use Topics    Alcohol use: Yes     Alcohol/week: 2.5 standard drinks of alcohol     Types: 3 Standard drinks or equivalent per week    Drug use: No

## 2025-06-05 DIAGNOSIS — E78.2 MIXED HYPERLIPIDEMIA: ICD-10-CM

## 2025-06-05 RX ORDER — EVOLOCUMAB 140 MG/ML
140 INJECTION, SOLUTION SUBCUTANEOUS
Qty: 2 EACH | Refills: 11 | Status: ACTIVE | OUTPATIENT
Start: 2025-06-05

## 2025-07-25 DIAGNOSIS — E78.2 MIXED HYPERLIPIDEMIA: ICD-10-CM

## 2025-07-29 RX ORDER — ROSUVASTATIN CALCIUM 10 MG/1
10 TABLET, COATED ORAL NIGHTLY
Qty: 90 TABLET | Refills: 3 | Status: SHIPPED | OUTPATIENT
Start: 2025-07-29

## (undated) DEVICE — CATH PIG145 INFINITI 5X110CM

## (undated) DEVICE — PACK CATH LAB CUSTOM BR

## (undated) DEVICE — BAND TR COMP DEVICE REG 24CM

## (undated) DEVICE — KIT MANIFOLD LOW PRESS TUBING

## (undated) DEVICE — KIT SYR REUSABLE

## (undated) DEVICE — CATH JR4 5FR

## (undated) DEVICE — GUIDEWIRE WHOLEY HI TORQ 175CM

## (undated) DEVICE — KIT GLIDESHEATH SLEND 6FR 10CM

## (undated) DEVICE — CATH INFINITI MULTIPAK JR4 5FR

## (undated) DEVICE — WIRE GUIDE TEFLON 3CM .035 145

## (undated) DEVICE — ANGIOTOUCH KIT

## (undated) DEVICE — CATH JL4 5FR

## (undated) DEVICE — OMNIPAQUE 300MG 150ML VIAL